# Patient Record
Sex: MALE | Race: WHITE | NOT HISPANIC OR LATINO | Employment: OTHER | URBAN - METROPOLITAN AREA
[De-identification: names, ages, dates, MRNs, and addresses within clinical notes are randomized per-mention and may not be internally consistent; named-entity substitution may affect disease eponyms.]

---

## 2017-01-27 ENCOUNTER — HOSPITAL ENCOUNTER (OUTPATIENT)
Dept: CT IMAGING | Facility: HOSPITAL | Age: 72
Discharge: HOME | End: 2017-01-27
Attending: FAMILY MEDICINE | Admitting: FAMILY MEDICINE

## 2017-01-27 LAB — CREAT BLDA-MCNC: 1.1 MG/DL (ref 0.6–1.3)

## 2017-02-06 ENCOUNTER — HOSPITAL ENCOUNTER (OUTPATIENT)
Dept: CT IMAGING | Facility: HOSPITAL | Age: 72
Discharge: HOME | End: 2017-02-06
Attending: FAMILY MEDICINE | Admitting: FAMILY MEDICINE

## 2017-12-07 ENCOUNTER — HOSPITAL ENCOUNTER (OUTPATIENT)
Dept: ONCOLOGY | Facility: CLINIC | Age: 72
Setting detail: INFUSION SERIES
Discharge: HOME | End: 2017-12-07
Attending: INTERNAL MEDICINE | Admitting: INTERNAL MEDICINE

## 2017-12-07 ENCOUNTER — CLINICAL SUPPORT (OUTPATIENT)
Dept: ONCOLOGY | Facility: HOSPITAL | Age: 72
End: 2017-12-07

## 2017-12-07 NOTE — PROGRESS NOTES
PATIENTS ONCOLOGY RECORD LOCATED IN [facility]      Subjective     Name:  LEATHA RAMÍREZ     Date:  2017  Address:  [address] [place] [address]  Home: [phone]  :  1945 AGE:  72 y.o.        RECORDS OBTAINED:  Patients Oncology Record is located in [facility]

## 2018-02-02 ENCOUNTER — HOSPITAL ENCOUNTER (OUTPATIENT)
Dept: GENERAL RADIOLOGY | Facility: HOSPITAL | Age: 73
Discharge: HOME | End: 2018-02-02
Attending: INTERNAL MEDICINE | Admitting: INTERNAL MEDICINE

## 2018-12-05 ENCOUNTER — HOSPITAL ENCOUNTER (OUTPATIENT)
Dept: ONCOLOGY | Facility: CLINIC | Age: 73
Setting detail: INFUSION SERIES
Discharge: HOME | End: 2018-12-05
Attending: INTERNAL MEDICINE | Admitting: INTERNAL MEDICINE

## 2018-12-05 ENCOUNTER — CLINICAL SUPPORT (OUTPATIENT)
Dept: ONCOLOGY | Facility: HOSPITAL | Age: 73
End: 2018-12-05

## 2018-12-06 ENCOUNTER — HOSPITAL ENCOUNTER (OUTPATIENT)
Dept: ONCOLOGY | Facility: CLINIC | Age: 73
Setting detail: INFUSION SERIES
Discharge: HOME | End: 2018-12-06
Attending: INTERNAL MEDICINE | Admitting: INTERNAL MEDICINE

## 2018-12-06 ENCOUNTER — CLINICAL SUPPORT (OUTPATIENT)
Dept: ONCOLOGY | Facility: HOSPITAL | Age: 73
End: 2018-12-06

## 2018-12-06 NOTE — PROGRESS NOTES
PATIENTS ONCOLOGY RECORD LOCATED IN [facility]      Subjective     Name:  LEATHA RAMÍREZ     Date:  2018  Address:  [address] [place] [address]  Home: [unfilled]  :  1945 AGE:  73 y.o.        RECORDS OBTAINED:  Patients Oncology Record is located in [facility]

## 2019-01-01 ENCOUNTER — TRANSCRIBE ORDERS (OUTPATIENT)
Dept: ADMINISTRATIVE | Facility: HOSPITAL | Age: 74
End: 2019-01-01

## 2019-01-01 ENCOUNTER — OFFICE VISIT (OUTPATIENT)
Dept: CARDIAC SURGERY | Facility: CLINIC | Age: 74
End: 2019-01-01

## 2019-01-01 ENCOUNTER — TELEPHONE (OUTPATIENT)
Dept: CARDIOLOGY | Facility: CLINIC | Age: 74
End: 2019-01-01

## 2019-01-01 ENCOUNTER — OFFICE VISIT (OUTPATIENT)
Dept: ONCOLOGY | Facility: CLINIC | Age: 74
End: 2019-01-01

## 2019-01-01 VITALS
DIASTOLIC BLOOD PRESSURE: 83 MMHG | BODY MASS INDEX: 36.03 KG/M2 | WEIGHT: 266 LBS | HEART RATE: 83 BPM | HEIGHT: 72 IN | SYSTOLIC BLOOD PRESSURE: 126 MMHG

## 2019-01-01 VITALS
OXYGEN SATURATION: 97 % | BODY MASS INDEX: 36.33 KG/M2 | DIASTOLIC BLOOD PRESSURE: 70 MMHG | WEIGHT: 268.2 LBS | HEART RATE: 88 BPM | TEMPERATURE: 98.1 F | HEIGHT: 72 IN | SYSTOLIC BLOOD PRESSURE: 142 MMHG

## 2019-01-01 DIAGNOSIS — I73.9 PERIPHERAL VASCULAR DISEASE, UNSPECIFIED (HCC): Primary | ICD-10-CM

## 2019-01-01 DIAGNOSIS — I25.10 CAD IN NATIVE ARTERY: Primary | ICD-10-CM

## 2019-01-01 DIAGNOSIS — Z85.038 HISTORY OF COLON CANCER: Primary | ICD-10-CM

## 2019-01-01 LAB
ALBUMIN SERPL-MCNC: 4.1 G/DL (ref 3.5–5.2)
ALBUMIN/GLOB SERPL: 1.3 G/DL
ALP SERPL-CCNC: 80 U/L (ref 39–117)
ALT SERPL W P-5'-P-CCNC: 31 U/L (ref 1–41)
ANION GAP SERPL CALCULATED.3IONS-SCNC: 17 MMOL/L (ref 5–15)
AST SERPL-CCNC: 27 U/L (ref 1–40)
BASOPHILS # BLD AUTO: 0 10*3/MM3 (ref 0–0.2)
BASOPHILS NFR BLD AUTO: 0.2 % (ref 0–1.5)
BILIRUB SERPL-MCNC: 1 MG/DL (ref 0.2–1.2)
BUN BLD-MCNC: 21 MG/DL (ref 8–23)
BUN/CREAT SERPL: 13.8 (ref 7–25)
CALCIUM SPEC-SCNC: 10 MG/DL (ref 8.6–10.5)
CEA SERPL-MCNC: 2.2 NG/ML (ref 0–4.7)
CHLORIDE SERPL-SCNC: 96 MMOL/L (ref 98–107)
CO2 SERPL-SCNC: 27 MMOL/L (ref 22–29)
CREAT BLD-MCNC: 1.52 MG/DL (ref 0.76–1.27)
DEPRECATED RDW RBC AUTO: 47.7 FL (ref 37–54)
EOSINOPHIL # BLD AUTO: 0.1 10*3/MM3 (ref 0–0.4)
EOSINOPHIL NFR BLD AUTO: 0.8 % (ref 0.3–6.2)
ERYTHROCYTE [DISTWIDTH] IN BLOOD BY AUTOMATED COUNT: 16.8 % (ref 12.3–15.4)
GFR SERPL CREATININE-BSD FRML MDRD: 45 ML/MIN/1.73
GLOBULIN UR ELPH-MCNC: 3.1 GM/DL
GLUCOSE BLD-MCNC: 102 MG/DL (ref 65–99)
HCT VFR BLD AUTO: 41.3 % (ref 37.5–51)
HGB BLD-MCNC: 14.1 G/DL (ref 13–17.7)
LYMPHOCYTES # BLD AUTO: 1.5 10*3/MM3 (ref 0.7–3.1)
LYMPHOCYTES NFR BLD AUTO: 19.4 % (ref 19.6–45.3)
MCH RBC QN AUTO: 28 PG (ref 26.6–33)
MCHC RBC AUTO-ENTMCNC: 34.2 G/DL (ref 31.5–35.7)
MCV RBC AUTO: 81.8 FL (ref 79–97)
MONOCYTES # BLD AUTO: 0.7 10*3/MM3 (ref 0.1–0.9)
MONOCYTES NFR BLD AUTO: 9.1 % (ref 5–12)
NEUTROPHILS # BLD AUTO: 5.4 10*3/MM3 (ref 1.7–7)
NEUTROPHILS NFR BLD AUTO: 70.5 % (ref 42.7–76)
NRBC BLD AUTO-RTO: 0.6 /100 WBC (ref 0–0.2)
PLATELET # BLD AUTO: 267 10*3/MM3 (ref 140–450)
PMV BLD AUTO: 8.5 FL (ref 6–12)
POTASSIUM BLD-SCNC: 3.3 MMOL/L (ref 3.5–5.2)
PROT SERPL-MCNC: 7.2 G/DL (ref 6–8.5)
RBC # BLD AUTO: 5.04 10*6/MM3 (ref 4.14–5.8)
SODIUM BLD-SCNC: 140 MMOL/L (ref 136–145)
WBC NRBC COR # BLD: 7.7 10*3/MM3 (ref 3.4–10.8)

## 2019-01-01 PROCEDURE — 80053 COMPREHEN METABOLIC PANEL: CPT | Performed by: NURSE PRACTITIONER

## 2019-01-01 PROCEDURE — 85025 COMPLETE CBC W/AUTO DIFF WBC: CPT | Performed by: INTERNAL MEDICINE

## 2019-01-01 PROCEDURE — 99214 OFFICE O/P EST MOD 30 MIN: CPT | Performed by: INTERNAL MEDICINE

## 2019-01-01 PROCEDURE — 99024 POSTOP FOLLOW-UP VISIT: CPT | Performed by: THORACIC SURGERY (CARDIOTHORACIC VASCULAR SURGERY)

## 2019-01-01 RX ORDER — POTASSIUM CHLORIDE 1500 MG/1
TABLET, FILM COATED, EXTENDED RELEASE ORAL
COMMUNITY
Start: 2018-08-08 | End: 2020-01-01

## 2019-01-01 RX ORDER — POTASSIUM CHLORIDE 750 MG/1
10 TABLET, EXTENDED RELEASE ORAL DAILY
COMMUNITY
End: 2020-01-01 | Stop reason: HOSPADM

## 2019-01-01 RX ORDER — ATORVASTATIN CALCIUM 20 MG/1
20 TABLET, FILM COATED ORAL DAILY
Qty: 90 TABLET | Refills: 1 | Status: SHIPPED | OUTPATIENT
Start: 2019-01-01 | End: 2020-01-01

## 2019-01-01 RX ORDER — ACETAMINOPHEN 160 MG
2000 TABLET,DISINTEGRATING ORAL DAILY
COMMUNITY

## 2019-01-01 RX ORDER — COLCHICINE 0.6 MG/1
TABLET ORAL
COMMUNITY
End: 2020-01-01

## 2019-01-01 RX ORDER — ALLOPURINOL 100 MG/1
200 TABLET ORAL DAILY
COMMUNITY
Start: 2019-01-01

## 2019-01-01 RX ORDER — ASPIRIN 81 MG/1
81 TABLET, CHEWABLE ORAL DAILY
COMMUNITY
End: 2020-01-01 | Stop reason: HOSPADM

## 2019-01-01 RX ORDER — ALLOPURINOL 100 MG/1
TABLET ORAL
COMMUNITY
End: 2020-01-01

## 2019-01-01 RX ORDER — MELATONIN
1000 DAILY
COMMUNITY
End: 2020-01-01

## 2019-01-01 RX ORDER — PREDNISONE 20 MG/1
TABLET ORAL
COMMUNITY
End: 2020-01-01

## 2019-01-01 RX ORDER — APIXABAN 5 MG/1
TABLET, FILM COATED ORAL
Qty: 60 TABLET | Refills: 2 | Status: SHIPPED | OUTPATIENT
Start: 2019-01-01 | End: 2019-01-01 | Stop reason: SDUPTHER

## 2019-04-10 ENCOUNTER — HOSPITAL ENCOUNTER (OUTPATIENT)
Dept: CARDIOLOGY | Facility: HOSPITAL | Age: 74
Discharge: HOME | End: 2019-04-10
Attending: INTERNAL MEDICINE | Admitting: INTERNAL MEDICINE

## 2019-04-16 ENCOUNTER — TELEPHONE (OUTPATIENT)
Dept: CARDIAC SURGERY | Facility: CLINIC | Age: 74
End: 2019-04-16

## 2019-04-16 NOTE — TELEPHONE ENCOUNTER
Spoke to patient to let him know that surgery was scheduled for [id number] with Dr. Bermudez at [facility]. He will be contacted by the PAT department to set up his times for PAT and surgery arrival times. His ELIQUIS has been stopped and he has been given instructions on his LOVENOX injections. He expressed a verbal understanding of all.  He was instructed to call the office with any further questions.

## 2019-04-23 ENCOUNTER — OUTSIDE FACILITY SERVICE (OUTPATIENT)
Dept: CARDIAC SURGERY | Facility: CLINIC | Age: 74
End: 2019-04-23

## 2019-04-23 PROCEDURE — 33534 CABG ARTERIAL TWO: CPT | Performed by: THORACIC SURGERY (CARDIOTHORACIC VASCULAR SURGERY)

## 2019-04-23 PROCEDURE — 33518 CABG ARTERY-VEIN TWO: CPT | Performed by: PHYSICIAN ASSISTANT

## 2019-04-23 PROCEDURE — 33508 ENDOSCOPIC VEIN HARVEST: CPT | Performed by: THORACIC SURGERY (CARDIOTHORACIC VASCULAR SURGERY)

## 2019-04-23 PROCEDURE — 33259 ABLATE ATRIA W/BYPASS ADD-ON: CPT | Performed by: THORACIC SURGERY (CARDIOTHORACIC VASCULAR SURGERY)

## 2019-04-23 PROCEDURE — 33259 ABLATE ATRIA W/BYPASS ADD-ON: CPT | Performed by: PHYSICIAN ASSISTANT

## 2019-04-23 PROCEDURE — 33518 CABG ARTERY-VEIN TWO: CPT | Performed by: THORACIC SURGERY (CARDIOTHORACIC VASCULAR SURGERY)

## 2019-04-23 PROCEDURE — 33508 ENDOSCOPIC VEIN HARVEST: CPT | Performed by: PHYSICIAN ASSISTANT

## 2019-04-23 PROCEDURE — 33534 CABG ARTERIAL TWO: CPT | Performed by: PHYSICIAN ASSISTANT

## 2019-05-01 ENCOUNTER — TELEPHONE (OUTPATIENT)
Dept: CARDIAC SURGERY | Facility: CLINIC | Age: 74
End: 2019-05-01

## 2019-05-01 NOTE — TELEPHONE ENCOUNTER
I received a message from the pharmacy concerning patients Uloric prescription. I spoke with Sarita MONTES DE OCA who let me know it was Dr Jerome's office that prescribed this. I let the pharmacy know they will fax the PA to his office

## 2019-05-29 NOTE — PROGRESS NOTES
5/29/2019       Subjective:     Primary Care Physician: Quyen Headley    Chief Complaint: Routine post-op visit    History of Present Illness:       Dear Colleagues,    I had the pleasure of seeing Portillo Pete in the office following his CABG x 4 and LA Maze on 2019/1/24.    He reports that he is feeling well.    He denies chest pain or shortness of breath.      Past Medical History:   Diagnosis Date   • 3-vessel CAD 04/15/2019    Severe Involving Proximal LAD/LCX--Noted on Cardiac Cath    • Allergic rhinitis 04/2018   • Angina pectoris (CMS/HCC) Chronic   • Bilateral renal cysts 04/15/2019    Noted on Renal US   • Bronchitis 01/2017   • CAD (coronary artery disease) 11/17/2016    Involving the LAD/LCX--Noted on Cardiac Cath   • Cardiomegaly 11/16/16 & 02/06/2017 & 4/19/19-XR    Noted on Chest XR & CT Chest   • Chronic fatigue    • Colon cancer (CMS/HCC) Dx in 2000    Stage 2 w/Chemo--S/p Sx on 06/30/11    • Coronary artery calcification    • Cyst of left kidney 04/15/2019    1.7CM--Noted on Renal US   • DVT (deep venous thrombosis) (CMS/HCC) Hx   • Dyspnea 04/2019   • Elevated serum creatinine 4/15/19-F IP   • Enlarged prostate 04/15/2019    Noted on Renal US   • First degree AV block 08/09/2012    Noted on EKG   • GERD (gastroesophageal reflux disease)     Prilosec   • History of cancer chemotherapy 2011    Colon Ca   • History of chest x-ray 4/19/19-F    L Midlung Granulomas; Stable Cardiomegaly   • History of EKG 08/9/12-[facility]    First Degree AV Block; Premature Atrial Contractions   • History of EKG 4/2019-F   • History of EKG 6/25/11-[facility]    L Axis Deviations; Probable Lateral Infarct; Borderline AV Conduction Delay   • History of nuclear stress test 11/16/16-[facility]    Inferoapocal Wall Ischemia Noted   • Hx of chest x-ray 11/16/16-F    Normal w/Stable Cardiomegaly   • Hx of colonic polyps 06/24/2011    Noted on Colonoscopy   • Hyperbilirubinemia Hx   • Hypertension     Controlled w/Meds   • LAD  stenosis 11/17/2016    40-50% LAD Disease-First Diagonal is Small w/60-70% Disease--Noted on Cardiac Cath    • LAD stenosis 04/15/2019    70% Proximal LAD Stenosis; 90% Diagonal; 80-90% First Marginal Stenosis; 2nd Marginal is Large with 60-70% Disease--Noted on Cardiac Cath    • Left axis deviation 06/25/2011    Noted on EKG   • Lower extremity edema 2017   • 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    Noted on Chest XR   • Megaloblastic anemia due to B12 deficiency    • Mixed hyperlipidemia    • Obesity    • Osteoarthritis     Knee   • Paroxysmal A-fib (CMS/[facility]) Dx 11/2016    Eliquis   • PE (pulmonary thromboembolism) (CMS/[facility]) Hx   • Pulmonary nodule, right 02/06/2017    8MM--Noted on CT Chest   • Pulmonary nodules 01/27/17 & 02/06/2017    Several--Noted on CT Chest Measuring 2-3MM   • RCA occlusion (CMS/[facility]) 11/17/2016    50% Proximal Disease--Noted on Cardiac Cath    • RCA occlusion (CMS/[facility]) 04/15/2019    70% Ostial Stenosis; Ostial the PDA to Proximal PDA Has 90% Disease--Noted on Cardiac Cath   • Renal cyst 04/15/2019    Noted on Renal US--R Measuring 6.8CM   • SOB (shortness of breath) 2016   • Vitamin B 12 deficiency    • Vitamin D deficiency        Past Surgical History:   Procedure Laterality Date   • CARDIAC CATHETERIZATION Left 11/17/16-[facility]    w/Arteriography/Angiography--Dr. Og--Nonobstructive CAD Involving the LAD & LCX   • CARDIAC CATHETERIZATION Left 4/15/19-F    w/Arteriography/Angiography--Dr. Og--Severe 3V CAD   • COLON RESECTION  2000    Colon Ca   • COLON SURGERY  06/25/2011    Lap L Hemicolectomy w/Transverse Sigmoid Side to Side Anastomaosis   • COLONOSCOPY W/ POLYPECTOMY  06/24/2011 & 05/2012   • CORONARY ARTERY BYPASS GRAFT  04/24/2019    X4  Dr Bermudez   • MAZE PROCEDURE  04/23/2019    Dr Bermudez   • OTHER SURGICAL HISTORY  6/30/11-[facility]    L Subclavian Mediport Placement w/Fluoroscopy Interpretation--Akila Cartagena MD--For Stage 2 Colon Cancer   • OTHER SURGICAL HISTORY   8/9/12-[facility]    Removal of L Subclavian Mediport--Akila Cartagena MD--For Stage 1 Colon Cancer/Chemo       No Known Allergies      Current Outpatient Medications:   •  aspirin 81 MG chewable tablet, Chew 81 mg Daily., Disp: , Rfl:   •  atorvastatin (LIPITOR) 20 MG tablet, , Disp: , Rfl:   •  B Complex Vitamins (VITAMIN B COMPLEX PO), Take  by mouth., Disp: , Rfl:   •  cholecalciferol (VITAMIN D3) 1000 units tablet, Take 1,000 Units by mouth Daily., Disp: , Rfl:   •  ELIQUIS 5 MG tablet tablet, Every 12 (Twelve) Hours., Disp: , Rfl:   •  furosemide (LASIX) 40 MG tablet, Lasix 40 mg tablet  Take 1 tablet every day by oral route for 90 days., Disp: , Rfl:   •  metoprolol tartrate (LOPRESSOR) 25 MG tablet, 12.5 mg 2 (Two) Times a Day., Disp: , Rfl:   •  omeprazole (priLOSEC) 40 MG capsule, omeprazole 40 mg capsule,delayed release  TAKE ONE CAPSULE BY MOUTH DAILY AS NEEDED, Disp: , Rfl:   •  potassium chloride (MICRO-K) 10 MEQ CR capsule, potassium chloride ER 10 mEq capsule,extended release  Take 2 capsules every day by oral route for 90 days., Disp: , Rfl:   •  sildenafil (VIAGRA) 100 MG tablet, sildenafil 100 mg tablet  TAKE ONE TABLET BY MOUTH DAILY AS NEEDED, Disp: , Rfl:   •  silver sulfadiazine (SILVADENE, SSD) 1 % cream, silver sulfadiazine 1 % topical cream  APPLY A 1/16 INCH (1.5 MM) THICK LAYER TO ENTIRE BURN AREA BY TOPICALROUTE 2 TIMES PER DAY, Disp: , Rfl:   •  amLODIPine (NORVASC) 10 MG tablet, amlodipine 10 mg tablet  1 po qd, Disp: , Rfl:   •  amLODIPine (NORVASC) 5 MG tablet, amlodipine 5 mg tablet  TAKE ONE TABLET BY MOUTH DAILY, Disp: , Rfl:   •  benzonatate (TESSALON PERLES) 100 MG capsule, Every 8 (Eight) Hours., Disp: , Rfl:   •  enoxaparin (LOVENOX) 120 MG/0.8ML solution syringe, , Disp: , Rfl:   •  fluticasone (FLONASE) 50 MCG/ACT nasal spray, fluticasone propionate 50 mcg/actuation nasal spray,suspension  Spray 1 spray every day by intranasal route., Disp: , Rfl:   •  furosemide (LASIX) 20 MG tablet,  furosemide 20 mg tablet  TAKE ONE TABLET BY MOUTH DAILY, Disp: , Rfl:   •  meloxicam (MOBIC) 15 MG tablet, meloxicam 15 mg tablet  Take 1 tablet every day by oral route., Disp: , Rfl:   •  sildenafil (REVATIO) 20 MG tablet, sildenafil (antihypertensive) 20 mg tablet  TAKE TWO AND ONE-HALF (2  1/2) TO FIVE TABLETS BY MOUTH DAILY AS NEEDED, Disp: , Rfl:   •  triamterene-hydrochlorothiazide (MAXZIDE-25) 37.5-25 MG per tablet, triamterene 37.5 mg-hydrochlorothiazide 25 mg tablet  Take 1 tablet every day by oral route., Disp: , Rfl:     Social History     Socioeconomic History   • Marital status:      Spouse name: Sujatha   • Number of children: Not on file   • Years of education: Not on file   • Highest education level: Not on file   Occupational History   • Occupation: RETIRED   Tobacco Use   • Smoking status: Former Smoker     Types: Cigarettes   • Smokeless tobacco: Never Used   • Tobacco comment: Smoked x 15 Yrs--Quit in 1982   Substance and Sexual Activity   • Alcohol use: No     Comment: Daily Caffeine    • Drug use: No   • Sexual activity: Defer       Family History   Problem Relation Age of Onset   • Heart failure Mother          Review of Systems:    Review of Systems   Constitutional: Negative for chills, diaphoresis, fatigue and fever.   Respiratory: Negative for chest tightness, shortness of breath and wheezing.    Cardiovascular: Negative for chest pain and leg swelling.   Neurological: Negative for dizziness, syncope and light-headedness.       Physical Exam:    Vital Signs:  Weight: 122 kg (268 lb 3.2 oz)   Body mass index is 36.37 kg/m².  Temp: 98.1 °F (36.7 °C)   Heart Rate: 88   BP: 142/70     Physical Exam   Constitutional: He is oriented to person, place, and time. He appears well-developed and well-nourished. No distress.   Cardiovascular: Normal rate, regular rhythm and normal heart sounds.   No murmur heard.  Pulmonary/Chest: Breath sounds normal. No respiratory distress. He has no wheezes.    Neurological: He is alert and oriented to person, place, and time.   Skin: Skin is warm and dry. He is not diaphoretic.     The sternum is stable and the incisions are well-healed.     Assessment:     73 y.o. male , s/p CABG and LA Maze.    He is in sinus rhythm today.    He remains on Eliquis.    He is recovering very well.      Recommendation/Plan:     Sternal precautions lifted.    No further surgical intervention required at this time.    Thank you for allowing me to participate in his care.      Best regards,    Ray Bermudez MD, PhD

## 2019-12-05 NOTE — PROGRESS NOTES
Hematology/Oncology Outpatient Follow Up    Portillo Pete  1945    Primary Care Physician: Quyen Headley  Referring Physician: No ref. provider found  Chief Complaint:  Stage III adenocarcinoma of the colon in June 2011  History of Present Illness:      Mr. Pete was diagnosed with colon cancer in June 2011.   1. Oncologic history. Mr. Pete had a screening colonoscopy, which showed a polyp in the splenic   flexure, 2.5cm at the base. Underwent a piecemeal polypectomy. Post colonoscopy was complicated   with perforation. The patient underwent segmental resection of colon in the splenic flexure,   including tumor. Pathology report was mucinous adenocarcinoma, and metastatic to 1 out of 24   lymph nodes. The patient recovered well post operatively, and was discharged home.   · 6/28/11 - CT scan of the chest, abdomen, pelvis negative for metastatic disease. Positive for   osteoarthritis.   · 7/28/11 - Treatment with 5FU, Oxaliplatin and Leucovorin was initiated as adjuvant treatment for   his stage 3 colon cancer.   · 12/12/11 - PET CT scan negative for evidence of metastatic disease.   · 12/28/11 - Patient completed 12 cycles of chemotherapy with FOLFOX.   · 4/13/12 - PET CT scan negative for evidence of metastatic disease.  2. Multiple colon polyps on colonoscopy done in May 2012. There were benign tubular villous   adenomas.   · Colonoscopy in May 2015 was normal.  No polyps detected.    3. Port removed in August 2012.     Past Medical History:   Diagnosis Date   • 3-vessel CAD 04/15/2019    Severe Involving Proximal LAD/LCX--Noted on Cardiac Cath    • Allergic rhinitis 04/2018   • Angina pectoris (CMS/HCC) Chronic   • Bilateral renal cysts 04/15/2019    Noted on Renal US   • Bronchitis 01/2017   • CAD (coronary artery disease) 11/17/2016    Involving the LAD/LCX--Noted on Cardiac Cath   • Cardiomegaly 11/16/16 & 02/06/2017 & 4/19/19-XR    Noted on Chest XR & CT Chest   • Chronic fatigue    • Colon  cancer (CMS/HCC) Dx in 2000    Stage 2 w/Chemo--S/p Sx on 06/30/11    • Coronary artery calcification    • Cyst of left kidney 04/15/2019    1.7CM--Noted on Renal US   • DVT (deep venous thrombosis) (CMS/HCC) Hx   • Dyspnea 04/2019   • Elevated serum creatinine 4/15/19-[facility] IP   • Enlarged prostate 04/15/2019    Noted on Renal US   • First degree AV block 08/09/2012    Noted on EKG   • GERD (gastroesophageal reflux disease)     Prilosec   • History of cancer chemotherapy 2011    Colon Ca   • History of chest x-ray 4/19/19-[facility]    L Midlung Granulomas; Stable Cardiomegaly   • History of EKG 08/9/12-[facility]    First Degree AV Block; Premature Atrial Contractions   • History of EKG 4/2019-[facility]   • History of EKG 6/25/11-[facility]    L Axis Deviations; Probable Lateral Infarct; Borderline AV Conduction Delay   • History of nuclear stress test 11/16/16-[facility]    Inferoapocal Wall Ischemia Noted   • Hx of chest x-ray 11/16/16-[facility]    Normal w/Stable Cardiomegaly   • Hx of colonic polyps 06/24/2011    Noted on Colonoscopy   • Hyperbilirubinemia Hx   • Hypertension     Controlled w/Meds   • LAD stenosis 11/17/2016    40-50% LAD Disease-First Diagonal is Small w/60-70% Disease--Noted on Cardiac Cath    • LAD stenosis 04/15/2019    70% Proximal LAD Stenosis; 90% Diagonal; 80-90% First Marginal Stenosis; 2nd Marginal is Large with 60-70% Disease--Noted on Cardiac Cath    • Left axis deviation 06/25/2011    Noted on EKG   • Lower extremity edema 2017   • 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) Hx   • Pulmonary nodule, right 02/06/2017    8MM--Noted on CT Chest   • Pulmonary nodules 01/27/17 & 02/06/2017    Several--Noted on CT Chest Measuring 2-3MM   • RCA occlusion (CMS/HCC) 11/17/2016    50% Proximal Disease--Noted  on Cardiac Cath    • RCA occlusion (CMS/HCC) 04/15/2019    70% Ostial Stenosis; Ostial the PDA to Proximal PDA Has 90% Disease--Noted on Cardiac Cath   • Renal cyst 04/15/2019    Noted on Renal US--R Measuring 6.8CM   • SOB (shortness of breath) 2016   • Vitamin B 12 deficiency    • Vitamin D deficiency        Past Surgical History:   Procedure Laterality Date   • CARDIAC CATHETERIZATION Left 11/17/16-[facility]    w/Arteriography/Angiography--Dr. Og--Nonobstructive CAD Involving the LAD & LCX   • CARDIAC CATHETERIZATION Left 4/15/19-[facility]    w/Arteriography/Angiography--Dr. Og--Severe 3V CAD   • COLON RESECTION  2000    Colon Ca   • COLON SURGERY  06/25/2011    Lap L Hemicolectomy w/Transverse Sigmoid Side to Side Anastomaosis   • COLONOSCOPY W/ POLYPECTOMY  06/24/2011 & 05/2012   • CORONARY ARTERY BYPASS GRAFT  04/24/2019    X4  Dr Bermudez   • MAZE PROCEDURE  04/23/2019    Dr Bermudez   • OTHER SURGICAL HISTORY  6/30/11-[facility]    L Subclavian Mediport Placement w/Fluoroscopy Interpretation--Akila Cartagena MD--For Stage 2 Colon Cancer   • OTHER SURGICAL HISTORY  8/9/12-[facility]    Removal of L Subclavian Mediport--Akila Cartagena MD--For Stage 1 Colon Cancer/Chemo         Current Outpatient Medications:   •  allopurinol (ZYLOPRIM) 100 MG tablet, allopurinol 100 mg tablet  TAKE TWO TABLETS BY MOUTH DAILY, Disp: , Rfl:   •  allopurinol (ZYLOPRIM) 100 MG tablet, , Disp: , Rfl:   •  amLODIPine (NORVASC) 10 MG tablet, amlodipine 10 mg tablet  1 po qd, Disp: , Rfl:   •  amLODIPine (NORVASC) 5 MG tablet, amlodipine 5 mg tablet  TAKE ONE TABLET BY MOUTH DAILY, Disp: , Rfl:   •  aspirin 81 MG chewable tablet, Chew 81 mg Daily., Disp: , Rfl:   •  atorvastatin (LIPITOR) 20 MG tablet, Take 1 tablet by mouth Daily., Disp: 90 tablet, Rfl: 1  •  B Complex Vitamins (VITAMIN B COMPLEX PO), Take  by mouth., Disp: , Rfl:   •  benzonatate (TESSALON PERLES) 100 MG capsule, Every 8 (Eight) Hours., Disp: , Rfl:   •  cholecalciferol (VITAMIN D3)  1000 units tablet, Take 1,000 Units by mouth Daily., Disp: , Rfl:   •  Cholecalciferol (VITAMIN D3) 50 MCG (2000 UT) capsule, Vitamin D3 50 mcg (2,000 unit) capsule  Take 1 capsule every day by oral route for 90 days., Disp: , Rfl:   •  colchicine 0.6 MG tablet, colchicine 0.6 mg tablet, Disp: , Rfl:   •  ELIQUIS 5 MG tablet tablet, TAKE ONE TABLET BY MOUTH TWICE A DAY, Disp: 60 tablet, Rfl: 2  •  enoxaparin (LOVENOX) 120 MG/0.8ML solution syringe, , Disp: , Rfl:   •  fluticasone (FLONASE) 50 MCG/ACT nasal spray, fluticasone propionate 50 mcg/actuation nasal spray,suspension  Spray 1 spray every day by intranasal route., Disp: , Rfl:   •  furosemide (LASIX) 20 MG tablet, furosemide 20 mg tablet  TAKE ONE TABLET BY MOUTH DAILY, Disp: , Rfl:   •  furosemide (LASIX) 40 MG tablet, Lasix 40 mg tablet  Take 1 tablet every day by oral route for 90 days., Disp: , Rfl:   •  meloxicam (MOBIC) 15 MG tablet, meloxicam 15 mg tablet  Take 1 tablet every day by oral route., Disp: , Rfl:   •  metoprolol tartrate (LOPRESSOR) 25 MG tablet, 12.5 mg 2 (Two) Times a Day., Disp: , Rfl:   •  omeprazole (priLOSEC) 40 MG capsule, omeprazole 40 mg capsule,delayed release  TAKE ONE CAPSULE BY MOUTH DAILY AS NEEDED, Disp: , Rfl:   •  potassium chloride (K-DUR,KLOR-CON) 10 MEQ CR tablet, potassium chloride ER 10 mEq tablet,extended release(part/cryst), Disp: , Rfl:   •  potassium chloride (MICRO-K) 10 MEQ CR capsule, potassium chloride ER 10 mEq capsule,extended release  Take 2 capsules every day by oral route for 90 days., Disp: , Rfl:   •  potassium chloride ER (K-TAB) 20 MEQ tablet controlled-release ER tablet, potassium chloride ER 20 mEq tablet,extended release  TAKE ONE TABLET BY MOUTH DAILY, Disp: , Rfl:   •  predniSONE (DELTASONE) 20 MG tablet, prednisone 20 mg tablet, Disp: , Rfl:   •  sildenafil (REVATIO) 20 MG tablet, sildenafil (antihypertensive) 20 mg tablet  TAKE TWO AND ONE-HALF (2  1/2) TO FIVE TABLETS BY MOUTH DAILY AS NEEDED,  Disp: , Rfl:   •  sildenafil (VIAGRA) 100 MG tablet, sildenafil 100 mg tablet  TAKE ONE TABLET BY MOUTH DAILY AS NEEDED, Disp: , Rfl:   •  silver sulfadiazine (SILVADENE, SSD) 1 % cream, silver sulfadiazine 1 % topical cream  APPLY A 1/16 INCH (1.5 MM) THICK LAYER TO ENTIRE BURN AREA BY TOPICALROUTE 2 TIMES PER DAY, Disp: , Rfl:   •  triamterene-hydrochlorothiazide (MAXZIDE-25) 37.5-25 MG per tablet, triamterene 37.5 mg-hydrochlorothiazide 25 mg tablet  Take 1 tablet every day by oral route., Disp: , Rfl:     No Known Allergies    Family History   Problem Relation Age of Onset   • Heart failure Mother        Cancer-related family history is not on file.    Social History     Tobacco Use   • Smoking status: Former Smoker     Types: Cigarettes   • Smokeless tobacco: Never Used   • Tobacco comment: Smoked x 15 Yrs--Quit in 1982   Substance Use Topics   • Alcohol use: No     Comment: Daily Caffeine    • Drug use: No       I have reviewed the history of present illness, past medical history, family history, social history, lab results, all notes and other records since the patient was last seen on   Visit date not found  SUBJECTIVE:      Patient in office for yearly visit.  He underwent open heart surgery earlier this year.  He did not lose weight.  Denies any visible blood loss.    ROS:      Review of Systems   Constitutional: Negative for fever.   HENT: Negative for nosebleeds and trouble swallowing.    Eyes: Negative for visual disturbance.   Respiratory: Negative for cough, shortness of breath and wheezing.    Cardiovascular: Negative for chest pain.   Gastrointestinal: Negative for abdominal pain and blood in stool.   Endocrine: Negative for cold intolerance.   Genitourinary: Negative for dysuria and hematuria.   Musculoskeletal: Negative for joint swelling.   Skin: Negative for rash.   Allergic/Immunologic: Negative for environmental allergies.   Neurological: Negative for seizures.   Hematological: Does not  "bruise/bleed easily.   Psychiatric/Behavioral: The patient is not nervous/anxious.          Objective:       Vitals:    12/05/19 0854   BP: 126/83   Pulse: 83   Weight: 121 kg (266 lb)   Height: 182.9 cm (72\")         PHYSICAL EXAM:      Physical Exam   Constitutional: He is oriented to person, place, and time. No distress.   HENT:   Head: Normocephalic and atraumatic.   Eyes: Conjunctivae and EOM are normal. Right eye exhibits no discharge. Left eye exhibits no discharge. No scleral icterus.   Neck: Normal range of motion. Neck supple. No thyromegaly present.   Cardiovascular: Normal rate, regular rhythm and normal heart sounds. Exam reveals no gallop and no friction rub.   Pulmonary/Chest: Effort normal. No stridor. No respiratory distress. He has no wheezes.   Abdominal: Soft. Bowel sounds are normal. He exhibits no mass. There is no tenderness. There is no rebound and no guarding.   Musculoskeletal: Normal range of motion. He exhibits no tenderness.   Lymphadenopathy:     He has no cervical adenopathy.   Neurological: He is alert and oriented to person, place, and time. He exhibits normal muscle tone.   Skin: Skin is warm. No rash noted. He is not diaphoretic. No erythema.   Psychiatric: He has a normal mood and affect. His behavior is normal.   Nursing note and vitals reviewed.       RECENT LABS:     WBC   Date Value Ref Range Status   04/28/2019 8.2 4.5 - 11.5 10*3/uL Final     RBC   Date Value Ref Range Status   04/28/2019 3.46 (L) 4.60 - 6.00 10*6/uL Final     Hemoglobin   Date Value Ref Range Status   04/28/2019 10.2 (L) 14.0 - 18.0 g/dL Final     Hematocrit   Date Value Ref Range Status   04/28/2019 30.2 (L) 40 - 54 % Final     MCV   Date Value Ref Range Status   04/28/2019 87.2 80 - 94 fL Final     MCH   Date Value Ref Range Status   04/28/2019 29.4 26 - 32 pg Final     MCHC   Date Value Ref Range Status   04/28/2019 33.8 32 - 36 g/dL Final     RDW   Date Value Ref Range Status   04/28/2019 15.6 (H) 11.5 " - 14.5 % Final     MPV   Date Value Ref Range Status   04/28/2019 8.7 7.4 - 10.4 fL Final     Platelets   Date Value Ref Range Status   04/28/2019 231 150 - 450 10*3/uL Final     Neutrophil Rel %   Date Value Ref Range Status   04/28/2019 71 50 - 75 % Final     Lymphocyte Rel %   Date Value Ref Range Status   04/28/2019 16 (L) 18 - 42 % Final     Monocyte Rel %   Date Value Ref Range Status   04/28/2019 11 2 - 11 % Final     Eosinophil Rel %   Date Value Ref Range Status   04/28/2019 2 0 - 3 % Final     Basophil Rel %   Date Value Ref Range Status   04/28/2019 0 0 - 2 % Final     Neutrophils Absolute   Date Value Ref Range Status   04/28/2019 5.8 2.3 - 8.6 10*3/uL Final     Lymphocytes Absolute   Date Value Ref Range Status   04/28/2019 1.4 0.8 - 4.8 10*3/uL Final     Monocytes Absolute   Date Value Ref Range Status   04/28/2019 0.9 0.1 - 1.3 10*3/uL Final     Eosinophils Absolute   Date Value Ref Range Status   04/28/2019 0.1 0.0 - 0.3 10*3/uL Final     Basophils Absolute   Date Value Ref Range Status   04/28/2019 0.0 0 - 0.2 10*3/uL Final     nRBC   Date Value Ref Range Status   04/28/2019 0 0 /100[WBCs] Final       Lab Results   Component Value Date    GLUCOSE 110 (H) 04/28/2019    BUN 37 (H) 04/28/2019    CREATININE 1.4 (H) 04/28/2019    EGFRIFAFRI >60 01/27/2017    BCR 26.4 (H) 04/28/2019    K 3.9 04/28/2019    CO2 23 04/28/2019    CALCIUM 8.7 (L) 04/28/2019    ALBUMIN 3.2 (L) 04/27/2019    LABIL2 1.1 04/27/2019    AST 30 04/27/2019    ALT 18 04/27/2019         Assessment/Plan      ASSESSMENT:     1. History of stage III colon cancer  2. Atrial fibrillation  3. Status post CABG  4. History of colon polyps  5. Alcohol abuse  6. ECOG 0    PLAN:      1. Patient is my office for follow-up.  CBC is within normal range.  2. Check CMP CEA.  3. Encouraged him to lose weight.  4. Continue anticoagulation  5. He is due for colonoscopy next year  6. I will see him back in my office in 1 year recheck CBC CMP CEA at that  time    I have reviewed labs results, imaging, vitals, and medications with the patient today.      Patient verbalized understanding and is in agreement of the above plan.          This report was compiled using Dragon voice recognition software. I have made every effort to proof read this document; however, typographical errors may persist.

## 2020-01-01 ENCOUNTER — OFFICE VISIT (OUTPATIENT)
Dept: NEUROSURGERY | Facility: CLINIC | Age: 75
End: 2020-01-01

## 2020-01-01 ENCOUNTER — APPOINTMENT (OUTPATIENT)
Dept: CT IMAGING | Facility: HOSPITAL | Age: 75
End: 2020-01-01

## 2020-01-01 ENCOUNTER — HOSPITAL ENCOUNTER (INPATIENT)
Facility: HOSPITAL | Age: 75
LOS: 5 days | Discharge: HOME HEALTH | End: 2020-01-15
Attending: NEUROLOGICAL SURGERY | Admitting: NEUROLOGICAL SURGERY

## 2020-01-01 ENCOUNTER — APPOINTMENT (OUTPATIENT)
Dept: ULTRASOUND IMAGING | Facility: HOSPITAL | Age: 75
End: 2020-01-01

## 2020-01-01 ENCOUNTER — TELEPHONE (OUTPATIENT)
Dept: NEUROSURGERY | Facility: CLINIC | Age: 75
End: 2020-01-01

## 2020-01-01 ENCOUNTER — APPOINTMENT (OUTPATIENT)
Dept: CARDIOLOGY | Facility: HOSPITAL | Age: 75
End: 2020-01-01

## 2020-01-01 ENCOUNTER — OFFICE VISIT (OUTPATIENT)
Dept: ONCOLOGY | Facility: CLINIC | Age: 75
End: 2020-01-01

## 2020-01-01 ENCOUNTER — APPOINTMENT (OUTPATIENT)
Dept: PREADMISSION TESTING | Facility: HOSPITAL | Age: 75
End: 2020-01-01

## 2020-01-01 ENCOUNTER — APPOINTMENT (OUTPATIENT)
Dept: NEUROLOGY | Facility: HOSPITAL | Age: 75
End: 2020-01-01

## 2020-01-01 ENCOUNTER — APPOINTMENT (OUTPATIENT)
Dept: GENERAL RADIOLOGY | Facility: HOSPITAL | Age: 75
End: 2020-01-01

## 2020-01-01 ENCOUNTER — HOSPITAL ENCOUNTER (INPATIENT)
Facility: HOSPITAL | Age: 75
LOS: 7 days | Discharge: NURSING, REHAB OR LONG-TERM CARE | End: 2020-02-26
Attending: EMERGENCY MEDICINE | Admitting: INTERNAL MEDICINE

## 2020-01-01 ENCOUNTER — READMISSION MANAGEMENT (OUTPATIENT)
Dept: CALL CENTER | Facility: HOSPITAL | Age: 75
End: 2020-01-01

## 2020-01-01 ENCOUNTER — HOSPITAL ENCOUNTER (OUTPATIENT)
Dept: ONCOLOGY | Facility: HOSPITAL | Age: 75
Setting detail: INFUSION SERIES
Discharge: HOME | End: 2020-03-06

## 2020-01-01 ENCOUNTER — HOSPITAL ENCOUNTER (INPATIENT)
Facility: HOSPITAL | Age: 75
LOS: 7 days | End: 2020-05-06
Attending: EMERGENCY MEDICINE | Admitting: INTERNAL MEDICINE

## 2020-01-01 ENCOUNTER — APPOINTMENT (OUTPATIENT)
Dept: MRI IMAGING | Facility: HOSPITAL | Age: 75
End: 2020-01-01

## 2020-01-01 ENCOUNTER — TELEPHONE (OUTPATIENT)
Dept: ONCOLOGY | Facility: CLINIC | Age: 75
End: 2020-01-01

## 2020-01-01 ENCOUNTER — HOSPITAL ENCOUNTER (OUTPATIENT)
Dept: ONCOLOGY | Facility: HOSPITAL | Age: 75
Setting detail: INFUSION SERIES
Discharge: HOME | End: 2020-04-28

## 2020-01-01 ENCOUNTER — HOSPITAL ENCOUNTER (OUTPATIENT)
Dept: RADIATION ONCOLOGY | Facility: HOSPITAL | Age: 75
Setting detail: RADIATION/ONCOLOGY SERIES
End: 2020-01-01

## 2020-01-01 ENCOUNTER — ANESTHESIA (OUTPATIENT)
Dept: PERIOP | Facility: HOSPITAL | Age: 75
End: 2020-01-01

## 2020-01-01 ENCOUNTER — OFFICE VISIT (OUTPATIENT)
Dept: LAB | Facility: HOSPITAL | Age: 75
End: 2020-01-01

## 2020-01-01 ENCOUNTER — CLINICAL SUPPORT NO REQUIREMENTS (OUTPATIENT)
Dept: CARDIOLOGY | Facility: CLINIC | Age: 75
End: 2020-01-01

## 2020-01-01 ENCOUNTER — APPOINTMENT (OUTPATIENT)
Dept: LAB | Facility: HOSPITAL | Age: 75
End: 2020-01-01

## 2020-01-01 ENCOUNTER — HOSPITAL ENCOUNTER (OUTPATIENT)
Dept: ONCOLOGY | Facility: HOSPITAL | Age: 75
Setting detail: INFUSION SERIES
Discharge: HOME | End: 2020-04-03

## 2020-01-01 ENCOUNTER — ANESTHESIA EVENT (OUTPATIENT)
Dept: PERIOP | Facility: HOSPITAL | Age: 75
End: 2020-01-01

## 2020-01-01 ENCOUNTER — APPOINTMENT (OUTPATIENT)
Dept: INTERVENTIONAL RADIOLOGY/VASCULAR | Facility: HOSPITAL | Age: 75
End: 2020-01-01

## 2020-01-01 ENCOUNTER — HOSPITAL ENCOUNTER (INPATIENT)
Facility: HOSPITAL | Age: 75
LOS: 2 days | Discharge: HOME | End: 2020-01-06
Attending: EMERGENCY MEDICINE | Admitting: INTERNAL MEDICINE

## 2020-01-01 ENCOUNTER — TELEPHONE (OUTPATIENT)
Dept: CARDIOLOGY | Facility: CLINIC | Age: 75
End: 2020-01-01

## 2020-01-01 ENCOUNTER — HOSPITAL ENCOUNTER (OUTPATIENT)
Dept: MRI IMAGING | Facility: HOSPITAL | Age: 75
Discharge: HOME | End: 2020-02-18

## 2020-01-01 ENCOUNTER — OFFICE VISIT (OUTPATIENT)
Dept: RADIATION ONCOLOGY | Facility: HOSPITAL | Age: 75
End: 2020-01-01

## 2020-01-01 ENCOUNTER — HOSPITAL ENCOUNTER (OUTPATIENT)
Dept: RADIATION ONCOLOGY | Facility: HOSPITAL | Age: 75
Discharge: HOME | End: 2020-02-13

## 2020-01-01 ENCOUNTER — PREP FOR SURGERY (OUTPATIENT)
Dept: OTHER | Facility: HOSPITAL | Age: 75
End: 2020-01-01

## 2020-01-01 ENCOUNTER — OFFICE VISIT (OUTPATIENT)
Dept: CARDIOLOGY | Facility: CLINIC | Age: 75
End: 2020-01-01

## 2020-01-01 ENCOUNTER — TELEPHONE (OUTPATIENT)
Dept: ONCOLOGY | Facility: HOSPITAL | Age: 75
End: 2020-01-01

## 2020-01-01 ENCOUNTER — INPATIENT HOSPITAL (AMBULATORY)
Dept: URBAN - METROPOLITAN AREA HOSPITAL 84 | Facility: HOSPITAL | Age: 75
End: 2020-01-01

## 2020-01-01 ENCOUNTER — HOSPITAL ENCOUNTER (OUTPATIENT)
Dept: ONCOLOGY | Facility: HOSPITAL | Age: 75
Setting detail: INFUSION SERIES
Discharge: HOME | End: 2020-03-20

## 2020-01-01 VITALS
TEMPERATURE: 97.5 F | DIASTOLIC BLOOD PRESSURE: 77 MMHG | SYSTOLIC BLOOD PRESSURE: 131 MMHG | BODY MASS INDEX: 33.08 KG/M2 | WEIGHT: 244.2 LBS | OXYGEN SATURATION: 97 % | RESPIRATION RATE: 18 BRPM | HEART RATE: 84 BPM | HEIGHT: 72 IN

## 2020-01-01 VITALS
HEIGHT: 73 IN | HEART RATE: 132 BPM | BODY MASS INDEX: 35.25 KG/M2 | DIASTOLIC BLOOD PRESSURE: 84 MMHG | WEIGHT: 266 LBS | SYSTOLIC BLOOD PRESSURE: 162 MMHG

## 2020-01-01 VITALS
BODY MASS INDEX: 35.65 KG/M2 | SYSTOLIC BLOOD PRESSURE: 117 MMHG | RESPIRATION RATE: 20 BRPM | HEART RATE: 63 BPM | DIASTOLIC BLOOD PRESSURE: 60 MMHG | WEIGHT: 268.96 LBS | TEMPERATURE: 97.9 F | OXYGEN SATURATION: 95 % | HEIGHT: 73 IN

## 2020-01-01 VITALS
BODY MASS INDEX: 34.86 KG/M2 | DIASTOLIC BLOOD PRESSURE: 80 MMHG | TEMPERATURE: 97.3 F | HEIGHT: 72 IN | RESPIRATION RATE: 16 BRPM | HEART RATE: 62 BPM | SYSTOLIC BLOOD PRESSURE: 123 MMHG

## 2020-01-01 VITALS
HEIGHT: 70 IN | DIASTOLIC BLOOD PRESSURE: 16 MMHG | RESPIRATION RATE: 22 BRPM | BODY MASS INDEX: 34.72 KG/M2 | TEMPERATURE: 97.9 F | SYSTOLIC BLOOD PRESSURE: 71 MMHG | WEIGHT: 242.51 LBS | HEART RATE: 60 BPM | OXYGEN SATURATION: 46 %

## 2020-01-01 VITALS
SYSTOLIC BLOOD PRESSURE: 120 MMHG | DIASTOLIC BLOOD PRESSURE: 70 MMHG | HEART RATE: 105 BPM | WEIGHT: 256 LBS | HEIGHT: 73 IN | BODY MASS INDEX: 33.93 KG/M2

## 2020-01-01 VITALS
WEIGHT: 258 LBS | OXYGEN SATURATION: 96 % | HEIGHT: 72 IN | DIASTOLIC BLOOD PRESSURE: 76 MMHG | HEART RATE: 60 BPM | BODY MASS INDEX: 34.95 KG/M2 | SYSTOLIC BLOOD PRESSURE: 133 MMHG

## 2020-01-01 VITALS
DIASTOLIC BLOOD PRESSURE: 57 MMHG | OXYGEN SATURATION: 93 % | SYSTOLIC BLOOD PRESSURE: 135 MMHG | BODY MASS INDEX: 35.18 KG/M2 | TEMPERATURE: 98 F | RESPIRATION RATE: 19 BRPM | HEART RATE: 94 BPM | HEIGHT: 73 IN | WEIGHT: 265.43 LBS

## 2020-01-01 VITALS
TEMPERATURE: 97.7 F | HEIGHT: 73 IN | WEIGHT: 255.4 LBS | DIASTOLIC BLOOD PRESSURE: 81 MMHG | SYSTOLIC BLOOD PRESSURE: 173 MMHG | RESPIRATION RATE: 24 BRPM | HEART RATE: 41 BPM | BODY MASS INDEX: 33.85 KG/M2

## 2020-01-01 VITALS
TEMPERATURE: 97.6 F | DIASTOLIC BLOOD PRESSURE: 85 MMHG | SYSTOLIC BLOOD PRESSURE: 130 MMHG | WEIGHT: 257.94 LBS | BODY MASS INDEX: 34.94 KG/M2 | RESPIRATION RATE: 18 BRPM | HEIGHT: 72 IN | HEART RATE: 64 BPM

## 2020-01-01 VITALS
DIASTOLIC BLOOD PRESSURE: 68 MMHG | WEIGHT: 256 LBS | HEIGHT: 73 IN | HEART RATE: 42 BPM | BODY MASS INDEX: 33.93 KG/M2 | SYSTOLIC BLOOD PRESSURE: 112 MMHG

## 2020-01-01 VITALS
BODY MASS INDEX: 33.15 KG/M2 | RESPIRATION RATE: 18 BRPM | HEIGHT: 72 IN | DIASTOLIC BLOOD PRESSURE: 66 MMHG | SYSTOLIC BLOOD PRESSURE: 100 MMHG | TEMPERATURE: 97.3 F | HEART RATE: 74 BPM | WEIGHT: 244.71 LBS

## 2020-01-01 VITALS
HEIGHT: 73 IN | BODY MASS INDEX: 34.09 KG/M2 | SYSTOLIC BLOOD PRESSURE: 137 MMHG | DIASTOLIC BLOOD PRESSURE: 87 MMHG | WEIGHT: 257.2 LBS | RESPIRATION RATE: 19 BRPM | HEART RATE: 124 BPM | OXYGEN SATURATION: 96 % | TEMPERATURE: 98.2 F

## 2020-01-01 VITALS
HEART RATE: 64 BPM | RESPIRATION RATE: 17 BRPM | SYSTOLIC BLOOD PRESSURE: 143 MMHG | TEMPERATURE: 98 F | WEIGHT: 257.06 LBS | OXYGEN SATURATION: 95 % | BODY MASS INDEX: 34.82 KG/M2 | HEIGHT: 72 IN | DIASTOLIC BLOOD PRESSURE: 62 MMHG

## 2020-01-01 DIAGNOSIS — C71.9 GLIOBLASTOMA MULTIFORME (HCC): ICD-10-CM

## 2020-01-01 DIAGNOSIS — G93.89 MASS OF BRAIN: Primary | ICD-10-CM

## 2020-01-01 DIAGNOSIS — I49.5 SICK SINUS SYNDROME (HCC): Primary | ICD-10-CM

## 2020-01-01 DIAGNOSIS — J18.8 OTHER PNEUMONIA, UNSPECIFIED ORGANISM: ICD-10-CM

## 2020-01-01 DIAGNOSIS — F51.01 PRIMARY INSOMNIA: ICD-10-CM

## 2020-01-01 DIAGNOSIS — Z85.038 HISTORY OF COLON CANCER: Primary | ICD-10-CM

## 2020-01-01 DIAGNOSIS — I48.91 ATRIAL FIBRILLATION, UNSPECIFIED TYPE (HCC): Primary | ICD-10-CM

## 2020-01-01 DIAGNOSIS — I50.9 CONGESTIVE HEART FAILURE, UNSPECIFIED HF CHRONICITY, UNSPECIFIED HEART FAILURE TYPE (HCC): ICD-10-CM

## 2020-01-01 DIAGNOSIS — J18.9 COMMUNITY ACQUIRED PNEUMONIA, UNSPECIFIED LATERALITY: ICD-10-CM

## 2020-01-01 DIAGNOSIS — C71.9 GLIOBLASTOMA DETERMINED BY BIOPSY OF BRAIN (HCC): Primary | ICD-10-CM

## 2020-01-01 DIAGNOSIS — C71.9 GLIOBLASTOMA DETERMINED BY BIOPSY OF BRAIN (HCC): ICD-10-CM

## 2020-01-01 DIAGNOSIS — I48.20 ATRIAL FIBRILLATION, CHRONIC (HCC): ICD-10-CM

## 2020-01-01 DIAGNOSIS — C71.9 GLIOBLASTOMA MULTIFORME (HCC): Primary | ICD-10-CM

## 2020-01-01 DIAGNOSIS — R50.9 FEVER, UNSPECIFIED FEVER CAUSE: ICD-10-CM

## 2020-01-01 DIAGNOSIS — I49.5 SICK SINUS SYNDROME (HCC): ICD-10-CM

## 2020-01-01 DIAGNOSIS — C71.9 GBM (GLIOBLASTOMA MULTIFORME) (HCC): Primary | ICD-10-CM

## 2020-01-01 DIAGNOSIS — R20.2 PARESTHESIAS: ICD-10-CM

## 2020-01-01 DIAGNOSIS — C71.9 GLIOBLASTOMA (HCC): Primary | ICD-10-CM

## 2020-01-01 DIAGNOSIS — R06.03 RESPIRATORY DISTRESS: ICD-10-CM

## 2020-01-01 DIAGNOSIS — R19.5 OTHER FECAL ABNORMALITIES: ICD-10-CM

## 2020-01-01 DIAGNOSIS — I45.5 SINUS PAUSE: ICD-10-CM

## 2020-01-01 DIAGNOSIS — A41.9 SEPSIS, DUE TO UNSPECIFIED ORGANISM, UNSPECIFIED WHETHER ACUTE ORGAN DYSFUNCTION PRESENT (HCC): Primary | ICD-10-CM

## 2020-01-01 DIAGNOSIS — I49.5 TACHYCARDIA-BRADYCARDIA (HCC): ICD-10-CM

## 2020-01-01 DIAGNOSIS — N28.9 RENAL INSUFFICIENCY: ICD-10-CM

## 2020-01-01 DIAGNOSIS — R65.21 SEPTIC SHOCK (HCC): ICD-10-CM

## 2020-01-01 DIAGNOSIS — I25.10 CHRONIC CORONARY ARTERY DISEASE: Primary | ICD-10-CM

## 2020-01-01 DIAGNOSIS — E78.2 MIXED HYPERLIPIDEMIA: ICD-10-CM

## 2020-01-01 DIAGNOSIS — I75.023 ATHEROEMBOLISM OF BILATERAL LOWER EXTREMITIES (HCC): ICD-10-CM

## 2020-01-01 DIAGNOSIS — A41.9 SEPSIS, UNSPECIFIED ORGANISM: ICD-10-CM

## 2020-01-01 DIAGNOSIS — I48.0 PAROXYSMAL ATRIAL FIBRILLATION (HCC): ICD-10-CM

## 2020-01-01 DIAGNOSIS — R20.9 COLD EXTREMITY WITHOUT PERIPHERAL VASCULAR DISEASE: Primary | ICD-10-CM

## 2020-01-01 DIAGNOSIS — I48.91 ATRIAL FIBRILLATION WITH RVR (HCC): ICD-10-CM

## 2020-01-01 DIAGNOSIS — Z95.0 PACEMAKER: ICD-10-CM

## 2020-01-01 DIAGNOSIS — J96.01 ACUTE RESPIRATORY FAILURE WITH HYPOXIA (HCC): ICD-10-CM

## 2020-01-01 DIAGNOSIS — A41.9 SEPTIC SHOCK (HCC): ICD-10-CM

## 2020-01-01 DIAGNOSIS — D50.0 IRON DEFICIENCY ANEMIA SECONDARY TO BLOOD LOSS (CHRONIC): ICD-10-CM

## 2020-01-01 DIAGNOSIS — Z85.038 HISTORY OF COLON CANCER: ICD-10-CM

## 2020-01-01 DIAGNOSIS — Z45.2 ENCOUNTER FOR FITTING AND ADJUSTMENT OF VASCULAR CATHETER: ICD-10-CM

## 2020-01-01 DIAGNOSIS — I10 ESSENTIAL HYPERTENSION: ICD-10-CM

## 2020-01-01 DIAGNOSIS — J18.9 PNEUMONIA OF LEFT LOWER LOBE DUE TO INFECTIOUS ORGANISM: ICD-10-CM

## 2020-01-01 DIAGNOSIS — I48.0 PAROXYSMAL ATRIAL FIBRILLATION (HCC): Primary | ICD-10-CM

## 2020-01-01 LAB
ABO GROUP BLD: NORMAL
ABO GROUP BLD: NORMAL
ADV 40+41 DNA STL QL NAA+NON-PROBE: NOT DETECTED
ALBUMIN SERPL-MCNC: 2.2 G/DL (ref 2.9–4.4)
ALBUMIN SERPL-MCNC: 2.4 G/DL (ref 3.5–5.2)
ALBUMIN SERPL-MCNC: 2.5 G/DL (ref 3.5–5.2)
ALBUMIN SERPL-MCNC: 3.5 G/DL (ref 3.5–5.2)
ALBUMIN SERPL-MCNC: 3.7 G/DL (ref 3.5–5.2)
ALBUMIN SERPL-MCNC: 3.8 G/DL (ref 3.5–5.2)
ALBUMIN SERPL-MCNC: 4.1 G/DL (ref 3.5–5.2)
ALBUMIN SERPL-MCNC: 4.1 G/DL (ref 3.5–5.2)
ALBUMIN/GLOB SERPL: 0.6 G/DL
ALBUMIN/GLOB SERPL: 0.6 G/DL
ALBUMIN/GLOB SERPL: 0.7 G/DL
ALBUMIN/GLOB SERPL: 0.7 {RATIO} (ref 0.7–1.7)
ALBUMIN/GLOB SERPL: 0.8 G/DL
ALBUMIN/GLOB SERPL: 0.8 G/DL
ALBUMIN/GLOB SERPL: 1.1 G/DL
ALBUMIN/GLOB SERPL: 1.2 G/DL
ALBUMIN/GLOB SERPL: 1.3 G/DL
ALBUMIN/GLOB SERPL: 1.4 G/DL
ALBUMIN/GLOB SERPL: 1.4 G/DL
ALBUMIN/GLOB SERPL: 1.6 G/DL
ALP SERPL-CCNC: 125 U/L (ref 39–117)
ALP SERPL-CCNC: 128 U/L (ref 39–117)
ALP SERPL-CCNC: 132 U/L (ref 39–117)
ALP SERPL-CCNC: 148 U/L (ref 39–117)
ALP SERPL-CCNC: 153 U/L (ref 39–117)
ALP SERPL-CCNC: 176 U/L (ref 39–117)
ALP SERPL-CCNC: 178 U/L (ref 39–117)
ALP SERPL-CCNC: 228 U/L (ref 39–117)
ALP SERPL-CCNC: 246 U/L (ref 39–117)
ALP SERPL-CCNC: 54 U/L (ref 39–117)
ALP SERPL-CCNC: 57 U/L (ref 39–117)
ALP SERPL-CCNC: 59 U/L (ref 39–117)
ALP SERPL-CCNC: 63 U/L (ref 39–117)
ALPHA1 GLOB FLD ELPH-MCNC: 0.6 G/DL (ref 0–0.4)
ALPHA2 GLOB SERPL ELPH-MCNC: 1.2 G/DL (ref 0.4–1)
ALT SERPL W P-5'-P-CCNC: 100 U/L (ref 1–41)
ALT SERPL W P-5'-P-CCNC: 104 U/L (ref 1–41)
ALT SERPL W P-5'-P-CCNC: 111 U/L (ref 1–41)
ALT SERPL W P-5'-P-CCNC: 115 U/L (ref 1–41)
ALT SERPL W P-5'-P-CCNC: 17 U/L (ref 1–41)
ALT SERPL W P-5'-P-CCNC: 173 U/L (ref 1–41)
ALT SERPL W P-5'-P-CCNC: 19 U/L (ref 1–41)
ALT SERPL W P-5'-P-CCNC: 19 U/L (ref 1–41)
ALT SERPL W P-5'-P-CCNC: 24 U/L (ref 1–41)
ALT SERPL W P-5'-P-CCNC: 34 U/L (ref 1–41)
ALT SERPL W P-5'-P-CCNC: 40 U/L (ref 1–41)
ALT SERPL W P-5'-P-CCNC: 73 U/L (ref 1–41)
ALT SERPL W P-5'-P-CCNC: 75 U/L (ref 1–41)
ALT SERPL W P-5'-P-CCNC: 76 U/L (ref 1–41)
ALT SERPL W P-5'-P-CCNC: 94 U/L (ref 1–41)
AMMONIA BLD-SCNC: 26 UMOL/L (ref 16–60)
AMPHET+METHAMPHET UR QL: NEGATIVE
ANION GAP SERPL CALCULATED.3IONS-SCNC: 10 MMOL/L (ref 5–15)
ANION GAP SERPL CALCULATED.3IONS-SCNC: 10 MMOL/L (ref 5–15)
ANION GAP SERPL CALCULATED.3IONS-SCNC: 11 MMOL/L (ref 5–15)
ANION GAP SERPL CALCULATED.3IONS-SCNC: 11 MMOL/L (ref 5–15)
ANION GAP SERPL CALCULATED.3IONS-SCNC: 12 MMOL/L (ref 5–15)
ANION GAP SERPL CALCULATED.3IONS-SCNC: 12 MMOL/L (ref 5–15)
ANION GAP SERPL CALCULATED.3IONS-SCNC: 13 MMOL/L (ref 5–15)
ANION GAP SERPL CALCULATED.3IONS-SCNC: 14 MMOL/L (ref 5–15)
ANION GAP SERPL CALCULATED.3IONS-SCNC: 14 MMOL/L (ref 5–15)
ANION GAP SERPL CALCULATED.3IONS-SCNC: 16 MMOL/L (ref 5–15)
ANION GAP SERPL CALCULATED.3IONS-SCNC: 16 MMOL/L (ref 5–15)
ANION GAP SERPL CALCULATED.3IONS-SCNC: 17 MMOL/L (ref 5–15)
ANION GAP SERPL CALCULATED.3IONS-SCNC: 18 MMOL/L (ref 5–15)
ANION GAP SERPL CALCULATED.3IONS-SCNC: 20 MMOL/L (ref 5–15)
ANION GAP SERPL CALCULATED.3IONS-SCNC: 20 MMOL/L (ref 5–15)
ANION GAP SERPL CALCULATED.3IONS-SCNC: 21 MMOL/L (ref 5–15)
ANION GAP SERPL CALCULATED.3IONS-SCNC: 21 MMOL/L (ref 5–15)
ANION GAP SERPL CALCULATED.3IONS-SCNC: 22 MMOL/L (ref 5–15)
ANISOCYTOSIS BLD QL: ABNORMAL
APPEARANCE FLD: ABNORMAL
APTT PPP: 21.7 SECONDS (ref 61–76.5)
APTT PPP: 22.1 SECONDS (ref 24–31)
APTT PPP: 24.1 SECONDS (ref 24–31)
APTT PPP: 26.3 SECONDS (ref 24–31)
APTT PPP: 27.5 SECONDS (ref 61–76.5)
APTT PPP: 30.2 SECONDS (ref 24–31)
APTT PPP: 39.3 SECONDS (ref 24–31)
APTT PPP: 41 SECONDS (ref 61–76.5)
APTT PPP: 44 SECONDS (ref 24–31)
APTT PPP: 45.1 SECONDS (ref 61–76.5)
APTT PPP: 45.3 SECONDS (ref 24–31)
APTT PPP: 59.5 SECONDS (ref 61–76.5)
APTT PPP: 64.6 SECONDS (ref 61–76.5)
APTT PPP: 66.3 SECONDS (ref 61–76.5)
APTT PPP: 73 SECONDS (ref 61–76.5)
ARTERIAL PATENCY WRIST A: ABNORMAL
ARTERIAL PATENCY WRIST A: NEGATIVE
ARTERIAL PATENCY WRIST A: POSITIVE
AST SERPL-CCNC: 10 U/L (ref 1–40)
AST SERPL-CCNC: 11 U/L (ref 1–40)
AST SERPL-CCNC: 12 U/L (ref 1–40)
AST SERPL-CCNC: 120 U/L (ref 1–40)
AST SERPL-CCNC: 136 U/L (ref 1–40)
AST SERPL-CCNC: 15 U/L (ref 1–40)
AST SERPL-CCNC: 17 U/L (ref 1–40)
AST SERPL-CCNC: 17 U/L (ref 1–40)
AST SERPL-CCNC: 272 U/L (ref 1–40)
AST SERPL-CCNC: 43 U/L (ref 1–40)
AST SERPL-CCNC: 44 U/L (ref 1–40)
AST SERPL-CCNC: 64 U/L (ref 1–40)
AST SERPL-CCNC: 89 U/L (ref 1–40)
AST SERPL-CCNC: 94 U/L (ref 1–40)
AST SERPL-CCNC: 99 U/L (ref 1–40)
ASTRO TYP 1-8 RNA STL QL NAA+NON-PROBE: NOT DETECTED
ATMOSPHERIC PRESS: ABNORMAL MM[HG]
B PERT DNA SPEC QL NAA+PROBE: NOT DETECTED
B PERT DNA SPEC QL NAA+PROBE: NOT DETECTED
B-GLOBULIN SERPL ELPH-MCNC: 0.8 G/DL (ref 0.7–1.3)
BACTERIA SPEC AEROBE CULT: ABNORMAL
BACTERIA SPEC AEROBE CULT: NORMAL
BACTERIA SPEC AEROBE CULT: NORMAL
BACTERIA SPEC RESP CULT: NORMAL
BACTERIA UR QL AUTO: ABNORMAL /HPF
BARBITURATES UR QL SCN: NEGATIVE
BASE EXCESS BLDA CALC-SCNC: -1.5 MMOL/L (ref 0–3)
BASE EXCESS BLDA CALC-SCNC: -1.6 MMOL/L (ref 0–3)
BASE EXCESS BLDA CALC-SCNC: -3.1 MMOL/L (ref 0–3)
BASE EXCESS BLDA CALC-SCNC: -3.8 MMOL/L (ref 0–3)
BASE EXCESS BLDA CALC-SCNC: -3.8 MMOL/L (ref 0–3)
BASE EXCESS BLDA CALC-SCNC: -4.6 MMOL/L (ref 0–3)
BASE EXCESS BLDA CALC-SCNC: -6.9 MMOL/L (ref 0–3)
BASE EXCESS BLDA CALC-SCNC: -7 MMOL/L (ref 0–3)
BASO STIPL COARSE BLD QL SMEAR: ABNORMAL
BASOPHILS # BLD AUTO: 0 10*3/MM3 (ref 0–0.2)
BASOPHILS # BLD AUTO: 0.02 10*3/MM3 (ref 0–0.2)
BASOPHILS # BLD AUTO: 0.03 10*3/MM3 (ref 0–0.2)
BASOPHILS # BLD AUTO: 0.03 10*3/MM3 (ref 0–0.2)
BASOPHILS # BLD AUTO: 0.1 10*3/MM3 (ref 0–0.2)
BASOPHILS NFR BLD AUTO: 0.1 % (ref 0–1.5)
BASOPHILS NFR BLD AUTO: 0.2 % (ref 0–1.5)
BASOPHILS NFR BLD AUTO: 0.3 % (ref 0–1.5)
BASOPHILS NFR BLD AUTO: 0.3 % (ref 0–1.5)
BASOPHILS NFR BLD AUTO: 0.4 % (ref 0–1.5)
BASOPHILS NFR BLD AUTO: 0.4 % (ref 0–1.5)
BASOPHILS NFR BLD AUTO: 0.5 % (ref 0–1.5)
BASOPHILS NFR BLD AUTO: 0.5 % (ref 0–1.5)
BDY SITE: ABNORMAL
BENZODIAZ UR QL SCN: POSITIVE
BH CV ECHO MEAS - ACS: 2.3 CM
BH CV ECHO MEAS - AO MAX PG (FULL): 4.4 MMHG
BH CV ECHO MEAS - AO MAX PG: 7 MMHG
BH CV ECHO MEAS - AO MEAN PG (FULL): 2.1 MMHG
BH CV ECHO MEAS - AO MEAN PG: 3.6 MMHG
BH CV ECHO MEAS - AO ROOT AREA (BSA CORRECTED): 1.6
BH CV ECHO MEAS - AO ROOT AREA: 11.3 CM^2
BH CV ECHO MEAS - AO ROOT DIAM: 3.8 CM
BH CV ECHO MEAS - AO V2 MAX: 132.2 CM/SEC
BH CV ECHO MEAS - AO V2 MEAN: 89.7 CM/SEC
BH CV ECHO MEAS - AO V2 VTI: 25.2 CM
BH CV ECHO MEAS - AORTIC HR: 194.6 BPM
BH CV ECHO MEAS - AORTIC R-R: 0.31 SEC
BH CV ECHO MEAS - ASC AORTA: 3.9 CM
BH CV ECHO MEAS - AVA(I,A): 3.6 CM^2
BH CV ECHO MEAS - AVA(I,D): 3.6 CM^2
BH CV ECHO MEAS - AVA(V,A): 3.3 CM^2
BH CV ECHO MEAS - AVA(V,D): 3.3 CM^2
BH CV ECHO MEAS - BSA(HAYCOCK): 2.5 M^2
BH CV ECHO MEAS - BSA: 2.4 M^2
BH CV ECHO MEAS - BZI_BMI: 35.5 KILOGRAMS/M^2
BH CV ECHO MEAS - BZI_METRIC_HEIGHT: 182.9 CM
BH CV ECHO MEAS - BZI_METRIC_WEIGHT: 118.8 KG
BH CV ECHO MEAS - CI(AO): 23.2 L/MIN/M^2
BH CV ECHO MEAS - CI(LVOT): 7.4 L/MIN/M^2
BH CV ECHO MEAS - CO(AO): 55.4 L/MIN
BH CV ECHO MEAS - CO(LVOT): 17.6 L/MIN
BH CV ECHO MEAS - EDV(CUBED): 48.5 ML
BH CV ECHO MEAS - EDV(MOD-SP2): 67.3 ML
BH CV ECHO MEAS - EDV(MOD-SP4): 86.3 ML
BH CV ECHO MEAS - EDV(TEICH): 56.1 ML
BH CV ECHO MEAS - EF(CUBED): 30.7 %
BH CV ECHO MEAS - EF(MOD-BP): 56 %
BH CV ECHO MEAS - EF(MOD-SP2): 51 %
BH CV ECHO MEAS - EF(MOD-SP4): 60.7 %
BH CV ECHO MEAS - EF(TEICH): 25.5 %
BH CV ECHO MEAS - ESV(CUBED): 33.6 ML
BH CV ECHO MEAS - ESV(MOD-SP2): 33 ML
BH CV ECHO MEAS - ESV(MOD-SP4): 33.9 ML
BH CV ECHO MEAS - ESV(TEICH): 41.8 ML
BH CV ECHO MEAS - FS: 11.5 %
BH CV ECHO MEAS - IVS/LVPW: 1.1
BH CV ECHO MEAS - IVSD: 1.4 CM
BH CV ECHO MEAS - LA DIMENSION(2D): 4.1 CM
BH CV ECHO MEAS - LV DIASTOLIC VOL/BSA (35-75): 36.1 ML/M^2
BH CV ECHO MEAS - LV MASS(C)D: 171.9 GRAMS
BH CV ECHO MEAS - LV MASS(C)DI: 71.9 GRAMS/M^2
BH CV ECHO MEAS - LV MAX PG: 2.6 MMHG
BH CV ECHO MEAS - LV MEAN PG: 1.5 MMHG
BH CV ECHO MEAS - LV SYSTOLIC VOL/BSA (12-30): 14.2 ML/M^2
BH CV ECHO MEAS - LV V1 MAX: 80.4 CM/SEC
BH CV ECHO MEAS - LV V1 MEAN: 56.9 CM/SEC
BH CV ECHO MEAS - LV V1 VTI: 16.9 CM
BH CV ECHO MEAS - LVIDD: 3.6 CM
BH CV ECHO MEAS - LVIDS: 3.2 CM
BH CV ECHO MEAS - LVOT AREA: 5.4 CM^2
BH CV ECHO MEAS - LVOT DIAM: 2.6 CM
BH CV ECHO MEAS - LVPWD: 1.3 CM
BH CV ECHO MEAS - MV DEC TIME: 0.15 SEC
BH CV ECHO MEAS - MV E MAX VEL: 91.4 CM/SEC
BH CV ECHO MEAS - MV MAX PG: 4 MMHG
BH CV ECHO MEAS - MV MEAN PG: 1.2 MMHG
BH CV ECHO MEAS - MV V2 MAX: 99.8 CM/SEC
BH CV ECHO MEAS - MV V2 MEAN: 47.4 CM/SEC
BH CV ECHO MEAS - MV V2 VTI: 19.1 CM
BH CV ECHO MEAS - MVA(VTI): 4.8 CM^2
BH CV ECHO MEAS - PA ACC TIME: 0.08 SEC
BH CV ECHO MEAS - PA MAX PG (FULL): 1.4 MMHG
BH CV ECHO MEAS - PA MAX PG: 4.3 MMHG
BH CV ECHO MEAS - PA PR(ACCEL): 41.3 MMHG
BH CV ECHO MEAS - PA V2 MAX: 104.2 CM/SEC
BH CV ECHO MEAS - PVA(V,A): 7 CM^2
BH CV ECHO MEAS - PVA(V,D): 7 CM^2
BH CV ECHO MEAS - QP/QS: 1.4
BH CV ECHO MEAS - RAP SYSTOLE: 8 MMHG
BH CV ECHO MEAS - RV MAX PG: 2.9 MMHG
BH CV ECHO MEAS - RV MEAN PG: 1.5 MMHG
BH CV ECHO MEAS - RV V1 MAX: 85.3 CM/SEC
BH CV ECHO MEAS - RV V1 MEAN: 55.9 CM/SEC
BH CV ECHO MEAS - RV V1 VTI: 14.5 CM
BH CV ECHO MEAS - RVDD: 3.1 CM
BH CV ECHO MEAS - RVOT AREA: 8.5 CM^2
BH CV ECHO MEAS - RVOT DIAM: 3.3 CM
BH CV ECHO MEAS - RVSP: 31.4 MMHG
BH CV ECHO MEAS - SI(AO): 119.1 ML/M^2
BH CV ECHO MEAS - SI(CUBED): 6.2 ML/M^2
BH CV ECHO MEAS - SI(LVOT): 38 ML/M^2
BH CV ECHO MEAS - SI(MOD-SP2): 14.4 ML/M^2
BH CV ECHO MEAS - SI(MOD-SP4): 21.9 ML/M^2
BH CV ECHO MEAS - SI(TEICH): 6 ML/M^2
BH CV ECHO MEAS - SV(AO): 284.5 ML
BH CV ECHO MEAS - SV(CUBED): 14.9 ML
BH CV ECHO MEAS - SV(LVOT): 90.7 ML
BH CV ECHO MEAS - SV(MOD-SP2): 34.3 ML
BH CV ECHO MEAS - SV(MOD-SP4): 52.4 ML
BH CV ECHO MEAS - SV(RVOT): 122.9 ML
BH CV ECHO MEAS - SV(TEICH): 14.3 ML
BH CV ECHO MEAS - TR MAX VEL: 241.6 CM/SEC
BILIRUB CONJ SERPL-MCNC: 0.3 MG/DL (ref 0.2–0.3)
BILIRUB CONJ SERPL-MCNC: 0.4 MG/DL (ref 0.2–0.3)
BILIRUB INDIRECT SERPL-MCNC: 0.3 MG/DL
BILIRUB INDIRECT SERPL-MCNC: 0.8 MG/DL
BILIRUB SERPL-MCNC: 0.6 MG/DL (ref 0.2–1.2)
BILIRUB SERPL-MCNC: 0.6 MG/DL (ref 0.2–1.2)
BILIRUB SERPL-MCNC: 0.7 MG/DL (ref 0.2–1.2)
BILIRUB SERPL-MCNC: 0.7 MG/DL (ref 0.2–1.2)
BILIRUB SERPL-MCNC: 0.9 MG/DL (ref 0.2–1.2)
BILIRUB SERPL-MCNC: 1 MG/DL (ref 0.2–1.2)
BILIRUB SERPL-MCNC: 1.1 MG/DL (ref 0.2–1.2)
BILIRUB SERPL-MCNC: 1.1 MG/DL (ref 0.2–1.2)
BILIRUB SERPL-MCNC: 1.2 MG/DL (ref 0.2–1.2)
BILIRUB SERPL-MCNC: 1.3 MG/DL (ref 0.2–1.2)
BILIRUB SERPL-MCNC: 1.4 MG/DL (ref 0.2–1.2)
BILIRUB SERPL-MCNC: 1.6 MG/DL (ref 0.2–1.2)
BILIRUB SERPL-MCNC: 1.8 MG/DL (ref 0.2–1.2)
BILIRUB SERPL-MCNC: 1.8 MG/DL (ref 0.2–1.2)
BILIRUB SERPL-MCNC: 3.4 MG/DL (ref 0.2–1.2)
BILIRUB UR QL STRIP: ABNORMAL
BILIRUB UR QL STRIP: NEGATIVE
BLD GP AB SCN SERPL QL: NEGATIVE
BLD GP AB SCN SERPL QL: NEGATIVE
BUN BLD-MCNC: 115 MG/DL (ref 8–23)
BUN BLD-MCNC: 124 MG/DL (ref 8–23)
BUN BLD-MCNC: 22 MG/DL (ref 8–23)
BUN BLD-MCNC: 23 MG/DL (ref 8–23)
BUN BLD-MCNC: 24 MG/DL (ref 8–23)
BUN BLD-MCNC: 25 MG/DL (ref 8–23)
BUN BLD-MCNC: 26 MG/DL (ref 8–23)
BUN BLD-MCNC: 26 MG/DL (ref 8–23)
BUN BLD-MCNC: 28 MG/DL (ref 8–23)
BUN BLD-MCNC: 29 MG/DL (ref 8–23)
BUN BLD-MCNC: 29 MG/DL (ref 8–23)
BUN BLD-MCNC: 30 MG/DL (ref 8–23)
BUN BLD-MCNC: 32 MG/DL (ref 8–23)
BUN BLD-MCNC: 32 MG/DL (ref 8–23)
BUN BLD-MCNC: 34 MG/DL (ref 8–23)
BUN BLD-MCNC: 35 MG/DL (ref 8–23)
BUN BLD-MCNC: 35 MG/DL (ref 8–23)
BUN BLD-MCNC: 36 MG/DL (ref 8–23)
BUN BLD-MCNC: 47 MG/DL (ref 8–23)
BUN BLD-MCNC: 64 MG/DL (ref 8–23)
BUN BLD-MCNC: 67 MG/DL (ref 8–23)
BUN BLD-MCNC: 89 MG/DL (ref 8–23)
BUN BLD-MCNC: 90 MG/DL (ref 8–23)
BUN BLD-MCNC: 96 MG/DL (ref 8–23)
BUN BLD-MCNC: 97 MG/DL (ref 8–23)
BUN/CREAT SERPL: 14.9 (ref 7–25)
BUN/CREAT SERPL: 20.1 (ref 7–25)
BUN/CREAT SERPL: 20.8 (ref 7–25)
BUN/CREAT SERPL: 21.7 (ref 7–25)
BUN/CREAT SERPL: 22.5 (ref 7–25)
BUN/CREAT SERPL: 22.8 (ref 7–25)
BUN/CREAT SERPL: 23.3 (ref 7–25)
BUN/CREAT SERPL: 24 (ref 7–25)
BUN/CREAT SERPL: 24.1 (ref 7–25)
BUN/CREAT SERPL: 25 (ref 7–25)
BUN/CREAT SERPL: 26.7 (ref 7–25)
BUN/CREAT SERPL: 27.3 (ref 7–25)
BUN/CREAT SERPL: 27.3 (ref 7–25)
BUN/CREAT SERPL: 27.5 (ref 7–25)
BUN/CREAT SERPL: 28.4 (ref 7–25)
BUN/CREAT SERPL: 28.6 (ref 7–25)
BUN/CREAT SERPL: 28.8 (ref 7–25)
BUN/CREAT SERPL: 28.9 (ref 7–25)
BUN/CREAT SERPL: 30.5 (ref 7–25)
BUN/CREAT SERPL: 31.4 (ref 7–25)
BUN/CREAT SERPL: 32.9 (ref 7–25)
BUN/CREAT SERPL: 33 (ref 7–25)
BUN/CREAT SERPL: 36.2 (ref 7–25)
BUN/CREAT SERPL: 36.8 (ref 7–25)
BUN/CREAT SERPL: 38.5 (ref 7–25)
BURR CELLS BLD QL SMEAR: ABNORMAL
BURR CELLS BLD QL SMEAR: ABNORMAL
C CAYETANENSIS DNA STL QL NAA+NON-PROBE: NOT DETECTED
C PNEUM DNA NPH QL NAA+NON-PROBE: NOT DETECTED
C PNEUM DNA NPH QL NAA+NON-PROBE: NOT DETECTED
CA-I BLDA-SCNC: 1.04 MMOL/L (ref 1.15–1.33)
CA-I BLDA-SCNC: 1.04 MMOL/L (ref 1.15–1.33)
CA-I SERPL ISE-MCNC: 1.07 MMOL/L (ref 1.2–1.3)
CALCIUM SPEC-SCNC: 10 MG/DL (ref 8.6–10.5)
CALCIUM SPEC-SCNC: 10.1 MG/DL (ref 8.6–10.5)
CALCIUM SPEC-SCNC: 7.9 MG/DL (ref 8.6–10.5)
CALCIUM SPEC-SCNC: 8.2 MG/DL (ref 8.6–10.5)
CALCIUM SPEC-SCNC: 8.3 MG/DL (ref 8.6–10.5)
CALCIUM SPEC-SCNC: 8.4 MG/DL (ref 8.6–10.5)
CALCIUM SPEC-SCNC: 8.6 MG/DL (ref 8.6–10.5)
CALCIUM SPEC-SCNC: 8.8 MG/DL (ref 8.6–10.5)
CALCIUM SPEC-SCNC: 9 MG/DL (ref 8.6–10.5)
CALCIUM SPEC-SCNC: 9 MG/DL (ref 8.6–10.5)
CALCIUM SPEC-SCNC: 9.1 MG/DL (ref 8.6–10.5)
CALCIUM SPEC-SCNC: 9.1 MG/DL (ref 8.6–10.5)
CALCIUM SPEC-SCNC: 9.2 MG/DL (ref 8.6–10.5)
CALCIUM SPEC-SCNC: 9.2 MG/DL (ref 8.6–10.5)
CALCIUM SPEC-SCNC: 9.3 MG/DL (ref 8.6–10.5)
CALCIUM SPEC-SCNC: 9.3 MG/DL (ref 8.6–10.5)
CALCIUM SPEC-SCNC: 9.4 MG/DL (ref 8.6–10.5)
CALCIUM SPEC-SCNC: 9.4 MG/DL (ref 8.6–10.5)
CALCIUM SPEC-SCNC: 9.5 MG/DL (ref 8.6–10.5)
CALCIUM SPEC-SCNC: 9.5 MG/DL (ref 8.6–10.5)
CALCIUM SPEC-SCNC: 9.6 MG/DL (ref 8.6–10.5)
CALCIUM SPEC-SCNC: 9.7 MG/DL (ref 8.6–10.5)
CALCIUM SPEC-SCNC: 9.7 MG/DL (ref 8.6–10.5)
CALCIUM SPEC-SCNC: 9.8 MG/DL (ref 8.6–10.5)
CALCIUM SPEC-SCNC: 9.9 MG/DL (ref 8.6–10.5)
CAMPY SP DNA.DIARRHEA STL QL NAA+PROBE: NOT DETECTED
CANNABINOIDS SERPL QL: NEGATIVE
CHLORIDE SERPL-SCNC: 100 MMOL/L (ref 98–107)
CHLORIDE SERPL-SCNC: 101 MMOL/L (ref 98–107)
CHLORIDE SERPL-SCNC: 101 MMOL/L (ref 98–107)
CHLORIDE SERPL-SCNC: 102 MMOL/L (ref 98–107)
CHLORIDE SERPL-SCNC: 103 MMOL/L (ref 98–107)
CHLORIDE SERPL-SCNC: 108 MMOL/L (ref 98–107)
CHLORIDE SERPL-SCNC: 109 MMOL/L (ref 98–107)
CHLORIDE SERPL-SCNC: 110 MMOL/L (ref 98–107)
CHLORIDE SERPL-SCNC: 111 MMOL/L (ref 98–107)
CHLORIDE SERPL-SCNC: 112 MMOL/L (ref 98–107)
CHLORIDE SERPL-SCNC: 96 MMOL/L (ref 98–107)
CHLORIDE SERPL-SCNC: 97 MMOL/L (ref 98–107)
CHLORIDE SERPL-SCNC: 98 MMOL/L (ref 98–107)
CHLORIDE UR-SCNC: <20 MMOL/L
CHOLEST SERPL-MCNC: 120 MG/DL (ref 0–200)
CHOLEST SERPL-MCNC: 124 MG/DL (ref 0–200)
CHOLEST SERPL-MCNC: 98 MG/DL (ref 0–200)
CK SERPL-CCNC: 24 U/L (ref 20–200)
CK SERPL-CCNC: 26 U/L (ref 20–200)
CK SERPL-CCNC: 33 U/L (ref 20–200)
CK SERPL-CCNC: 47 U/L (ref 20–200)
CLARITY UR: ABNORMAL
CLARITY UR: CLEAR
CO2 BLDA-SCNC: 16.9 MMOL/L (ref 22–29)
CO2 BLDA-SCNC: 19.9 MMOL/L (ref 22–29)
CO2 BLDA-SCNC: 20.6 MMOL/L (ref 22–29)
CO2 BLDA-SCNC: 20.7 MMOL/L (ref 22–29)
CO2 BLDA-SCNC: 21.3 MMOL/L (ref 22–29)
CO2 BLDA-SCNC: 21.9 MMOL/L (ref 22–29)
CO2 BLDA-SCNC: 22.2 MMOL/L (ref 22–29)
CO2 BLDA-SCNC: 23.5 MMOL/L (ref 22–29)
CO2 SERPL-SCNC: 15 MMOL/L (ref 22–29)
CO2 SERPL-SCNC: 15 MMOL/L (ref 22–29)
CO2 SERPL-SCNC: 17 MMOL/L (ref 22–29)
CO2 SERPL-SCNC: 17 MMOL/L (ref 22–29)
CO2 SERPL-SCNC: 18 MMOL/L (ref 22–29)
CO2 SERPL-SCNC: 18 MMOL/L (ref 22–29)
CO2 SERPL-SCNC: 19 MMOL/L (ref 22–29)
CO2 SERPL-SCNC: 19 MMOL/L (ref 22–29)
CO2 SERPL-SCNC: 20 MMOL/L (ref 22–29)
CO2 SERPL-SCNC: 20 MMOL/L (ref 22–29)
CO2 SERPL-SCNC: 22 MMOL/L (ref 22–29)
CO2 SERPL-SCNC: 24 MMOL/L (ref 22–29)
CO2 SERPL-SCNC: 25 MMOL/L (ref 22–29)
CO2 SERPL-SCNC: 26 MMOL/L (ref 22–29)
CO2 SERPL-SCNC: 27 MMOL/L (ref 22–29)
CO2 SERPL-SCNC: 29 MMOL/L (ref 22–29)
COCAINE UR QL: NEGATIVE
COLOR FLD: ABNORMAL
COLOR UR: ABNORMAL
COLOR UR: YELLOW
CORTIS SERPL-MCNC: 52.29 MCG/DL
CREAT BLD-MCNC: 0.69 MG/DL (ref 0.76–1.27)
CREAT BLD-MCNC: 0.7 MG/DL (ref 0.76–1.27)
CREAT BLD-MCNC: 0.73 MG/DL (ref 0.76–1.27)
CREAT BLD-MCNC: 0.78 MG/DL (ref 0.76–1.27)
CREAT BLD-MCNC: 0.87 MG/DL (ref 0.76–1.27)
CREAT BLD-MCNC: 0.88 MG/DL (ref 0.76–1.27)
CREAT BLD-MCNC: 0.91 MG/DL (ref 0.76–1.27)
CREAT BLD-MCNC: 1.01 MG/DL (ref 0.76–1.27)
CREAT BLD-MCNC: 1.02 MG/DL (ref 0.76–1.27)
CREAT BLD-MCNC: 1.13 MG/DL (ref 0.76–1.27)
CREAT BLD-MCNC: 1.16 MG/DL (ref 0.76–1.27)
CREAT BLD-MCNC: 1.18 MG/DL (ref 0.76–1.27)
CREAT BLD-MCNC: 1.2 MG/DL (ref 0.76–1.27)
CREAT BLD-MCNC: 1.21 MG/DL (ref 0.76–1.27)
CREAT BLD-MCNC: 1.28 MG/DL (ref 0.76–1.27)
CREAT BLD-MCNC: 1.29 MG/DL (ref 0.76–1.27)
CREAT BLD-MCNC: 1.5 MG/DL (ref 0.76–1.27)
CREAT BLD-MCNC: 1.54 MG/DL (ref 0.76–1.27)
CREAT BLD-MCNC: 1.59 MG/DL (ref 0.76–1.27)
CREAT BLD-MCNC: 1.75 MG/DL (ref 0.76–1.27)
CREAT BLD-MCNC: 2.25 MG/DL (ref 0.76–1.27)
CREAT BLD-MCNC: 2.44 MG/DL (ref 0.76–1.27)
CREAT BLD-MCNC: 3.3 MG/DL (ref 0.76–1.27)
CREAT BLD-MCNC: 3.41 MG/DL (ref 0.76–1.27)
CREAT BLD-MCNC: 3.59 MG/DL (ref 0.76–1.27)
CREAT BLD-MCNC: 4.27 MG/DL (ref 0.76–1.27)
CREAT BLD-MCNC: 5.3 MG/DL (ref 0.76–1.27)
CREAT BLD-MCNC: 5.52 MG/DL (ref 0.76–1.27)
CRP SERPL-MCNC: 12.5 MG/DL (ref 0–0.5)
CRP SERPL-MCNC: 31.51 MG/DL (ref 0–0.5)
CRP SERPL-MCNC: 33.74 MG/DL (ref 0–0.5)
CRP SERPL-MCNC: 42.53 MG/DL (ref 0–0.5)
CRYPTOSP STL CULT: NOT DETECTED
D DIMER PPP FEU-MCNC: 1.73 MCGFEU/ML (ref 0.17–0.59)
D DIMER PPP FEU-MCNC: 2.31 MCGFEU/ML (ref 0.17–0.59)
D DIMER PPP FEU-MCNC: 3.33 MCGFEU/ML (ref 0.17–0.59)
D DIMER PPP FEU-MCNC: 8.19 MCGFEU/ML (ref 0.17–0.59)
D-LACTATE SERPL-SCNC: 1.5 MMOL/L (ref 0.5–2)
D-LACTATE SERPL-SCNC: 1.5 MMOL/L (ref 0.5–2)
D-LACTATE SERPL-SCNC: 1.6 MMOL/L (ref 0.5–2)
D-LACTATE SERPL-SCNC: 1.8 MMOL/L (ref 0.5–2)
D-LACTATE SERPL-SCNC: 1.8 MMOL/L (ref 0.5–2)
D-LACTATE SERPL-SCNC: 1.9 MMOL/L (ref 0.5–2)
D-LACTATE SERPL-SCNC: 2.2 MMOL/L (ref 0.5–2)
D-LACTATE SERPL-SCNC: 2.4 MMOL/L (ref 0.5–2)
D-LACTATE SERPL-SCNC: 2.8 MMOL/L (ref 0.5–2)
D-LACTATE SERPL-SCNC: 3 MMOL/L (ref 0.5–2)
D-LACTATE SERPL-SCNC: 3.1 MMOL/L (ref 0.5–2)
D-LACTATE SERPL-SCNC: 3.3 MMOL/L (ref 0.5–2)
D-LACTATE SERPL-SCNC: 3.5 MMOL/L (ref 0.5–2)
D-LACTATE SERPL-SCNC: 3.6 MMOL/L (ref 0.5–2)
D-LACTATE SERPL-SCNC: 4.7 MMOL/L (ref 0.5–2)
DEPRECATED RDW RBC AUTO: 48.2 FL (ref 37–54)
DEPRECATED RDW RBC AUTO: 49.4 FL (ref 37–54)
DEPRECATED RDW RBC AUTO: 49.4 FL (ref 37–54)
DEPRECATED RDW RBC AUTO: 49.9 FL (ref 37–54)
DEPRECATED RDW RBC AUTO: 51.2 FL (ref 37–54)
DEPRECATED RDW RBC AUTO: 52.5 FL (ref 37–54)
DEPRECATED RDW RBC AUTO: 52.5 FL (ref 37–54)
DEPRECATED RDW RBC AUTO: 53.3 FL (ref 37–54)
DEPRECATED RDW RBC AUTO: 53.8 FL (ref 37–54)
DEPRECATED RDW RBC AUTO: 53.8 FL (ref 37–54)
DEPRECATED RDW RBC AUTO: 54.3 FL (ref 37–54)
DEPRECATED RDW RBC AUTO: 55.1 FL (ref 37–54)
DEPRECATED RDW RBC AUTO: 55.6 FL (ref 37–54)
DEPRECATED RDW RBC AUTO: 56.4 FL (ref 37–54)
DEPRECATED RDW RBC AUTO: 56.9 FL (ref 37–54)
DEPRECATED RDW RBC AUTO: 57.1 FL (ref 37–54)
DEPRECATED RDW RBC AUTO: 57.8 FL (ref 37–54)
DEPRECATED RDW RBC AUTO: 58.3 FL (ref 37–54)
DEPRECATED RDW RBC AUTO: 58.6 FL (ref 37–54)
DEPRECATED RDW RBC AUTO: 59.1 FL (ref 37–54)
DEPRECATED RDW RBC AUTO: 59.1 FL (ref 37–54)
DEPRECATED RDW RBC AUTO: 59.5 FL (ref 37–54)
DEPRECATED RDW RBC AUTO: 60 FL (ref 37–54)
DIGOXIN SERPL-MCNC: 0.6 NG/ML (ref 0.6–1.2)
DIGOXIN SERPL-MCNC: 1.2 NG/ML (ref 0.6–1.2)
E COLI DNA SPEC QL NAA+PROBE: NOT DETECTED
E HISTOLYT AG STL-ACNC: NOT DETECTED
EAEC PAA PLAS AGGR+AATA ST NAA+NON-PRB: NOT DETECTED
EC STX1 + STX2 GENES STL NAA+PROBE: NOT DETECTED
EOSINOPHIL # BLD AUTO: 0 10*3/MM3 (ref 0–0.4)
EOSINOPHIL # BLD AUTO: 0.01 10*3/MM3 (ref 0–0.4)
EOSINOPHIL # BLD AUTO: 0.05 10*3/MM3 (ref 0–0.4)
EOSINOPHIL # BLD AUTO: 0.05 10*3/MM3 (ref 0–0.4)
EOSINOPHIL # BLD AUTO: 0.06 10*3/MM3 (ref 0–0.4)
EOSINOPHIL # BLD AUTO: 0.1 10*3/MM3 (ref 0–0.4)
EOSINOPHIL # BLD AUTO: 0.1 10*3/MM3 (ref 0–0.4)
EOSINOPHIL # BLD AUTO: 0.14 10*3/MM3 (ref 0–0.4)
EOSINOPHIL NFR BLD AUTO: 0 % (ref 0.3–6.2)
EOSINOPHIL NFR BLD AUTO: 0.1 % (ref 0.3–6.2)
EOSINOPHIL NFR BLD AUTO: 0.1 % (ref 0.3–6.2)
EOSINOPHIL NFR BLD AUTO: 0.2 % (ref 0.3–6.2)
EOSINOPHIL NFR BLD AUTO: 0.3 % (ref 0.3–6.2)
EOSINOPHIL NFR BLD AUTO: 0.4 % (ref 0.3–6.2)
EOSINOPHIL NFR BLD AUTO: 0.5 % (ref 0.3–6.2)
EOSINOPHIL NFR BLD AUTO: 0.6 % (ref 0.3–6.2)
EOSINOPHIL NFR BLD AUTO: 0.9 % (ref 0.3–6.2)
EOSINOPHIL NFR BLD AUTO: 1 % (ref 0.3–6.2)
EOSINOPHIL NFR BLD AUTO: 1.2 % (ref 0.3–6.2)
EPEC EAE GENE STL QL NAA+NON-PROBE: NOT DETECTED
ERYTHROCYTE [DISTWIDTH] IN BLOOD BY AUTOMATED COUNT: 15.7 % (ref 12.3–15.4)
ERYTHROCYTE [DISTWIDTH] IN BLOOD BY AUTOMATED COUNT: 16.4 % (ref 12.3–15.4)
ERYTHROCYTE [DISTWIDTH] IN BLOOD BY AUTOMATED COUNT: 16.7 % (ref 12.3–15.4)
ERYTHROCYTE [DISTWIDTH] IN BLOOD BY AUTOMATED COUNT: 16.9 % (ref 12.3–15.4)
ERYTHROCYTE [DISTWIDTH] IN BLOOD BY AUTOMATED COUNT: 17 % (ref 12.3–15.4)
ERYTHROCYTE [DISTWIDTH] IN BLOOD BY AUTOMATED COUNT: 17 % (ref 12.3–15.4)
ERYTHROCYTE [DISTWIDTH] IN BLOOD BY AUTOMATED COUNT: 17.1 % (ref 12.3–15.4)
ERYTHROCYTE [DISTWIDTH] IN BLOOD BY AUTOMATED COUNT: 17.1 % (ref 12.3–15.4)
ERYTHROCYTE [DISTWIDTH] IN BLOOD BY AUTOMATED COUNT: 17.3 % (ref 12.3–15.4)
ERYTHROCYTE [DISTWIDTH] IN BLOOD BY AUTOMATED COUNT: 17.3 % (ref 12.3–15.4)
ERYTHROCYTE [DISTWIDTH] IN BLOOD BY AUTOMATED COUNT: 17.4 % (ref 12.3–15.4)
ERYTHROCYTE [DISTWIDTH] IN BLOOD BY AUTOMATED COUNT: 17.4 % (ref 12.3–15.4)
ERYTHROCYTE [DISTWIDTH] IN BLOOD BY AUTOMATED COUNT: 17.5 % (ref 12.3–15.4)
ERYTHROCYTE [DISTWIDTH] IN BLOOD BY AUTOMATED COUNT: 17.5 % (ref 12.3–15.4)
ERYTHROCYTE [DISTWIDTH] IN BLOOD BY AUTOMATED COUNT: 17.6 % (ref 12.3–15.4)
ERYTHROCYTE [DISTWIDTH] IN BLOOD BY AUTOMATED COUNT: 17.7 % (ref 12.3–15.4)
ERYTHROCYTE [DISTWIDTH] IN BLOOD BY AUTOMATED COUNT: 17.7 % (ref 12.3–15.4)
ERYTHROCYTE [DISTWIDTH] IN BLOOD BY AUTOMATED COUNT: 17.8 % (ref 12.3–15.4)
ERYTHROCYTE [DISTWIDTH] IN BLOOD BY AUTOMATED COUNT: 17.9 % (ref 12.3–15.4)
ERYTHROCYTE [DISTWIDTH] IN BLOOD BY AUTOMATED COUNT: 18.1 % (ref 12.3–15.4)
ERYTHROCYTE [DISTWIDTH] IN BLOOD BY AUTOMATED COUNT: 18.1 % (ref 12.3–15.4)
ERYTHROCYTE [DISTWIDTH] IN BLOOD BY AUTOMATED COUNT: 18.4 % (ref 12.3–15.4)
ERYTHROCYTE [DISTWIDTH] IN BLOOD BY AUTOMATED COUNT: 18.4 % (ref 12.3–15.4)
ERYTHROCYTE [DISTWIDTH] IN BLOOD BY AUTOMATED COUNT: 18.9 % (ref 12.3–15.4)
ERYTHROCYTE [DISTWIDTH] IN BLOOD BY AUTOMATED COUNT: 19 % (ref 12.3–15.4)
ERYTHROCYTE [DISTWIDTH] IN BLOOD BY AUTOMATED COUNT: 19.1 % (ref 12.3–15.4)
ERYTHROCYTE [DISTWIDTH] IN BLOOD BY AUTOMATED COUNT: 19.6 % (ref 12.3–15.4)
ERYTHROCYTE [SEDIMENTATION RATE] IN BLOOD: 46 MM/HR (ref 0–20)
ETEC LTA+ST1A+ST1B TOX ST NAA+NON-PROBE: NOT DETECTED
FERRITIN SERPL-MCNC: 1197 NG/ML (ref 30–400)
FERRITIN SERPL-MCNC: 1327 NG/ML (ref 30–400)
FERRITIN SERPL-MCNC: 1656 NG/ML (ref 30–400)
FERRITIN SERPL-MCNC: 2478 NG/ML (ref 30–400)
FERRITIN SERPL-MCNC: 3061 NG/ML (ref 30–400)
FIBRINOGEN PPP-MCNC: 567 MG/DL (ref 210–450)
FIBRINOGEN PPP-MCNC: 950 MG/DL (ref 210–450)
FLUAV H1 2009 PAND RNA NPH QL NAA+PROBE: NOT DETECTED
FLUAV H1 2009 PAND RNA NPH QL NAA+PROBE: NOT DETECTED
FLUAV H1 HA GENE NPH QL NAA+PROBE: NOT DETECTED
FLUAV H1 HA GENE NPH QL NAA+PROBE: NOT DETECTED
FLUAV H3 RNA NPH QL NAA+PROBE: NOT DETECTED
FLUAV H3 RNA NPH QL NAA+PROBE: NOT DETECTED
FLUAV SUBTYP SPEC NAA+PROBE: NOT DETECTED
FLUAV SUBTYP SPEC NAA+PROBE: NOT DETECTED
FLUBV RNA ISLT QL NAA+PROBE: NOT DETECTED
FLUBV RNA ISLT QL NAA+PROBE: NOT DETECTED
FOLATE SERPL-MCNC: 7.99 NG/ML (ref 4.78–24.2)
G LAMBLIA DNA SPEC QL NAA+PROBE: NOT DETECTED
GAMMA GLOB SERPL ELPH-MCNC: 0.4 G/DL (ref 0.4–1.8)
GFR SERPL CREATININE-BSD FRML MDRD: 10 ML/MIN/1.73
GFR SERPL CREATININE-BSD FRML MDRD: 106 ML/MIN/1.73
GFR SERPL CREATININE-BSD FRML MDRD: 11 ML/MIN/1.73
GFR SERPL CREATININE-BSD FRML MDRD: 111 ML/MIN/1.73
GFR SERPL CREATININE-BSD FRML MDRD: 113 ML/MIN/1.73
GFR SERPL CREATININE-BSD FRML MDRD: 14 ML/MIN/1.73
GFR SERPL CREATININE-BSD FRML MDRD: 17 ML/MIN/1.73
GFR SERPL CREATININE-BSD FRML MDRD: 18 ML/MIN/1.73
GFR SERPL CREATININE-BSD FRML MDRD: 19 ML/MIN/1.73
GFR SERPL CREATININE-BSD FRML MDRD: 26 ML/MIN/1.73
GFR SERPL CREATININE-BSD FRML MDRD: 29 ML/MIN/1.73
GFR SERPL CREATININE-BSD FRML MDRD: 39 ML/MIN/1.73
GFR SERPL CREATININE-BSD FRML MDRD: 43 ML/MIN/1.73
GFR SERPL CREATININE-BSD FRML MDRD: 45 ML/MIN/1.73
GFR SERPL CREATININE-BSD FRML MDRD: 46 ML/MIN/1.73
GFR SERPL CREATININE-BSD FRML MDRD: 55 ML/MIN/1.73
GFR SERPL CREATININE-BSD FRML MDRD: 55 ML/MIN/1.73
GFR SERPL CREATININE-BSD FRML MDRD: 59 ML/MIN/1.73
GFR SERPL CREATININE-BSD FRML MDRD: 60 ML/MIN/1.73
GFR SERPL CREATININE-BSD FRML MDRD: 61 ML/MIN/1.73
GFR SERPL CREATININE-BSD FRML MDRD: 62 ML/MIN/1.73
GFR SERPL CREATININE-BSD FRML MDRD: 64 ML/MIN/1.73
GFR SERPL CREATININE-BSD FRML MDRD: 72 ML/MIN/1.73
GFR SERPL CREATININE-BSD FRML MDRD: 73 ML/MIN/1.73
GFR SERPL CREATININE-BSD FRML MDRD: 82 ML/MIN/1.73
GFR SERPL CREATININE-BSD FRML MDRD: 85 ML/MIN/1.73
GFR SERPL CREATININE-BSD FRML MDRD: 86 ML/MIN/1.73
GFR SERPL CREATININE-BSD FRML MDRD: 98 ML/MIN/1.73
GFR SERPL CREATININE-BSD FRML MDRD: ABNORMAL ML/MIN/{1.73_M2}
GIANT PLATELETS: ABNORMAL
GLOBULIN SER CALC-MCNC: 3 G/DL (ref 2.2–3.9)
GLOBULIN UR ELPH-MCNC: 2.6 GM/DL
GLOBULIN UR ELPH-MCNC: 2.7 GM/DL
GLOBULIN UR ELPH-MCNC: 2.9 GM/DL
GLOBULIN UR ELPH-MCNC: 2.9 GM/DL
GLOBULIN UR ELPH-MCNC: 3 GM/DL
GLOBULIN UR ELPH-MCNC: 3.1 GM/DL
GLOBULIN UR ELPH-MCNC: 3.2 GM/DL
GLOBULIN UR ELPH-MCNC: 3.3 GM/DL
GLOBULIN UR ELPH-MCNC: 3.4 GM/DL
GLOBULIN UR ELPH-MCNC: 3.4 GM/DL
GLOBULIN UR ELPH-MCNC: 3.6 GM/DL
GLOBULIN UR ELPH-MCNC: 3.9 GM/DL
GLOBULIN UR ELPH-MCNC: 3.9 GM/DL
GLUCOSE BLD-MCNC: 105 MG/DL (ref 65–99)
GLUCOSE BLD-MCNC: 109 MG/DL (ref 65–99)
GLUCOSE BLD-MCNC: 111 MG/DL (ref 65–99)
GLUCOSE BLD-MCNC: 114 MG/DL (ref 65–99)
GLUCOSE BLD-MCNC: 117 MG/DL (ref 65–99)
GLUCOSE BLD-MCNC: 120 MG/DL (ref 65–99)
GLUCOSE BLD-MCNC: 120 MG/DL (ref 65–99)
GLUCOSE BLD-MCNC: 123 MG/DL (ref 65–99)
GLUCOSE BLD-MCNC: 126 MG/DL (ref 65–99)
GLUCOSE BLD-MCNC: 126 MG/DL (ref 65–99)
GLUCOSE BLD-MCNC: 131 MG/DL (ref 65–99)
GLUCOSE BLD-MCNC: 134 MG/DL (ref 65–99)
GLUCOSE BLD-MCNC: 135 MG/DL (ref 65–99)
GLUCOSE BLD-MCNC: 138 MG/DL (ref 65–99)
GLUCOSE BLD-MCNC: 139 MG/DL (ref 65–99)
GLUCOSE BLD-MCNC: 140 MG/DL (ref 65–99)
GLUCOSE BLD-MCNC: 141 MG/DL (ref 65–99)
GLUCOSE BLD-MCNC: 145 MG/DL (ref 65–99)
GLUCOSE BLD-MCNC: 149 MG/DL (ref 65–99)
GLUCOSE BLD-MCNC: 154 MG/DL (ref 65–99)
GLUCOSE BLD-MCNC: 154 MG/DL (ref 65–99)
GLUCOSE BLD-MCNC: 157 MG/DL (ref 65–99)
GLUCOSE BLD-MCNC: 158 MG/DL (ref 65–99)
GLUCOSE BLD-MCNC: 158 MG/DL (ref 65–99)
GLUCOSE BLD-MCNC: 166 MG/DL (ref 65–99)
GLUCOSE BLD-MCNC: 193 MG/DL (ref 65–99)
GLUCOSE BLD-MCNC: 226 MG/DL (ref 65–99)
GLUCOSE BLDC GLUCOMTR-MCNC: 102 MG/DL (ref 70–105)
GLUCOSE BLDC GLUCOMTR-MCNC: 103 MG/DL (ref 70–105)
GLUCOSE BLDC GLUCOMTR-MCNC: 105 MG/DL (ref 70–105)
GLUCOSE BLDC GLUCOMTR-MCNC: 106 MG/DL (ref 70–105)
GLUCOSE BLDC GLUCOMTR-MCNC: 106 MG/DL (ref 70–105)
GLUCOSE BLDC GLUCOMTR-MCNC: 108 MG/DL (ref 70–105)
GLUCOSE BLDC GLUCOMTR-MCNC: 112 MG/DL (ref 70–105)
GLUCOSE BLDC GLUCOMTR-MCNC: 113 MG/DL (ref 70–105)
GLUCOSE BLDC GLUCOMTR-MCNC: 115 MG/DL (ref 70–105)
GLUCOSE BLDC GLUCOMTR-MCNC: 116 MG/DL (ref 70–105)
GLUCOSE BLDC GLUCOMTR-MCNC: 118 MG/DL (ref 70–105)
GLUCOSE BLDC GLUCOMTR-MCNC: 119 MG/DL (ref 70–105)
GLUCOSE BLDC GLUCOMTR-MCNC: 120 MG/DL (ref 70–105)
GLUCOSE BLDC GLUCOMTR-MCNC: 122 MG/DL (ref 70–105)
GLUCOSE BLDC GLUCOMTR-MCNC: 125 MG/DL (ref 70–105)
GLUCOSE BLDC GLUCOMTR-MCNC: 125 MG/DL (ref 70–105)
GLUCOSE BLDC GLUCOMTR-MCNC: 127 MG/DL (ref 70–105)
GLUCOSE BLDC GLUCOMTR-MCNC: 128 MG/DL (ref 70–105)
GLUCOSE BLDC GLUCOMTR-MCNC: 129 MG/DL (ref 70–105)
GLUCOSE BLDC GLUCOMTR-MCNC: 129 MG/DL (ref 70–105)
GLUCOSE BLDC GLUCOMTR-MCNC: 130 MG/DL (ref 70–105)
GLUCOSE BLDC GLUCOMTR-MCNC: 132 MG/DL (ref 70–105)
GLUCOSE BLDC GLUCOMTR-MCNC: 132 MG/DL (ref 70–105)
GLUCOSE BLDC GLUCOMTR-MCNC: 134 MG/DL (ref 70–105)
GLUCOSE BLDC GLUCOMTR-MCNC: 134 MG/DL (ref 70–105)
GLUCOSE BLDC GLUCOMTR-MCNC: 135 MG/DL (ref 70–105)
GLUCOSE BLDC GLUCOMTR-MCNC: 138 MG/DL (ref 70–105)
GLUCOSE BLDC GLUCOMTR-MCNC: 139 MG/DL (ref 70–105)
GLUCOSE BLDC GLUCOMTR-MCNC: 140 MG/DL (ref 70–105)
GLUCOSE BLDC GLUCOMTR-MCNC: 141 MG/DL (ref 70–105)
GLUCOSE BLDC GLUCOMTR-MCNC: 142 MG/DL (ref 74–100)
GLUCOSE BLDC GLUCOMTR-MCNC: 143 MG/DL (ref 70–105)
GLUCOSE BLDC GLUCOMTR-MCNC: 144 MG/DL (ref 70–105)
GLUCOSE BLDC GLUCOMTR-MCNC: 146 MG/DL (ref 70–105)
GLUCOSE BLDC GLUCOMTR-MCNC: 146 MG/DL (ref 70–105)
GLUCOSE BLDC GLUCOMTR-MCNC: 151 MG/DL (ref 70–105)
GLUCOSE BLDC GLUCOMTR-MCNC: 152 MG/DL (ref 70–105)
GLUCOSE BLDC GLUCOMTR-MCNC: 154 MG/DL (ref 70–105)
GLUCOSE BLDC GLUCOMTR-MCNC: 154 MG/DL (ref 70–105)
GLUCOSE BLDC GLUCOMTR-MCNC: 155 MG/DL (ref 70–105)
GLUCOSE BLDC GLUCOMTR-MCNC: 155 MG/DL (ref 70–105)
GLUCOSE BLDC GLUCOMTR-MCNC: 159 MG/DL (ref 70–105)
GLUCOSE BLDC GLUCOMTR-MCNC: 164 MG/DL (ref 70–105)
GLUCOSE BLDC GLUCOMTR-MCNC: 167 MG/DL (ref 70–105)
GLUCOSE BLDC GLUCOMTR-MCNC: 167 MG/DL (ref 70–105)
GLUCOSE BLDC GLUCOMTR-MCNC: 180 MG/DL (ref 70–105)
GLUCOSE BLDC GLUCOMTR-MCNC: 186 MG/DL (ref 70–105)
GLUCOSE BLDC GLUCOMTR-MCNC: 192 MG/DL (ref 70–105)
GLUCOSE BLDC GLUCOMTR-MCNC: 193 MG/DL (ref 70–105)
GLUCOSE BLDC GLUCOMTR-MCNC: 201 MG/DL (ref 70–105)
GLUCOSE BLDC GLUCOMTR-MCNC: 206 MG/DL (ref 74–100)
GLUCOSE BLDC GLUCOMTR-MCNC: 210 MG/DL (ref 70–105)
GLUCOSE BLDC GLUCOMTR-MCNC: 247 MG/DL (ref 70–105)
GLUCOSE BLDC GLUCOMTR-MCNC: 85 MG/DL (ref 70–105)
GLUCOSE BLDC GLUCOMTR-MCNC: 86 MG/DL (ref 70–105)
GLUCOSE BLDC GLUCOMTR-MCNC: 93 MG/DL (ref 70–105)
GLUCOSE BLDC GLUCOMTR-MCNC: 97 MG/DL (ref 70–105)
GLUCOSE BLDC GLUCOMTR-MCNC: 98 MG/DL (ref 70–105)
GLUCOSE UR STRIP-MCNC: NEGATIVE MG/DL
GRAM STN SPEC: NORMAL
GRAM STN SPEC: NORMAL
GRAN CASTS URNS QL MICRO: ABNORMAL /LPF
HADV DNA SPEC NAA+PROBE: NOT DETECTED
HADV DNA SPEC NAA+PROBE: NOT DETECTED
HAPTOGLOB SERPL-MCNC: 171 MG/DL (ref 30–200)
HCO3 BLDA-SCNC: 16.3 MMOL/L (ref 21–28)
HCO3 BLDA-SCNC: 19 MMOL/L (ref 21–28)
HCO3 BLDA-SCNC: 19.4 MMOL/L (ref 21–28)
HCO3 BLDA-SCNC: 19.8 MMOL/L (ref 21–28)
HCO3 BLDA-SCNC: 19.9 MMOL/L (ref 21–28)
HCO3 BLDA-SCNC: 21.1 MMOL/L (ref 21–28)
HCO3 BLDA-SCNC: 21.1 MMOL/L (ref 21–28)
HCO3 BLDA-SCNC: 22.2 MMOL/L (ref 21–28)
HCOV 229E RNA SPEC QL NAA+PROBE: NOT DETECTED
HCOV 229E RNA SPEC QL NAA+PROBE: NOT DETECTED
HCOV HKU1 RNA SPEC QL NAA+PROBE: NOT DETECTED
HCOV HKU1 RNA SPEC QL NAA+PROBE: NOT DETECTED
HCOV NL63 RNA SPEC QL NAA+PROBE: NOT DETECTED
HCOV NL63 RNA SPEC QL NAA+PROBE: NOT DETECTED
HCOV OC43 RNA SPEC QL NAA+PROBE: NOT DETECTED
HCOV OC43 RNA SPEC QL NAA+PROBE: NOT DETECTED
HCT VFR BLD AUTO: 21.4 % (ref 37.5–51)
HCT VFR BLD AUTO: 22.2 % (ref 37.5–51)
HCT VFR BLD AUTO: 22.3 % (ref 37.5–51)
HCT VFR BLD AUTO: 22.5 % (ref 37.5–51)
HCT VFR BLD AUTO: 22.5 % (ref 37.5–51)
HCT VFR BLD AUTO: 23.5 % (ref 37.5–51)
HCT VFR BLD AUTO: 25.4 % (ref 37.5–51)
HCT VFR BLD AUTO: 26 % (ref 37.5–51)
HCT VFR BLD AUTO: 26.9 % (ref 37.5–51)
HCT VFR BLD AUTO: 27.6 % (ref 37.5–51)
HCT VFR BLD AUTO: 27.8 % (ref 37.5–51)
HCT VFR BLD AUTO: 28.8 % (ref 37.5–51)
HCT VFR BLD AUTO: 31.9 % (ref 37.5–51)
HCT VFR BLD AUTO: 34 % (ref 37.5–51)
HCT VFR BLD AUTO: 34.8 % (ref 37.5–51)
HCT VFR BLD AUTO: 36.9 % (ref 37.5–51)
HCT VFR BLD AUTO: 41 % (ref 37.5–51)
HCT VFR BLD AUTO: 41 % (ref 37.5–51)
HCT VFR BLD AUTO: 41.3 % (ref 37.5–51)
HCT VFR BLD AUTO: 41.6 % (ref 37.5–51)
HCT VFR BLD AUTO: 41.9 % (ref 37.5–51)
HCT VFR BLD AUTO: 42 % (ref 37.5–51)
HCT VFR BLD AUTO: 42.1 % (ref 37.5–51)
HCT VFR BLD AUTO: 42.4 % (ref 37.5–51)
HCT VFR BLD AUTO: 43.2 % (ref 37.5–51)
HCT VFR BLD AUTO: 43.3 % (ref 37.5–51)
HCT VFR BLD AUTO: 43.4 % (ref 37.5–51)
HCT VFR BLD AUTO: 43.4 % (ref 37.5–51)
HCT VFR BLD AUTO: 43.7 % (ref 37.5–51)
HCT VFR BLD AUTO: 43.7 % (ref 37.5–51)
HCT VFR BLD AUTO: 44 % (ref 37.5–51)
HCT VFR BLD AUTO: 44.1 % (ref 37.5–51)
HCT VFR BLD AUTO: 45.3 % (ref 37.5–51)
HCT VFR BLD AUTO: 45.5 % (ref 37.5–51)
HCT VFR BLD AUTO: 46 % (ref 37.5–51)
HCT VFR BLD AUTO: 46.9 % (ref 37.5–51)
HCT VFR BLD AUTO: 47.1 % (ref 37.5–51)
HCT VFR BLDA CALC: 35 % (ref 38–51)
HCT VFR BLDA CALC: 47 % (ref 38–51)
HDLC SERPL-MCNC: 32 MG/DL (ref 40–60)
HDLC SERPL-MCNC: 38 MG/DL (ref 40–60)
HDLC SERPL-MCNC: 41 MG/DL (ref 40–60)
HEMOCCULT STL QL IA: POSITIVE
HEMODILUTION: NO
HGB BLD-MCNC: 10.7 G/DL (ref 13–17.7)
HGB BLD-MCNC: 11.6 G/DL (ref 13–17.7)
HGB BLD-MCNC: 12.3 G/DL (ref 13–17.7)
HGB BLD-MCNC: 13.3 G/DL (ref 13–17.7)
HGB BLD-MCNC: 13.7 G/DL (ref 13–17.7)
HGB BLD-MCNC: 13.9 G/DL (ref 13–17.7)
HGB BLD-MCNC: 13.9 G/DL (ref 13–17.7)
HGB BLD-MCNC: 14.1 G/DL (ref 13–17.7)
HGB BLD-MCNC: 14.1 G/DL (ref 13–17.7)
HGB BLD-MCNC: 14.2 G/DL (ref 13–17.7)
HGB BLD-MCNC: 14.5 G/DL (ref 13–17.7)
HGB BLD-MCNC: 14.6 G/DL (ref 13–17.7)
HGB BLD-MCNC: 14.6 G/DL (ref 13–17.7)
HGB BLD-MCNC: 14.7 G/DL (ref 13–17.7)
HGB BLD-MCNC: 14.7 G/DL (ref 13–17.7)
HGB BLD-MCNC: 14.8 G/DL (ref 13–17.7)
HGB BLD-MCNC: 14.8 G/DL (ref 13–17.7)
HGB BLD-MCNC: 14.9 G/DL (ref 13–17.7)
HGB BLD-MCNC: 15.1 G/DL (ref 13–17.7)
HGB BLD-MCNC: 15.1 G/DL (ref 13–17.7)
HGB BLD-MCNC: 15.2 G/DL (ref 13–17.7)
HGB BLD-MCNC: 15.4 G/DL (ref 13–17.7)
HGB BLD-MCNC: 15.5 G/DL (ref 13–17.7)
HGB BLD-MCNC: 16 G/DL (ref 13–17.7)
HGB BLD-MCNC: 16.1 G/DL (ref 13–17.7)
HGB BLD-MCNC: 7 G/DL (ref 13–17.7)
HGB BLD-MCNC: 7.1 G/DL (ref 13–17.7)
HGB BLD-MCNC: 7.1 G/DL (ref 13–17.7)
HGB BLD-MCNC: 7.3 G/DL (ref 13–17.7)
HGB BLD-MCNC: 7.4 G/DL (ref 13–17.7)
HGB BLD-MCNC: 7.7 G/DL (ref 13–17.7)
HGB BLD-MCNC: 8.2 G/DL (ref 13–17.7)
HGB BLD-MCNC: 8.7 G/DL (ref 13–17.7)
HGB BLD-MCNC: 8.8 G/DL (ref 13–17.7)
HGB BLD-MCNC: 9 G/DL (ref 13–17.7)
HGB BLD-MCNC: 9 G/DL (ref 13–17.7)
HGB BLD-MCNC: 9.4 G/DL (ref 13–17.7)
HGB BLDA-MCNC: 11.8 G/DL (ref 12–17)
HGB BLDA-MCNC: 16 G/DL (ref 12–17)
HGB UR QL STRIP.AUTO: ABNORMAL
HGB UR QL STRIP.AUTO: ABNORMAL
HGB UR QL STRIP.AUTO: NEGATIVE
HMPV RNA NPH QL NAA+NON-PROBE: NOT DETECTED
HMPV RNA NPH QL NAA+NON-PROBE: NOT DETECTED
HOROWITZ INDEX BLD+IHG-RTO: 30 %
HOROWITZ INDEX BLD+IHG-RTO: 30 %
HOROWITZ INDEX BLD+IHG-RTO: 40 %
HOROWITZ INDEX BLD+IHG-RTO: 60 %
HOROWITZ INDEX BLD+IHG-RTO: <21 %
HPIV1 RNA SPEC QL NAA+PROBE: NOT DETECTED
HPIV1 RNA SPEC QL NAA+PROBE: NOT DETECTED
HPIV2 RNA SPEC QL NAA+PROBE: NOT DETECTED
HPIV2 RNA SPEC QL NAA+PROBE: NOT DETECTED
HPIV3 RNA NPH QL NAA+PROBE: NOT DETECTED
HPIV3 RNA NPH QL NAA+PROBE: NOT DETECTED
HPIV4 P GENE NPH QL NAA+PROBE: NOT DETECTED
HPIV4 P GENE NPH QL NAA+PROBE: NOT DETECTED
HYALINE CASTS UR QL AUTO: ABNORMAL /LPF
HYPOCHROMIA BLD QL: ABNORMAL
INR PPP: 1.06 (ref 0.9–1.1)
INR PPP: 1.11 (ref 0.9–1.1)
INR PPP: 1.18 (ref 0.9–1.1)
INR PPP: 1.26 (ref 0.9–1.1)
INR PPP: 1.33 (ref 0.9–1.1)
INR PPP: 1.4 (ref 0.9–1.1)
INR PPP: 1.5 (ref 0.9–1.1)
INR PPP: 1.56 (ref 0.9–1.1)
IRON 24H UR-MRATE: 46 MCG/DL (ref 59–158)
IRON SATN MFR SERPL: 31 % (ref 20–50)
KETONES UR QL STRIP: ABNORMAL
KETONES UR QL STRIP: ABNORMAL
KETONES UR QL STRIP: NEGATIVE
L PNEUMO1 AG UR QL IA: NEGATIVE
LAB AP CASE REPORT: NORMAL
LAB AP CASE REPORT: NORMAL
LAB AP DIAGNOSIS COMMENT: NORMAL
LACTATE HOLD SPECIMEN: NORMAL
LARGE PLATELETS: ABNORMAL
LDH SERPL-CCNC: 460 U/L (ref 135–225)
LDH SERPL-CCNC: 496 U/L (ref 135–225)
LDH SERPL-CCNC: 511 U/L (ref 135–225)
LDH SERPL-CCNC: 650 U/L (ref 135–225)
LDH SERPL-CCNC: 731 U/L (ref 135–225)
LDLC SERPL CALC-MCNC: 43 MG/DL (ref 0–100)
LDLC SERPL CALC-MCNC: 71 MG/DL (ref 0–100)
LDLC SERPL CALC-MCNC: 75 MG/DL (ref 0–100)
LDLC/HDLC SERPL: 1.04 {RATIO}
LDLC/HDLC SERPL: 1.97 {RATIO}
LDLC/HDLC SERPL: 2.23 {RATIO}
LEUKOCYTE ESTERASE UR QL STRIP.AUTO: ABNORMAL
LEUKOCYTE ESTERASE UR QL STRIP.AUTO: NEGATIVE
LIPASE SERPL-CCNC: 15 U/L (ref 13–60)
LYMPHOCYTES # BLD AUTO: 0.8 10*3/MM3 (ref 0.7–3.1)
LYMPHOCYTES # BLD AUTO: 0.8 10*3/MM3 (ref 0.7–3.1)
LYMPHOCYTES # BLD AUTO: 0.83 10*3/MM3 (ref 0.7–3.1)
LYMPHOCYTES # BLD AUTO: 0.9 10*3/MM3 (ref 0.7–3.1)
LYMPHOCYTES # BLD AUTO: 0.93 10*3/MM3 (ref 0.7–3.1)
LYMPHOCYTES # BLD AUTO: 1 10*3/MM3 (ref 0.7–3.1)
LYMPHOCYTES # BLD AUTO: 1 10*3/MM3 (ref 0.7–3.1)
LYMPHOCYTES # BLD AUTO: 1.03 10*3/MM3 (ref 0.7–3.1)
LYMPHOCYTES # BLD AUTO: 1.09 10*3/MM3 (ref 0.7–3.1)
LYMPHOCYTES # BLD AUTO: 1.1 10*3/MM3 (ref 0.7–3.1)
LYMPHOCYTES # BLD AUTO: 1.1 10*3/MM3 (ref 0.7–3.1)
LYMPHOCYTES # BLD AUTO: 1.2 10*3/MM3 (ref 0.7–3.1)
LYMPHOCYTES # BLD AUTO: 1.2 10*3/MM3 (ref 0.7–3.1)
LYMPHOCYTES # BLD AUTO: 1.28 10*3/MM3 (ref 0.7–3.1)
LYMPHOCYTES # BLD AUTO: 1.3 10*3/MM3 (ref 0.7–3.1)
LYMPHOCYTES # BLD MANUAL: 0 10*3/MM3 (ref 0.7–3.1)
LYMPHOCYTES # BLD MANUAL: 0.18 10*3/MM3 (ref 0.7–3.1)
LYMPHOCYTES # BLD MANUAL: 0.22 10*3/MM3 (ref 0.7–3.1)
LYMPHOCYTES # BLD MANUAL: 0.28 10*3/MM3 (ref 0.7–3.1)
LYMPHOCYTES # BLD MANUAL: 0.36 10*3/MM3 (ref 0.7–3.1)
LYMPHOCYTES # BLD MANUAL: 0.42 10*3/MM3 (ref 0.7–3.1)
LYMPHOCYTES # BLD MANUAL: 0.69 10*3/MM3 (ref 0.7–3.1)
LYMPHOCYTES # BLD MANUAL: 0.71 10*3/MM3 (ref 0.7–3.1)
LYMPHOCYTES # BLD MANUAL: 1.06 10*3/MM3 (ref 0.7–3.1)
LYMPHOCYTES # BLD MANUAL: 1.25 10*3/MM3 (ref 0.7–3.1)
LYMPHOCYTES # BLD MANUAL: 1.34 10*3/MM3 (ref 0.7–3.1)
LYMPHOCYTES # BLD MANUAL: 1.88 10*3/MM3 (ref 0.7–3.1)
LYMPHOCYTES NFR BLD AUTO: 10.2 % (ref 19.6–45.3)
LYMPHOCYTES NFR BLD AUTO: 11.1 % (ref 19.6–45.3)
LYMPHOCYTES NFR BLD AUTO: 15.9 % (ref 19.6–45.3)
LYMPHOCYTES NFR BLD AUTO: 3.7 % (ref 19.6–45.3)
LYMPHOCYTES NFR BLD AUTO: 7.3 % (ref 19.6–45.3)
LYMPHOCYTES NFR BLD AUTO: 7.4 % (ref 19.6–45.3)
LYMPHOCYTES NFR BLD AUTO: 7.7 % (ref 19.6–45.3)
LYMPHOCYTES NFR BLD AUTO: 7.9 % (ref 19.6–45.3)
LYMPHOCYTES NFR BLD AUTO: 8 % (ref 19.6–45.3)
LYMPHOCYTES NFR BLD AUTO: 8.3 % (ref 19.6–45.3)
LYMPHOCYTES NFR BLD AUTO: 8.4 % (ref 19.6–45.3)
LYMPHOCYTES NFR BLD AUTO: 8.9 % (ref 19.6–45.3)
LYMPHOCYTES NFR BLD AUTO: 9 % (ref 19.6–45.3)
LYMPHOCYTES NFR BLD AUTO: 9 % (ref 19.6–45.3)
LYMPHOCYTES NFR BLD AUTO: 9.4 % (ref 19.6–45.3)
LYMPHOCYTES NFR BLD MANUAL: 0 % (ref 19.6–45.3)
LYMPHOCYTES NFR BLD MANUAL: 1 % (ref 19.6–45.3)
LYMPHOCYTES NFR BLD MANUAL: 1 % (ref 19.6–45.3)
LYMPHOCYTES NFR BLD MANUAL: 1 % (ref 5–12)
LYMPHOCYTES NFR BLD MANUAL: 1 % (ref 5–12)
LYMPHOCYTES NFR BLD MANUAL: 11 % (ref 5–12)
LYMPHOCYTES NFR BLD MANUAL: 2 % (ref 19.6–45.3)
LYMPHOCYTES NFR BLD MANUAL: 2 % (ref 19.6–45.3)
LYMPHOCYTES NFR BLD MANUAL: 2 % (ref 5–12)
LYMPHOCYTES NFR BLD MANUAL: 3 % (ref 19.6–45.3)
LYMPHOCYTES NFR BLD MANUAL: 3 % (ref 5–12)
LYMPHOCYTES NFR BLD MANUAL: 4 % (ref 5–12)
LYMPHOCYTES NFR BLD MANUAL: 4 % (ref 5–12)
LYMPHOCYTES NFR BLD MANUAL: 5 % (ref 19.6–45.3)
LYMPHOCYTES NFR BLD MANUAL: 5 % (ref 5–12)
LYMPHOCYTES NFR BLD MANUAL: 6 % (ref 19.6–45.3)
LYMPHOCYTES NFR BLD MANUAL: 6 % (ref 19.6–45.3)
LYMPHOCYTES NFR BLD MANUAL: 7 % (ref 19.6–45.3)
LYMPHOCYTES NFR BLD MANUAL: 8 % (ref 19.6–45.3)
LYMPHOCYTES NFR BLD MANUAL: 8 % (ref 5–12)
LYMPHOCYTES NFR BLD MANUAL: 9 % (ref 19.6–45.3)
LYMPHOCYTES NFR FLD MANUAL: 2 %
Lab: ABNORMAL
Lab: NORMAL
M PNEUMO IGG SER IA-ACNC: NOT DETECTED
M PNEUMO IGG SER IA-ACNC: NOT DETECTED
M-SPIKE: 0.1 G/DL
MAGNESIUM SERPL-MCNC: 1.6 MG/DL (ref 1.6–2.4)
MAGNESIUM SERPL-MCNC: 1.8 MG/DL (ref 1.6–2.4)
MAGNESIUM SERPL-MCNC: 1.8 MG/DL (ref 1.6–2.4)
MAGNESIUM SERPL-MCNC: 1.9 MG/DL (ref 1.6–2.4)
MAGNESIUM SERPL-MCNC: 1.9 MG/DL (ref 1.6–2.4)
MAGNESIUM SERPL-MCNC: 2 MG/DL (ref 1.6–2.4)
MAGNESIUM SERPL-MCNC: 2.1 MG/DL (ref 1.6–2.4)
MAGNESIUM SERPL-MCNC: 2.2 MG/DL (ref 1.6–2.4)
MAGNESIUM SERPL-MCNC: 2.3 MG/DL (ref 1.6–2.4)
MAGNESIUM SERPL-MCNC: 2.4 MG/DL (ref 1.6–2.4)
MCH RBC QN AUTO: 27.6 PG (ref 26.6–33)
MCH RBC QN AUTO: 28 PG (ref 26.6–33)
MCH RBC QN AUTO: 28.4 PG (ref 26.6–33)
MCH RBC QN AUTO: 28.4 PG (ref 26.6–33)
MCH RBC QN AUTO: 28.5 PG (ref 26.6–33)
MCH RBC QN AUTO: 28.6 PG (ref 26.6–33)
MCH RBC QN AUTO: 29.2 PG (ref 26.6–33)
MCH RBC QN AUTO: 29.3 PG (ref 26.6–33)
MCH RBC QN AUTO: 29.4 PG (ref 26.6–33)
MCH RBC QN AUTO: 29.5 PG (ref 26.6–33)
MCH RBC QN AUTO: 29.9 PG (ref 26.6–33)
MCH RBC QN AUTO: 30.1 PG (ref 26.6–33)
MCH RBC QN AUTO: 30.1 PG (ref 26.6–33)
MCH RBC QN AUTO: 30.4 PG (ref 26.6–33)
MCH RBC QN AUTO: 30.7 PG (ref 26.6–33)
MCH RBC QN AUTO: 30.8 PG (ref 26.6–33)
MCH RBC QN AUTO: 30.9 PG (ref 26.6–33)
MCH RBC QN AUTO: 31 PG (ref 26.6–33)
MCH RBC QN AUTO: 31 PG (ref 26.6–33)
MCH RBC QN AUTO: 31.1 PG (ref 26.6–33)
MCH RBC QN AUTO: 31.1 PG (ref 26.6–33)
MCH RBC QN AUTO: 31.7 PG (ref 26.6–33)
MCH RBC QN AUTO: 32.1 PG (ref 26.6–33)
MCH RBC QN AUTO: 32.1 PG (ref 26.6–33)
MCH RBC QN AUTO: 32.9 PG (ref 26.6–33)
MCHC RBC AUTO-ENTMCNC: 32.2 G/DL (ref 31.5–35.7)
MCHC RBC AUTO-ENTMCNC: 32.5 G/DL (ref 31.5–35.7)
MCHC RBC AUTO-ENTMCNC: 32.6 G/DL (ref 31.5–35.7)
MCHC RBC AUTO-ENTMCNC: 33.1 G/DL (ref 31.5–35.7)
MCHC RBC AUTO-ENTMCNC: 33.2 G/DL (ref 31.5–35.7)
MCHC RBC AUTO-ENTMCNC: 33.3 G/DL (ref 31.5–35.7)
MCHC RBC AUTO-ENTMCNC: 33.4 G/DL (ref 31.5–35.7)
MCHC RBC AUTO-ENTMCNC: 33.5 G/DL (ref 31.5–35.7)
MCHC RBC AUTO-ENTMCNC: 33.6 G/DL (ref 31.5–35.7)
MCHC RBC AUTO-ENTMCNC: 33.7 G/DL (ref 31.5–35.7)
MCHC RBC AUTO-ENTMCNC: 33.8 G/DL (ref 31.5–35.7)
MCHC RBC AUTO-ENTMCNC: 33.9 G/DL (ref 31.5–35.7)
MCHC RBC AUTO-ENTMCNC: 34 G/DL (ref 31.5–35.7)
MCHC RBC AUTO-ENTMCNC: 34 G/DL (ref 31.5–35.7)
MCHC RBC AUTO-ENTMCNC: 34.1 G/DL (ref 31.5–35.7)
MCHC RBC AUTO-ENTMCNC: 34.1 G/DL (ref 31.5–35.7)
MCHC RBC AUTO-ENTMCNC: 34.3 G/DL (ref 31.5–35.7)
MCHC RBC AUTO-ENTMCNC: 34.3 G/DL (ref 31.5–35.7)
MCHC RBC AUTO-ENTMCNC: 34.4 G/DL (ref 31.5–35.7)
MCHC RBC AUTO-ENTMCNC: 34.9 G/DL (ref 31.5–35.7)
MCHC RBC AUTO-ENTMCNC: 35 G/DL (ref 31.5–35.7)
MCHC RBC AUTO-ENTMCNC: 35 G/DL (ref 31.5–35.7)
MCHC RBC AUTO-ENTMCNC: 36 G/DL (ref 31.5–35.7)
MCHC RBC AUTO-ENTMCNC: 36.1 G/DL (ref 31.5–35.7)
MCV RBC AUTO: 82.6 FL (ref 79–97)
MCV RBC AUTO: 83.5 FL (ref 79–97)
MCV RBC AUTO: 83.8 FL (ref 79–97)
MCV RBC AUTO: 84.3 FL (ref 79–97)
MCV RBC AUTO: 84.5 FL (ref 79–97)
MCV RBC AUTO: 84.7 FL (ref 79–97)
MCV RBC AUTO: 85.5 FL (ref 79–97)
MCV RBC AUTO: 86 FL (ref 79–97)
MCV RBC AUTO: 86.2 FL (ref 79–97)
MCV RBC AUTO: 86.4 FL (ref 79–97)
MCV RBC AUTO: 86.8 FL (ref 79–97)
MCV RBC AUTO: 87.4 FL (ref 79–97)
MCV RBC AUTO: 89 FL (ref 79–97)
MCV RBC AUTO: 90.9 FL (ref 79–97)
MCV RBC AUTO: 91.4 FL (ref 79–97)
MCV RBC AUTO: 91.7 FL (ref 79–97)
MCV RBC AUTO: 92.4 FL (ref 79–97)
MCV RBC AUTO: 92.9 FL (ref 79–97)
MCV RBC AUTO: 93.1 FL (ref 79–97)
MCV RBC AUTO: 93.4 FL (ref 79–97)
MCV RBC AUTO: 93.5 FL (ref 79–97)
MCV RBC AUTO: 94.3 FL (ref 79–97)
MCV RBC AUTO: 94.5 FL (ref 79–97)
MCV RBC AUTO: 96.5 FL (ref 79–97)
MESOTHL CELL NFR FLD MANUAL: 36 %
METAMYELOCYTES NFR BLD MANUAL: 1 % (ref 0–0)
METAMYELOCYTES NFR BLD MANUAL: 2 % (ref 0–0)
METAMYELOCYTES NFR BLD MANUAL: 6 % (ref 0–0)
METHADONE UR QL SCN: NEGATIVE
MODALITY: ABNORMAL
MONOCYTES # BLD AUTO: 0.2 10*3/MM3 (ref 0.1–0.9)
MONOCYTES # BLD AUTO: 0.22 10*3/MM3 (ref 0.1–0.9)
MONOCYTES # BLD AUTO: 0.24 10*3/MM3 (ref 0.1–0.9)
MONOCYTES # BLD AUTO: 0.3 10*3/MM3 (ref 0.1–0.9)
MONOCYTES # BLD AUTO: 0.35 10*3/MM3 (ref 0.1–0.9)
MONOCYTES # BLD AUTO: 0.4 10*3/MM3 (ref 0.1–0.9)
MONOCYTES # BLD AUTO: 0.42 10*3/MM3 (ref 0.1–0.9)
MONOCYTES # BLD AUTO: 0.55 10*3/MM3 (ref 0.1–0.9)
MONOCYTES # BLD AUTO: 0.63 10*3/MM3 (ref 0.1–0.9)
MONOCYTES # BLD AUTO: 0.7 10*3/MM3 (ref 0.1–0.9)
MONOCYTES # BLD AUTO: 0.78 10*3/MM3 (ref 0.1–0.9)
MONOCYTES # BLD AUTO: 0.8 10*3/MM3 (ref 0.1–0.9)
MONOCYTES # BLD AUTO: 0.84 10*3/MM3 (ref 0.1–0.9)
MONOCYTES # BLD AUTO: 0.84 10*3/MM3 (ref 0.1–0.9)
MONOCYTES # BLD AUTO: 0.86 10*3/MM3 (ref 0.1–0.9)
MONOCYTES # BLD AUTO: 0.89 10*3/MM3 (ref 0.1–0.9)
MONOCYTES # BLD AUTO: 0.9 10*3/MM3 (ref 0.1–0.9)
MONOCYTES # BLD AUTO: 0.9 10*3/MM3 (ref 0.1–0.9)
MONOCYTES # BLD AUTO: 1 10*3/MM3 (ref 0.1–0.9)
MONOCYTES # BLD AUTO: 1 10*3/MM3 (ref 0.1–0.9)
MONOCYTES # BLD AUTO: 1.1 10*3/MM3 (ref 0.1–0.9)
MONOCYTES # BLD AUTO: 1.1 10*3/MM3 (ref 0.1–0.9)
MONOCYTES # BLD AUTO: 1.13 10*3/MM3 (ref 0.1–0.9)
MONOCYTES # BLD AUTO: 1.3 10*3/MM3 (ref 0.1–0.9)
MONOCYTES # BLD AUTO: 1.32 10*3/MM3 (ref 0.1–0.9)
MONOCYTES # BLD AUTO: 1.34 10*3/MM3 (ref 0.1–0.9)
MONOCYTES NFR BLD AUTO: 12.7 % (ref 5–12)
MONOCYTES NFR BLD AUTO: 2.3 % (ref 5–12)
MONOCYTES NFR BLD AUTO: 2.7 % (ref 5–12)
MONOCYTES NFR BLD AUTO: 3.6 % (ref 5–12)
MONOCYTES NFR BLD AUTO: 5.5 % (ref 5–12)
MONOCYTES NFR BLD AUTO: 6.1 % (ref 5–12)
MONOCYTES NFR BLD AUTO: 7.3 % (ref 5–12)
MONOCYTES NFR BLD AUTO: 7.5 % (ref 5–12)
MONOCYTES NFR BLD AUTO: 7.5 % (ref 5–12)
MONOCYTES NFR BLD AUTO: 7.7 % (ref 5–12)
MONOCYTES NFR BLD AUTO: 7.7 % (ref 5–12)
MONOCYTES NFR BLD AUTO: 8.5 % (ref 5–12)
MONOCYTES NFR BLD AUTO: 8.8 % (ref 5–12)
MONOCYTES NFR BLD AUTO: 9.3 % (ref 5–12)
MONOCYTES NFR BLD AUTO: 9.8 % (ref 5–12)
MONOCYTES NFR FLD: 1 %
MRSA DNA SPEC QL NAA+PROBE: NORMAL
MRSA DNA SPEC QL NAA+PROBE: NORMAL
MYELOCYTES NFR BLD MANUAL: 1 % (ref 0–0)
MYELOCYTES NFR BLD MANUAL: 1 % (ref 0–0)
NEUTROPHILS # BLD AUTO: 10 10*3/MM3 (ref 1.7–7)
NEUTROPHILS # BLD AUTO: 10.3 10*3/MM3 (ref 1.7–7)
NEUTROPHILS # BLD AUTO: 10.47 10*3/MM3 (ref 1.7–7)
NEUTROPHILS # BLD AUTO: 11.93 10*3/MM3 (ref 1.7–7)
NEUTROPHILS # BLD AUTO: 12.55 10*3/MM3 (ref 1.7–7)
NEUTROPHILS # BLD AUTO: 12.58 10*3/MM3 (ref 1.7–7)
NEUTROPHILS # BLD AUTO: 13.21 10*3/MM3 (ref 1.7–7)
NEUTROPHILS # BLD AUTO: 14.6 10*3/MM3 (ref 1.7–7)
NEUTROPHILS # BLD AUTO: 17.54 10*3/MM3 (ref 1.7–7)
NEUTROPHILS # BLD AUTO: 17.66 10*3/MM3 (ref 1.7–7)
NEUTROPHILS # BLD AUTO: 18.6 10*3/MM3 (ref 1.7–7)
NEUTROPHILS # BLD AUTO: 19.8 10*3/MM3 (ref 1.7–7)
NEUTROPHILS # BLD AUTO: 19.98 10*3/MM3 (ref 1.7–7)
NEUTROPHILS # BLD AUTO: 20.43 10*3/MM3 (ref 1.7–7)
NEUTROPHILS # BLD AUTO: 21.19 10*3/MM3 (ref 1.7–7)
NEUTROPHILS # BLD AUTO: 24.12 10*3/MM3 (ref 1.7–7)
NEUTROPHILS # BLD AUTO: 5.5 10*3/MM3 (ref 1.7–7)
NEUTROPHILS # BLD AUTO: 6.6 10*3/MM3 (ref 1.7–7)
NEUTROPHILS # BLD AUTO: 8.59 10*3/MM3 (ref 1.7–7)
NEUTROPHILS # BLD AUTO: 8.6 10*3/MM3 (ref 1.7–7)
NEUTROPHILS # BLD AUTO: 9.2 10*3/MM3 (ref 1.7–7)
NEUTROPHILS # BLD AUTO: 9.4 10*3/MM3 (ref 1.7–7)
NEUTROPHILS # BLD AUTO: 9.44 10*3/MM3 (ref 1.7–7)
NEUTROPHILS # BLD AUTO: 9.44 10*3/MM3 (ref 1.7–7)
NEUTROPHILS # BLD AUTO: 9.7 10*3/MM3 (ref 1.7–7)
NEUTROPHILS # BLD AUTO: 9.72 10*3/MM3 (ref 1.7–7)
NEUTROPHILS # BLD AUTO: 9.9 10*3/MM3 (ref 1.7–7)
NEUTROPHILS NFR BLD AUTO: 70.5 % (ref 42.7–76)
NEUTROPHILS NFR BLD AUTO: 77.7 % (ref 42.7–76)
NEUTROPHILS NFR BLD AUTO: 81.3 % (ref 42.7–76)
NEUTROPHILS NFR BLD AUTO: 81.4 % (ref 42.7–76)
NEUTROPHILS NFR BLD AUTO: 81.4 % (ref 42.7–76)
NEUTROPHILS NFR BLD AUTO: 82.8 % (ref 42.7–76)
NEUTROPHILS NFR BLD AUTO: 83.1 % (ref 42.7–76)
NEUTROPHILS NFR BLD AUTO: 83.7 % (ref 42.7–76)
NEUTROPHILS NFR BLD AUTO: 83.7 % (ref 42.7–76)
NEUTROPHILS NFR BLD AUTO: 84 % (ref 42.7–76)
NEUTROPHILS NFR BLD AUTO: 84.3 % (ref 42.7–76)
NEUTROPHILS NFR BLD AUTO: 85.8 % (ref 42.7–76)
NEUTROPHILS NFR BLD AUTO: 89.4 % (ref 42.7–76)
NEUTROPHILS NFR BLD AUTO: 89.6 % (ref 42.7–76)
NEUTROPHILS NFR BLD AUTO: 92.3 % (ref 42.7–76)
NEUTROPHILS NFR BLD MANUAL: 70 % (ref 42.7–76)
NEUTROPHILS NFR BLD MANUAL: 72 % (ref 42.7–76)
NEUTROPHILS NFR BLD MANUAL: 74 % (ref 42.7–76)
NEUTROPHILS NFR BLD MANUAL: 75 % (ref 42.7–76)
NEUTROPHILS NFR BLD MANUAL: 76 % (ref 42.7–76)
NEUTROPHILS NFR BLD MANUAL: 78 % (ref 42.7–76)
NEUTROPHILS NFR BLD MANUAL: 79 % (ref 42.7–76)
NEUTROPHILS NFR BLD MANUAL: 79 % (ref 42.7–76)
NEUTROPHILS NFR BLD MANUAL: 80 % (ref 42.7–76)
NEUTROPHILS NFR BLD MANUAL: 86 % (ref 42.7–76)
NEUTROPHILS NFR BLD MANUAL: 86 % (ref 42.7–76)
NEUTROPHILS NFR BLD MANUAL: 91 % (ref 42.7–76)
NEUTROPHILS NFR FLD MANUAL: 61 %
NEUTS BAND NFR BLD MANUAL: 10 % (ref 0–5)
NEUTS BAND NFR BLD MANUAL: 13 % (ref 0–5)
NEUTS BAND NFR BLD MANUAL: 16 % (ref 0–5)
NEUTS BAND NFR BLD MANUAL: 16 % (ref 0–5)
NEUTS BAND NFR BLD MANUAL: 17 % (ref 0–5)
NEUTS BAND NFR BLD MANUAL: 19 % (ref 0–5)
NEUTS BAND NFR BLD MANUAL: 19 % (ref 0–5)
NEUTS BAND NFR BLD MANUAL: 4 % (ref 0–5)
NEUTS BAND NFR BLD MANUAL: 5 % (ref 0–5)
NEUTS BAND NFR BLD MANUAL: 5 % (ref 0–5)
NEUTS BAND NFR BLD MANUAL: 9 % (ref 0–5)
NEUTS BAND NFR BLD MANUAL: 9 % (ref 0–5)
NEUTS VAC BLD QL SMEAR: ABNORMAL
NEUTS VAC BLD QL SMEAR: ABNORMAL
NITRITE UR QL STRIP: NEGATIVE
NITRITE UR QL STRIP: POSITIVE
NITRITE UR QL STRIP: POSITIVE
NOROVIRUS GI+II RNA STL QL NAA+NON-PROBE: NOT DETECTED
NRBC BLD AUTO-RTO: 0 /100 WBC (ref 0–0.2)
NRBC BLD AUTO-RTO: 0.1 /100 WBC (ref 0–0.2)
NRBC BLD AUTO-RTO: 0.2 /100 WBC (ref 0–0.2)
NRBC SPEC MANUAL: 1 /100 WBC (ref 0–0.2)
NRBC SPEC MANUAL: 2 /100 WBC (ref 0–0.2)
NRBC SPEC MANUAL: 2 /100 WBC (ref 0–0.2)
NRBC SPEC MANUAL: 3 /100 WBC (ref 0–0.2)
NRBC SPEC MANUAL: 5 /100 WBC (ref 0–0.2)
NT-PROBNP SERPL-MCNC: 9464 PG/ML (ref 5–900)
NT-PROBNP SERPL-MCNC: ABNORMAL PG/ML (ref 5–900)
NT-PROBNP SERPL-MCNC: ABNORMAL PG/ML (ref 5–900)
NUC CELL # FLD: ABNORMAL 10*3/UL
OPIATES UR QL: NEGATIVE
OVALOCYTES BLD QL SMEAR: ABNORMAL
OVALOCYTES BLD QL SMEAR: NORMAL
OXYCODONE UR QL SCN: POSITIVE
P SHIGELLOIDES DNA STL QL NAA+PROBE: NOT DETECTED
PATH REPORT.ADDENDUM SPEC: NORMAL
PATH REPORT.FINAL DX SPEC: NORMAL
PATH REPORT.FINAL DX SPEC: NORMAL
PATH REPORT.GROSS SPEC: NORMAL
PATH REPORT.GROSS SPEC: NORMAL
PCO2 BLDA: 21.8 MM HG (ref 35–48)
PCO2 BLDA: 24 MM HG (ref 35–48)
PCO2 BLDA: 28.7 MM HG (ref 35–48)
PCO2 BLDA: 28.9 MM HG (ref 35–48)
PCO2 BLDA: 34.1 MM HG (ref 35–48)
PCO2 BLDA: 42 MM HG (ref 35–48)
PCO2 BLDA: 42.9 MM HG (ref 35–48)
PCO2 BLDA: 46.4 MM HG (ref 35–48)
PEEP RESPIRATORY: 5 CM[H2O]
PH BLDA: 7.24 PH UNITS (ref 7.35–7.45)
PH BLDA: 7.27 PH UNITS (ref 7.35–7.45)
PH BLDA: 7.32 PH UNITS (ref 7.35–7.45)
PH BLDA: 7.4 PH UNITS (ref 7.35–7.45)
PH BLDA: 7.43 PH UNITS (ref 7.35–7.45)
PH BLDA: 7.47 PH UNITS (ref 7.35–7.45)
PH BLDA: 7.48 PH UNITS (ref 7.35–7.45)
PH BLDA: 7.53 PH UNITS (ref 7.35–7.45)
PH UR STRIP.AUTO: 5.5 [PH] (ref 5–8)
PH UR STRIP.AUTO: 6 [PH] (ref 5–8)
PH UR STRIP.AUTO: 6 [PH] (ref 5–8)
PH UR STRIP.AUTO: <=5 [PH] (ref 5–8)
PHOSPHATE SERPL-MCNC: 2.6 MG/DL (ref 2.5–4.5)
PHOSPHATE SERPL-MCNC: 2.6 MG/DL (ref 2.5–4.5)
PHOSPHATE SERPL-MCNC: 2.9 MG/DL (ref 2.5–4.5)
PHOSPHATE SERPL-MCNC: 3.2 MG/DL (ref 2.5–4.5)
PHOSPHATE SERPL-MCNC: 4.1 MG/DL (ref 2.5–4.5)
PHOSPHATE SERPL-MCNC: 4.9 MG/DL (ref 2.5–4.5)
PHOSPHATE SERPL-MCNC: 5 MG/DL (ref 2.5–4.5)
PHOSPHATE SERPL-MCNC: 5.7 MG/DL (ref 2.5–4.5)
PHOSPHATE SERPL-MCNC: 6 MG/DL (ref 2.5–4.5)
PHOSPHATE SERPL-MCNC: 6.4 MG/DL (ref 2.5–4.5)
PHOSPHATE SERPL-MCNC: 7.1 MG/DL (ref 2.5–4.5)
PHOSPHATE SERPL-MCNC: 7.7 MG/DL (ref 2.5–4.5)
PHOSPHATE SERPL-MCNC: 7.8 MG/DL (ref 2.5–4.5)
PHOSPHATE SERPL-MCNC: 8.6 MG/DL (ref 2.5–4.5)
PLAT MORPH BLD: NORMAL
PLATELET # BLD AUTO: 130 10*3/MM3 (ref 140–450)
PLATELET # BLD AUTO: 146 10*3/MM3 (ref 140–450)
PLATELET # BLD AUTO: 159 10*3/MM3 (ref 140–450)
PLATELET # BLD AUTO: 168 10*3/MM3 (ref 140–450)
PLATELET # BLD AUTO: 174 10*3/MM3 (ref 140–450)
PLATELET # BLD AUTO: 178 10*3/MM3 (ref 140–450)
PLATELET # BLD AUTO: 181 10*3/MM3 (ref 140–450)
PLATELET # BLD AUTO: 188 10*3/MM3 (ref 140–450)
PLATELET # BLD AUTO: 189 10*3/MM3 (ref 140–450)
PLATELET # BLD AUTO: 190 10*3/MM3 (ref 140–450)
PLATELET # BLD AUTO: 196 10*3/MM3 (ref 140–450)
PLATELET # BLD AUTO: 204 10*3/MM3 (ref 140–450)
PLATELET # BLD AUTO: 208 10*3/MM3 (ref 140–450)
PLATELET # BLD AUTO: 213 10*3/MM3 (ref 140–450)
PLATELET # BLD AUTO: 218 10*3/MM3 (ref 140–450)
PLATELET # BLD AUTO: 231 10*3/MM3 (ref 140–450)
PLATELET # BLD AUTO: 233 10*3/MM3 (ref 140–450)
PLATELET # BLD AUTO: 235 10*3/MM3 (ref 140–450)
PLATELET # BLD AUTO: 237 10*3/MM3 (ref 140–450)
PLATELET # BLD AUTO: 240 10*3/MM3 (ref 140–450)
PLATELET # BLD AUTO: 241 10*3/MM3 (ref 140–450)
PLATELET # BLD AUTO: 244 10*3/MM3 (ref 140–450)
PLATELET # BLD AUTO: 271 10*3/MM3 (ref 140–450)
PLATELET # BLD AUTO: 272 10*3/MM3 (ref 140–450)
PLATELET # BLD AUTO: 274 10*3/MM3 (ref 140–450)
PLATELET # BLD AUTO: 283 10*3/MM3 (ref 140–450)
PLATELET # BLD AUTO: 294 10*3/MM3 (ref 140–450)
PMV BLD AUTO: 11.1 FL (ref 6–12)
PMV BLD AUTO: 7.3 FL (ref 6–12)
PMV BLD AUTO: 7.4 FL (ref 6–12)
PMV BLD AUTO: 7.5 FL (ref 6–12)
PMV BLD AUTO: 7.5 FL (ref 6–12)
PMV BLD AUTO: 7.9 FL (ref 6–12)
PMV BLD AUTO: 8 FL (ref 6–12)
PMV BLD AUTO: 8.2 FL (ref 6–12)
PMV BLD AUTO: 8.2 FL (ref 6–12)
PMV BLD AUTO: 8.3 FL (ref 6–12)
PMV BLD AUTO: 8.7 FL (ref 6–12)
PMV BLD AUTO: 8.8 FL (ref 6–12)
PMV BLD AUTO: 8.9 FL (ref 6–12)
PMV BLD AUTO: 8.9 FL (ref 6–12)
PMV BLD AUTO: 9 FL (ref 6–12)
PMV BLD AUTO: 9.1 FL (ref 6–12)
PMV BLD AUTO: 9.1 FL (ref 6–12)
PMV BLD AUTO: 9.4 FL (ref 6–12)
PMV BLD AUTO: 9.5 FL (ref 6–12)
PMV BLD AUTO: 9.6 FL (ref 6–12)
PMV BLD AUTO: 9.6 FL (ref 6–12)
PO2 BLDA: 125.4 MM HG (ref 83–108)
PO2 BLDA: 138.1 MM HG (ref 83–108)
PO2 BLDA: 138.7 MM HG (ref 83–108)
PO2 BLDA: 145.5 MM HG (ref 83–108)
PO2 BLDA: 170.3 MM HG (ref 83–108)
PO2 BLDA: 73.3 MM HG (ref 83–108)
PO2 BLDA: 82.8 MM HG (ref 83–108)
PO2 BLDA: 94.2 MM HG (ref 83–108)
POLYCHROMASIA BLD QL SMEAR: ABNORMAL
POLYCHROMASIA BLD QL SMEAR: ABNORMAL
POTASSIUM BLD-SCNC: 3.3 MMOL/L (ref 3.5–5.2)
POTASSIUM BLD-SCNC: 3.5 MMOL/L (ref 3.5–5.2)
POTASSIUM BLD-SCNC: 3.7 MMOL/L (ref 3.5–5.2)
POTASSIUM BLD-SCNC: 3.8 MMOL/L (ref 3.5–5.2)
POTASSIUM BLD-SCNC: 3.8 MMOL/L (ref 3.5–5.2)
POTASSIUM BLD-SCNC: 3.9 MMOL/L (ref 3.5–5.2)
POTASSIUM BLD-SCNC: 4 MMOL/L (ref 3.5–5.2)
POTASSIUM BLD-SCNC: 4.1 MMOL/L (ref 3.5–5.2)
POTASSIUM BLD-SCNC: 4.2 MMOL/L (ref 3.5–5.2)
POTASSIUM BLD-SCNC: 4.2 MMOL/L (ref 3.5–5.2)
POTASSIUM BLD-SCNC: 4.3 MMOL/L (ref 3.5–5.2)
POTASSIUM BLD-SCNC: 4.4 MMOL/L (ref 3.5–5.2)
POTASSIUM BLD-SCNC: 4.4 MMOL/L (ref 3.5–5.2)
POTASSIUM BLD-SCNC: 4.7 MMOL/L (ref 3.5–5.2)
POTASSIUM BLD-SCNC: 4.9 MMOL/L (ref 3.5–5.2)
POTASSIUM BLD-SCNC: 5.1 MMOL/L (ref 3.5–5.2)
POTASSIUM BLDA-SCNC: 3.8 MMOL/L (ref 3.5–4.5)
POTASSIUM BLDA-SCNC: 4.2 MMOL/L (ref 3.5–4.5)
PROCALCITONIN SERPL-MCNC: 1.14 NG/ML (ref 0.1–0.25)
PROCALCITONIN SERPL-MCNC: 20.53 NG/ML (ref 0.1–0.25)
PROT SERPL-MCNC: 5.2 G/DL (ref 6–8.5)
PROT SERPL-MCNC: 5.3 G/DL (ref 6–8.5)
PROT SERPL-MCNC: 5.7 G/DL (ref 6–8.5)
PROT SERPL-MCNC: 5.8 G/DL (ref 6–8.5)
PROT SERPL-MCNC: 5.9 G/DL (ref 6–8.5)
PROT SERPL-MCNC: 5.9 G/DL (ref 6–8.5)
PROT SERPL-MCNC: 6.1 G/DL (ref 6–8.5)
PROT SERPL-MCNC: 6.2 G/DL (ref 6–8.5)
PROT SERPL-MCNC: 6.3 G/DL (ref 6–8.5)
PROT SERPL-MCNC: 6.3 G/DL (ref 6–8.5)
PROT SERPL-MCNC: 6.5 G/DL (ref 6–8.5)
PROT SERPL-MCNC: 6.6 G/DL (ref 6–8.5)
PROT SERPL-MCNC: 6.7 G/DL (ref 6–8.5)
PROT SERPL-MCNC: 7 G/DL (ref 6–8.5)
PROT UR QL STRIP: ABNORMAL
PROT UR QL STRIP: NEGATIVE
PROTHROMBIN TIME: 11 SECONDS (ref 9.6–11.7)
PROTHROMBIN TIME: 11.4 SECONDS (ref 9.6–11.7)
PROTHROMBIN TIME: 12 SECONDS (ref 9.6–11.7)
PROTHROMBIN TIME: 12.7 SECONDS (ref 9.6–11.7)
PROTHROMBIN TIME: 13.3 SECONDS (ref 9.6–11.7)
PROTHROMBIN TIME: 13.9 SECONDS (ref 9.6–11.7)
PROTHROMBIN TIME: 14.8 SECONDS (ref 9.6–11.7)
PROTHROMBIN TIME: 15.3 SECONDS (ref 9.6–11.7)
PSV: 12 CMH2O
RBC # BLD AUTO: 2.34 10*6/MM3 (ref 4.14–5.8)
RBC # BLD AUTO: 3.05 10*6/MM3 (ref 4.14–5.8)
RBC # BLD AUTO: 3.43 10*6/MM3 (ref 4.14–5.8)
RBC # BLD AUTO: 3.74 10*6/MM3 (ref 4.14–5.8)
RBC # BLD AUTO: 3.76 10*6/MM3 (ref 4.14–5.8)
RBC # BLD AUTO: 4.36 10*6/MM3 (ref 4.14–5.8)
RBC # BLD AUTO: 4.41 10*6/MM3 (ref 4.14–5.8)
RBC # BLD AUTO: 4.45 10*6/MM3 (ref 4.14–5.8)
RBC # BLD AUTO: 4.78 10*6/MM3 (ref 4.14–5.8)
RBC # BLD AUTO: 4.85 10*6/MM3 (ref 4.14–5.8)
RBC # BLD AUTO: 4.85 10*6/MM3 (ref 4.14–5.8)
RBC # BLD AUTO: 4.86 10*6/MM3 (ref 4.14–5.8)
RBC # BLD AUTO: 4.88 10*6/MM3 (ref 4.14–5.8)
RBC # BLD AUTO: 4.9 10*6/MM3 (ref 4.14–5.8)
RBC # BLD AUTO: 4.91 10*6/MM3 (ref 4.14–5.8)
RBC # BLD AUTO: 4.96 10*6/MM3 (ref 4.14–5.8)
RBC # BLD AUTO: 4.97 10*6/MM3 (ref 4.14–5.8)
RBC # BLD AUTO: 5.01 10*6/MM3 (ref 4.14–5.8)
RBC # BLD AUTO: 5.03 10*6/MM3 (ref 4.14–5.8)
RBC # BLD AUTO: 5.05 10*6/MM3 (ref 4.14–5.8)
RBC # BLD AUTO: 5.06 10*6/MM3 (ref 4.14–5.8)
RBC # BLD AUTO: 5.11 10*6/MM3 (ref 4.14–5.8)
RBC # BLD AUTO: 5.13 10*6/MM3 (ref 4.14–5.8)
RBC # BLD AUTO: 5.21 10*6/MM3 (ref 4.14–5.8)
RBC # BLD AUTO: 5.25 10*6/MM3 (ref 4.14–5.8)
RBC # BLD AUTO: 5.27 10*6/MM3 (ref 4.14–5.8)
RBC # BLD AUTO: 5.62 10*6/MM3 (ref 4.14–5.8)
RBC # UR: ABNORMAL /HPF
RBC MORPH BLD: NORMAL
REF LAB TEST METHOD: ABNORMAL
RESPIRATORY RATE: 12
RESPIRATORY RATE: 18
RETICS # AUTO: 0.1 10*6/MM3 (ref 0.02–0.13)
RETICS/RBC NFR AUTO: 2.55 % (ref 0.7–1.9)
RH BLD: POSITIVE
RH BLD: POSITIVE
RHINOVIRUS RNA SPEC NAA+PROBE: NOT DETECTED
RHINOVIRUS RNA SPEC NAA+PROBE: NOT DETECTED
RSV RNA NPH QL NAA+NON-PROBE: NOT DETECTED
RSV RNA NPH QL NAA+NON-PROBE: NOT DETECTED
RV RNA STL NAA+PROBE: NOT DETECTED
S PNEUM AG SPEC QL LA: NEGATIVE
SALMONELLA DNA SPEC QL NAA+PROBE: NOT DETECTED
SAO2 % BLDCOA: 91.5 % (ref 94–98)
SAO2 % BLDCOA: 96.2 % (ref 94–98)
SAO2 % BLDCOA: 97 % (ref 94–98)
SAO2 % BLDCOA: 98.5 % (ref 94–98)
SAO2 % BLDCOA: 99.2 % (ref 94–98)
SAO2 % BLDCOA: 99.5 % (ref 94–98)
SAO2 % BLDCOA: 99.5 % (ref 94–98)
SAO2 % BLDCOA: 99.6 % (ref 94–98)
SAPO I+II+IV+V RNA STL QL NAA+NON-PROBE: NOT DETECTED
SARS-COV-2 RNA RESP QL NAA+PROBE: NOT DETECTED
SCAN SLIDE: NORMAL
SHIGELLA SP+EIEC IPAH STL QL NAA+PROBE: NOT DETECTED
SODIUM BLD-SCNC: 134 MMOL/L (ref 136–145)
SODIUM BLD-SCNC: 135 MMOL/L (ref 136–145)
SODIUM BLD-SCNC: 136 MMOL/L (ref 136–145)
SODIUM BLD-SCNC: 138 MMOL/L (ref 136–145)
SODIUM BLD-SCNC: 139 MMOL/L (ref 136–145)
SODIUM BLD-SCNC: 140 MMOL/L (ref 136–145)
SODIUM BLD-SCNC: 140 MMOL/L (ref 136–145)
SODIUM BLD-SCNC: 142 MMOL/L (ref 136–145)
SODIUM BLD-SCNC: 143 MMOL/L (ref 136–145)
SODIUM BLD-SCNC: 143 MMOL/L (ref 136–145)
SODIUM BLD-SCNC: 145 MMOL/L (ref 136–145)
SODIUM BLD-SCNC: 146 MMOL/L (ref 136–145)
SODIUM BLD-SCNC: 147 MMOL/L (ref 136–145)
SODIUM BLD-SCNC: 150 MMOL/L (ref 136–145)
SODIUM BLD-SCNC: 150 MMOL/L (ref 136–145)
SODIUM BLD-SCNC: 151 MMOL/L (ref 136–145)
SODIUM BLD-SCNC: 153 MMOL/L (ref 136–145)
SODIUM BLD-SCNC: 153 MMOL/L (ref 136–145)
SODIUM BLDA-SCNC: 139 MMOL/L (ref 138–146)
SODIUM BLDA-SCNC: 148 MMOL/L (ref 138–146)
SODIUM UR-SCNC: <20 MMOL/L
SP GR UR STRIP: 1.01 (ref 1–1.03)
SP GR UR STRIP: 1.02 (ref 1–1.03)
SP GR UR STRIP: 1.03 (ref 1–1.03)
SP GR UR STRIP: 1.03 (ref 1–1.03)
SQUAMOUS #/AREA URNS HPF: ABNORMAL /HPF
T&S EXPIRATION DATE: NORMAL
T&S EXPIRATION DATE: NORMAL
TIBC SERPL-MCNC: 149 MCG/DL (ref 298–536)
TOTAL RATE: 39 BREATHS/MINUTE
TOXIC GRANULATION: ABNORMAL
TRANSFERRIN SERPL-MCNC: 100 MG/DL (ref 200–360)
TRIGL SERPL-MCNC: 55 MG/DL (ref 0–150)
TRIGL SERPL-MCNC: 71 MG/DL (ref 0–150)
TRIGL SERPL-MCNC: 84 MG/DL (ref 0–150)
TROPONIN T SERPL-MCNC: 0.01 NG/ML (ref 0–0.03)
TROPONIN T SERPL-MCNC: 0.06 NG/ML (ref 0–0.03)
TROPONIN T SERPL-MCNC: 0.08 NG/ML (ref 0–0.03)
TROPONIN T SERPL-MCNC: 0.08 NG/ML (ref 0–0.03)
TROPONIN T SERPL-MCNC: 0.09 NG/ML (ref 0–0.03)
TROPONIN T SERPL-MCNC: 0.11 NG/ML (ref 0–0.03)
TROPONIN T SERPL-MCNC: <0.01 NG/ML (ref 0–0.03)
TROPONIN T SERPL-MCNC: <0.01 NG/ML (ref 0–0.03)
TSH SERPL DL<=0.05 MIU/L-ACNC: 2.73 UIU/ML (ref 0.27–4.2)
UROBILINOGEN UR QL STRIP: ABNORMAL
UROBILINOGEN UR QL STRIP: NORMAL
UROBILINOGEN UR QL STRIP: NORMAL
V CHOLERAE DNA SPEC QL NAA+PROBE: NOT DETECTED
VARIANT LYMPHS NFR BLD MANUAL: 3 % (ref 0–5)
VARIANT LYMPHS NFR BLD MANUAL: 3 % (ref 0–5)
VENTILATOR MODE: ABNORMAL
VIBRIO DNA SPEC NAA+PROBE: NOT DETECTED
VIT B12 BLD-MCNC: >2000 PG/ML (ref 211–946)
VLDLC SERPL-MCNC: 11 MG/DL
VLDLC SERPL-MCNC: 14.2 MG/DL
VLDLC SERPL-MCNC: 16.8 MG/DL
VT ON VENT VENT: 500 ML
VT ON VENT VENT: 600 ML
VT ON VENT VENT: 600 ML
WBC MORPH BLD: NORMAL
WBC NRBC COR # BLD: 10.1 10*3/MM3 (ref 3.4–10.8)
WBC NRBC COR # BLD: 10.2 10*3/MM3 (ref 3.4–10.8)
WBC NRBC COR # BLD: 11 10*3/MM3 (ref 3.4–10.8)
WBC NRBC COR # BLD: 11.41 10*3/MM3 (ref 3.4–10.8)
WBC NRBC COR # BLD: 11.5 10*3/MM3 (ref 3.4–10.8)
WBC NRBC COR # BLD: 11.5 10*3/MM3 (ref 3.4–10.8)
WBC NRBC COR # BLD: 11.57 10*3/MM3 (ref 3.4–10.8)
WBC NRBC COR # BLD: 11.8 10*3/MM3 (ref 3.4–10.8)
WBC NRBC COR # BLD: 11.9 10*3/MM3 (ref 3.4–10.8)
WBC NRBC COR # BLD: 11.9 10*3/MM3 (ref 3.4–10.8)
WBC NRBC COR # BLD: 12 10*3/MM3 (ref 3.4–10.8)
WBC NRBC COR # BLD: 12 10*3/MM3 (ref 3.4–10.8)
WBC NRBC COR # BLD: 13.9 10*3/MM3 (ref 3.4–10.8)
WBC NRBC COR # BLD: 14.1 10*3/MM3 (ref 3.4–10.8)
WBC NRBC COR # BLD: 14.16 10*3/MM3 (ref 3.4–10.8)
WBC NRBC COR # BLD: 15.02 10*3/MM3 (ref 3.4–10.8)
WBC NRBC COR # BLD: 16.3 10*3/MM3 (ref 3.4–10.8)
WBC NRBC COR # BLD: 17.9 10*3/MM3 (ref 3.4–10.8)
WBC NRBC COR # BLD: 18.4 10*3/MM3 (ref 3.4–10.8)
WBC NRBC COR # BLD: 20.9 10*3/MM3 (ref 3.4–10.8)
WBC NRBC COR # BLD: 21.5 10*3/MM3 (ref 3.4–10.8)
WBC NRBC COR # BLD: 21.5 10*3/MM3 (ref 3.4–10.8)
WBC NRBC COR # BLD: 22.3 10*3/MM3 (ref 3.4–10.8)
WBC NRBC COR # BLD: 23.5 10*3/MM3 (ref 3.4–10.8)
WBC NRBC COR # BLD: 26.8 10*3/MM3 (ref 3.4–10.8)
WBC NRBC COR # BLD: 7.8 10*3/MM3 (ref 3.4–10.8)
WBC NRBC COR # BLD: 8.13 10*3/MM3 (ref 3.4–10.8)
WBC UR QL AUTO: ABNORMAL /HPF
WHOLE BLOOD HOLD SPECIMEN: NORMAL
YEAST URNS QL MICRO: ABNORMAL /HPF
YERSINIA STL CULT: NOT DETECTED

## 2020-01-01 PROCEDURE — 83880 ASSAY OF NATRIURETIC PEPTIDE: CPT | Performed by: EMERGENCY MEDICINE

## 2020-01-01 PROCEDURE — 25010000002 HEPARIN (PORCINE) 1000-0.9 UT/500ML-% SOLUTION: Performed by: ANESTHESIOLOGY

## 2020-01-01 PROCEDURE — 80162 ASSAY OF DIGOXIN TOTAL: CPT | Performed by: NURSE PRACTITIONER

## 2020-01-01 PROCEDURE — 82550 ASSAY OF CK (CPK): CPT | Performed by: NURSE PRACTITIONER

## 2020-01-01 PROCEDURE — 99221 1ST HOSP IP/OBS SF/LOW 40: CPT | Performed by: PSYCHIATRY & NEUROLOGY

## 2020-01-01 PROCEDURE — 25010000002 HYDROMORPHONE PER 4 MG: Performed by: NEUROLOGICAL SURGERY

## 2020-01-01 PROCEDURE — 5A1945Z RESPIRATORY VENTILATION, 24-96 CONSECUTIVE HOURS: ICD-10-PCS | Performed by: INTERNAL MEDICINE

## 2020-01-01 PROCEDURE — 85007 BL SMEAR W/DIFF WBC COUNT: CPT | Performed by: NURSE PRACTITIONER

## 2020-01-01 PROCEDURE — 85014 HEMATOCRIT: CPT | Performed by: RADIOLOGY

## 2020-01-01 PROCEDURE — 25010000002 MORPHINE PER 10 MG: Performed by: NURSE PRACTITIONER

## 2020-01-01 PROCEDURE — 83735 ASSAY OF MAGNESIUM: CPT | Performed by: NURSE PRACTITIONER

## 2020-01-01 PROCEDURE — 80053 COMPREHEN METABOLIC PANEL: CPT | Performed by: NURSE PRACTITIONER

## 2020-01-01 PROCEDURE — 25010000002 DEXAMETHASONE PER 1 MG: Performed by: NURSE PRACTITIONER

## 2020-01-01 PROCEDURE — 99222 1ST HOSP IP/OBS MODERATE 55: CPT | Performed by: INTERNAL MEDICINE

## 2020-01-01 PROCEDURE — 80048 BASIC METABOLIC PNL TOTAL CA: CPT | Performed by: NURSE PRACTITIONER

## 2020-01-01 PROCEDURE — 99239 HOSP IP/OBS DSCHRG MGMT >30: CPT | Performed by: HOSPITALIST

## 2020-01-01 PROCEDURE — 83880 ASSAY OF NATRIURETIC PEPTIDE: CPT | Performed by: NURSE PRACTITIONER

## 2020-01-01 PROCEDURE — 71045 X-RAY EXAM CHEST 1 VIEW: CPT

## 2020-01-01 PROCEDURE — 83615 LACTATE (LD) (LDH) ENZYME: CPT | Performed by: NURSE PRACTITIONER

## 2020-01-01 PROCEDURE — 94660 CPAP INITIATION&MGMT: CPT

## 2020-01-01 PROCEDURE — 80061 LIPID PANEL: CPT | Performed by: NURSE PRACTITIONER

## 2020-01-01 PROCEDURE — 85025 COMPLETE CBC W/AUTO DIFF WBC: CPT | Performed by: NURSE PRACTITIONER

## 2020-01-01 PROCEDURE — 25010000002 LEVETIRACETAM IN NACL 0.82% 500 MG/100ML SOLUTION: Performed by: NURSE PRACTITIONER

## 2020-01-01 PROCEDURE — 82803 BLOOD GASES ANY COMBINATION: CPT

## 2020-01-01 PROCEDURE — 84100 ASSAY OF PHOSPHORUS: CPT | Performed by: NURSE PRACTITIONER

## 2020-01-01 PROCEDURE — 94003 VENT MGMT INPAT SUBQ DAY: CPT

## 2020-01-01 PROCEDURE — 80048 BASIC METABOLIC PNL TOTAL CA: CPT | Performed by: INTERNAL MEDICINE

## 2020-01-01 PROCEDURE — 63710000001 INSULIN LISPRO (HUMAN) PER 5 UNITS: Performed by: NURSE PRACTITIONER

## 2020-01-01 PROCEDURE — 83605 ASSAY OF LACTIC ACID: CPT | Performed by: NURSE PRACTITIONER

## 2020-01-01 PROCEDURE — 81001 URINALYSIS AUTO W/SCOPE: CPT | Performed by: EMERGENCY MEDICINE

## 2020-01-01 PROCEDURE — 80053 COMPREHEN METABOLIC PANEL: CPT | Performed by: INTERNAL MEDICINE

## 2020-01-01 PROCEDURE — 85018 HEMOGLOBIN: CPT | Performed by: RADIOLOGY

## 2020-01-01 PROCEDURE — 25010000002 PIPERACILLIN SOD-TAZOBACTAM PER 1 G: Performed by: NURSE PRACTITIONER

## 2020-01-01 PROCEDURE — 87899 AGENT NOS ASSAY W/OPTIC: CPT | Performed by: NURSE PRACTITIONER

## 2020-01-01 PROCEDURE — 85025 COMPLETE CBC W/AUTO DIFF WBC: CPT | Performed by: HOSPITALIST

## 2020-01-01 PROCEDURE — 87070 CULTURE OTHR SPECIMN AEROBIC: CPT | Performed by: INTERNAL MEDICINE

## 2020-01-01 PROCEDURE — 95822 EEG COMA OR SLEEP ONLY: CPT

## 2020-01-01 PROCEDURE — 25010000002 KETOROLAC TROMETHAMINE PER 15 MG: Performed by: PHYSICIAN ASSISTANT

## 2020-01-01 PROCEDURE — 99232 SBSQ HOSP IP/OBS MODERATE 35: CPT | Performed by: INTERNAL MEDICINE

## 2020-01-01 PROCEDURE — 96413 CHEMO IV INFUSION 1 HR: CPT

## 2020-01-01 PROCEDURE — 70450 CT HEAD/BRAIN W/O DYE: CPT

## 2020-01-01 PROCEDURE — 36600 WITHDRAWAL OF ARTERIAL BLOOD: CPT

## 2020-01-01 PROCEDURE — 86900 BLOOD TYPING SEROLOGIC ABO: CPT | Performed by: PHYSICIAN ASSISTANT

## 2020-01-01 PROCEDURE — 71250 CT THORAX DX C-: CPT

## 2020-01-01 PROCEDURE — 87641 MR-STAPH DNA AMP PROBE: CPT | Performed by: INTERNAL MEDICINE

## 2020-01-01 PROCEDURE — 25010000002 DIGOXIN PER 500 MCG: Performed by: INTERNAL MEDICINE

## 2020-01-01 PROCEDURE — 25010000002 HEPARIN (PORCINE) PER 1000 UNITS: Performed by: INTERNAL MEDICINE

## 2020-01-01 PROCEDURE — 93005 ELECTROCARDIOGRAM TRACING: CPT | Performed by: NURSE PRACTITIONER

## 2020-01-01 PROCEDURE — 86140 C-REACTIVE PROTEIN: CPT | Performed by: NURSE PRACTITIONER

## 2020-01-01 PROCEDURE — 0JH604Z INSERTION OF PACEMAKER, SINGLE CHAMBER INTO CHEST SUBCUTANEOUS TISSUE AND FASCIA, OPEN APPROACH: ICD-10-PCS | Performed by: INTERNAL MEDICINE

## 2020-01-01 PROCEDURE — 25010000002 DEXAMETHASONE SODIUM PHOSPHATE 10 MG/ML SOLUTION: Performed by: EMERGENCY MEDICINE

## 2020-01-01 PROCEDURE — 25010000002 PHENYLEPHRINE 10 MG/ML SOLUTION: Performed by: NURSE PRACTITIONER

## 2020-01-01 PROCEDURE — 87641 MR-STAPH DNA AMP PROBE: CPT | Performed by: NURSE PRACTITIONER

## 2020-01-01 PROCEDURE — 25010000002 ENOXAPARIN PER 10 MG: Performed by: NURSE PRACTITIONER

## 2020-01-01 PROCEDURE — 85384 FIBRINOGEN ACTIVITY: CPT | Performed by: NURSE PRACTITIONER

## 2020-01-01 PROCEDURE — 94799 UNLISTED PULMONARY SVC/PX: CPT

## 2020-01-01 PROCEDURE — 82728 ASSAY OF FERRITIN: CPT | Performed by: NURSE PRACTITIONER

## 2020-01-01 PROCEDURE — C1713 ANCHOR/SCREW BN/BN,TIS/BN: HCPCS | Performed by: NEUROLOGICAL SURGERY

## 2020-01-01 PROCEDURE — 82330 ASSAY OF CALCIUM: CPT | Performed by: NURSE PRACTITIONER

## 2020-01-01 PROCEDURE — 85018 HEMOGLOBIN: CPT

## 2020-01-01 PROCEDURE — 25010000002 MIDAZOLAM PER 1 MG

## 2020-01-01 PROCEDURE — 80051 ELECTROLYTE PANEL: CPT

## 2020-01-01 PROCEDURE — 99233 SBSQ HOSP IP/OBS HIGH 50: CPT | Performed by: INTERNAL MEDICINE

## 2020-01-01 PROCEDURE — 85025 COMPLETE CBC W/AUTO DIFF WBC: CPT | Performed by: INTERNAL MEDICINE

## 2020-01-01 PROCEDURE — 25010000002 ONDANSETRON PER 1 MG: Performed by: NEUROLOGICAL SURGERY

## 2020-01-01 PROCEDURE — 84300 ASSAY OF URINE SODIUM: CPT | Performed by: INTERNAL MEDICINE

## 2020-01-01 PROCEDURE — 81001 URINALYSIS AUTO W/SCOPE: CPT | Performed by: INTERNAL MEDICINE

## 2020-01-01 PROCEDURE — 82962 GLUCOSE BLOOD TEST: CPT

## 2020-01-01 PROCEDURE — G0463 HOSPITAL OUTPT CLINIC VISIT: HCPCS | Performed by: RADIOLOGY

## 2020-01-01 PROCEDURE — 33207 INSERT HEART PM VENTRICULAR: CPT | Performed by: INTERNAL MEDICINE

## 2020-01-01 PROCEDURE — 85025 COMPLETE CBC W/AUTO DIFF WBC: CPT | Performed by: EMERGENCY MEDICINE

## 2020-01-01 PROCEDURE — 84165 PROTEIN E-PHORESIS SERUM: CPT | Performed by: NURSE PRACTITIONER

## 2020-01-01 PROCEDURE — 0B9D8ZX DRAINAGE OF RIGHT MIDDLE LUNG LOBE, VIA NATURAL OR ARTIFICIAL OPENING ENDOSCOPIC, DIAGNOSTIC: ICD-10-PCS | Performed by: INTERNAL MEDICINE

## 2020-01-01 PROCEDURE — 76775 US EXAM ABDO BACK WALL LIM: CPT

## 2020-01-01 PROCEDURE — 99232 SBSQ HOSP IP/OBS MODERATE 35: CPT | Performed by: SURGERY

## 2020-01-01 PROCEDURE — 85730 THROMBOPLASTIN TIME PARTIAL: CPT | Performed by: INTERNAL MEDICINE

## 2020-01-01 PROCEDURE — 84484 ASSAY OF TROPONIN QUANT: CPT | Performed by: EMERGENCY MEDICINE

## 2020-01-01 PROCEDURE — 81287 MGMT GENE PRMTR MTHYLTN ALYS: CPT

## 2020-01-01 PROCEDURE — 25010000002 CEFAZOLIN PER 500 MG: Performed by: NEUROLOGICAL SURGERY

## 2020-01-01 PROCEDURE — 93010 ELECTROCARDIOGRAM REPORT: CPT | Performed by: INTERNAL MEDICINE

## 2020-01-01 PROCEDURE — 99152 MOD SED SAME PHYS/QHP 5/>YRS: CPT | Performed by: INTERNAL MEDICINE

## 2020-01-01 PROCEDURE — 83540 ASSAY OF IRON: CPT | Performed by: NURSE PRACTITIONER

## 2020-01-01 PROCEDURE — 93005 ELECTROCARDIOGRAM TRACING: CPT | Performed by: HOSPITALIST

## 2020-01-01 PROCEDURE — 85610 PROTHROMBIN TIME: CPT | Performed by: NURSE PRACTITIONER

## 2020-01-01 PROCEDURE — 25010000002 PIPERACILLIN SOD-TAZOBACTAM PER 1 G: Performed by: INTERNAL MEDICINE

## 2020-01-01 PROCEDURE — 25010000002 GADOTERIDOL PER 1 ML: Performed by: NEUROLOGICAL SURGERY

## 2020-01-01 PROCEDURE — 82565 ASSAY OF CREATININE: CPT | Performed by: NURSE PRACTITIONER

## 2020-01-01 PROCEDURE — 85379 FIBRIN DEGRADATION QUANT: CPT | Performed by: NURSE PRACTITIONER

## 2020-01-01 PROCEDURE — 85025 COMPLETE CBC W/AUTO DIFF WBC: CPT | Performed by: NEUROLOGICAL SURGERY

## 2020-01-01 PROCEDURE — 25010000002 VANCOMYCIN 1 G RECONSTITUTED SOLUTION 1 EACH VIAL: Performed by: INTERNAL MEDICINE

## 2020-01-01 PROCEDURE — 97162 PT EVAL MOD COMPLEX 30 MIN: CPT

## 2020-01-01 PROCEDURE — 84145 PROCALCITONIN (PCT): CPT | Performed by: NURSE PRACTITIONER

## 2020-01-01 PROCEDURE — 80048 BASIC METABOLIC PNL TOTAL CA: CPT | Performed by: NEUROLOGICAL SURGERY

## 2020-01-01 PROCEDURE — 3E0G76Z INTRODUCTION OF NUTRITIONAL SUBSTANCE INTO UPPER GI, VIA NATURAL OR ARTIFICIAL OPENING: ICD-10-PCS | Performed by: INTERNAL MEDICINE

## 2020-01-01 PROCEDURE — 25010000002 CHLOROTHIAZIDE PER 500 MG: Performed by: INTERNAL MEDICINE

## 2020-01-01 PROCEDURE — 88108 CYTOPATH CONCENTRATE TECH: CPT | Performed by: INTERNAL MEDICINE

## 2020-01-01 PROCEDURE — 81003 URINALYSIS AUTO W/O SCOPE: CPT | Performed by: NURSE PRACTITIONER

## 2020-01-01 PROCEDURE — 87040 BLOOD CULTURE FOR BACTERIA: CPT | Performed by: EMERGENCY MEDICINE

## 2020-01-01 PROCEDURE — 25010000002 DIGOXIN PER 500 MCG: Performed by: NURSE PRACTITIONER

## 2020-01-01 PROCEDURE — 25010000003 LEVETIRACETAM IN NACL 0.75% 1000 MG/100ML SOLUTION: Performed by: EMERGENCY MEDICINE

## 2020-01-01 PROCEDURE — 99215 OFFICE O/P EST HI 40 MIN: CPT | Performed by: INTERNAL MEDICINE

## 2020-01-01 PROCEDURE — 87635 SARS-COV-2 COVID-19 AMP PRB: CPT | Performed by: EMERGENCY MEDICINE

## 2020-01-01 PROCEDURE — 25010000002 MIDAZOLAM PER 1 MG: Performed by: NURSE PRACTITIONER

## 2020-01-01 PROCEDURE — C1786 PMKR, SINGLE, RATE-RESP: HCPCS | Performed by: INTERNAL MEDICINE

## 2020-01-01 PROCEDURE — 25010000002 AMIODARONE PER 30 MG: Performed by: NURSE PRACTITIONER

## 2020-01-01 PROCEDURE — 82330 ASSAY OF CALCIUM: CPT

## 2020-01-01 PROCEDURE — 85730 THROMBOPLASTIN TIME PARTIAL: CPT | Performed by: EMERGENCY MEDICINE

## 2020-01-01 PROCEDURE — C1769 GUIDE WIRE: HCPCS

## 2020-01-01 PROCEDURE — 85730 THROMBOPLASTIN TIME PARTIAL: CPT | Performed by: NURSE PRACTITIONER

## 2020-01-01 PROCEDURE — 99285 EMERGENCY DEPT VISIT HI MDM: CPT

## 2020-01-01 PROCEDURE — 99222 1ST HOSP IP/OBS MODERATE 55: CPT | Performed by: FAMILY MEDICINE

## 2020-01-01 PROCEDURE — C1894 INTRO/SHEATH, NON-LASER: HCPCS

## 2020-01-01 PROCEDURE — 99233 SBSQ HOSP IP/OBS HIGH 50: CPT | Performed by: FAMILY MEDICINE

## 2020-01-01 PROCEDURE — 70552 MRI BRAIN STEM W/DYE: CPT

## 2020-01-01 PROCEDURE — 25010000002 FENTANYL CITRATE (PF) 100 MCG/2ML SOLUTION: Performed by: ANESTHESIOLOGIST ASSISTANT

## 2020-01-01 PROCEDURE — 87102 FUNGUS ISOLATION CULTURE: CPT | Performed by: INTERNAL MEDICINE

## 2020-01-01 PROCEDURE — 86923 COMPATIBILITY TEST ELECTRIC: CPT

## 2020-01-01 PROCEDURE — 83735 ASSAY OF MAGNESIUM: CPT | Performed by: INTERNAL MEDICINE

## 2020-01-01 PROCEDURE — 89051 BODY FLUID CELL COUNT: CPT | Performed by: INTERNAL MEDICINE

## 2020-01-01 PROCEDURE — 82533 TOTAL CORTISOL: CPT | Performed by: NURSE PRACTITIONER

## 2020-01-01 PROCEDURE — 83605 ASSAY OF LACTIC ACID: CPT

## 2020-01-01 PROCEDURE — 97530 THERAPEUTIC ACTIVITIES: CPT

## 2020-01-01 PROCEDURE — 96415 CHEMO IV INFUSION ADDL HR: CPT

## 2020-01-01 PROCEDURE — 85007 BL SMEAR W/DIFF WBC COUNT: CPT | Performed by: EMERGENCY MEDICINE

## 2020-01-01 PROCEDURE — 84484 ASSAY OF TROPONIN QUANT: CPT | Performed by: NURSE PRACTITIONER

## 2020-01-01 PROCEDURE — 25010000002 DEXAMETHASONE PER 1 MG: Performed by: EMERGENCY MEDICINE

## 2020-01-01 PROCEDURE — A9576 INJ PROHANCE MULTIPACK: HCPCS | Performed by: NEUROLOGICAL SURGERY

## 2020-01-01 PROCEDURE — 93005 ELECTROCARDIOGRAM TRACING: CPT | Performed by: INTERNAL MEDICINE

## 2020-01-01 PROCEDURE — 25010000002 HYDROMORPHONE PER 4 MG: Performed by: ANESTHESIOLOGY

## 2020-01-01 PROCEDURE — 77334 RADIATION TREATMENT AID(S): CPT | Performed by: RADIOLOGY

## 2020-01-01 PROCEDURE — 31500 INSERT EMERGENCY AIRWAY: CPT | Performed by: NURSE PRACTITIONER

## 2020-01-01 PROCEDURE — 93306 TTE W/DOPPLER COMPLETE: CPT

## 2020-01-01 PROCEDURE — 70553 MRI BRAIN STEM W/O & W/DYE: CPT

## 2020-01-01 PROCEDURE — 63710000001 DEXAMETHASONE PER 0.25 MG: Performed by: NEUROLOGICAL SURGERY

## 2020-01-01 PROCEDURE — 95822 EEG COMA OR SLEEP ONLY: CPT | Performed by: PSYCHIATRY & NEUROLOGY

## 2020-01-01 PROCEDURE — 74176 CT ABD & PELVIS W/O CONTRAST: CPT

## 2020-01-01 PROCEDURE — 99024 POSTOP FOLLOW-UP VISIT: CPT | Performed by: NEUROLOGICAL SURGERY

## 2020-01-01 PROCEDURE — 84100 ASSAY OF PHOSPHORUS: CPT | Performed by: NEUROLOGICAL SURGERY

## 2020-01-01 PROCEDURE — 25010000002 PROPOFOL 10 MG/ML EMULSION

## 2020-01-01 PROCEDURE — 80053 COMPREHEN METABOLIC PANEL: CPT | Performed by: NEUROLOGICAL SURGERY

## 2020-01-01 PROCEDURE — 87206 SMEAR FLUORESCENT/ACID STAI: CPT | Performed by: INTERNAL MEDICINE

## 2020-01-01 PROCEDURE — 63710000001 ONDANSETRON PER 8 MG: Performed by: INTERNAL MEDICINE

## 2020-01-01 PROCEDURE — 94762 N-INVAS EAR/PLS OXIMTRY CONT: CPT

## 2020-01-01 PROCEDURE — 85014 HEMATOCRIT: CPT | Performed by: NURSE PRACTITIONER

## 2020-01-01 PROCEDURE — 87116 MYCOBACTERIA CULTURE: CPT | Performed by: INTERNAL MEDICINE

## 2020-01-01 PROCEDURE — 25010000003 HEPARIN LOCK FLUCH PER 10 UNITS: Performed by: INTERNAL MEDICINE

## 2020-01-01 PROCEDURE — 81003 URINALYSIS AUTO W/O SCOPE: CPT | Performed by: PHYSICIAN ASSISTANT

## 2020-01-01 PROCEDURE — 84443 ASSAY THYROID STIM HORMONE: CPT | Performed by: NURSE PRACTITIONER

## 2020-01-01 PROCEDURE — 80076 HEPATIC FUNCTION PANEL: CPT | Performed by: NEUROLOGICAL SURGERY

## 2020-01-01 PROCEDURE — 36415 COLL VENOUS BLD VENIPUNCTURE: CPT

## 2020-01-01 PROCEDURE — 82746 ASSAY OF FOLIC ACID SERUM: CPT | Performed by: NURSE PRACTITIONER

## 2020-01-01 PROCEDURE — 81003 URINALYSIS AUTO W/O SCOPE: CPT | Performed by: INTERNAL MEDICINE

## 2020-01-01 PROCEDURE — 25010000002 MORPHINE PER 10 MG: Performed by: HOSPITALIST

## 2020-01-01 PROCEDURE — 61750 INCISE SKULL/BRAIN BIOPSY: CPT | Performed by: NEUROLOGICAL SURGERY

## 2020-01-01 PROCEDURE — 81001 URINALYSIS AUTO W/SCOPE: CPT | Performed by: NURSE PRACTITIONER

## 2020-01-01 PROCEDURE — 97116 GAIT TRAINING THERAPY: CPT

## 2020-01-01 PROCEDURE — 80048 BASIC METABOLIC PNL TOTAL CA: CPT | Performed by: EMERGENCY MEDICINE

## 2020-01-01 PROCEDURE — 25010000003 LEVETIRACETAM IN NACL 0.75% 1000 MG/100ML SOLUTION: Performed by: NURSE PRACTITIONER

## 2020-01-01 PROCEDURE — 93005 ELECTROCARDIOGRAM TRACING: CPT | Performed by: EMERGENCY MEDICINE

## 2020-01-01 PROCEDURE — 99213 OFFICE O/P EST LOW 20 MIN: CPT | Performed by: INTERNAL MEDICINE

## 2020-01-01 PROCEDURE — 86850 RBC ANTIBODY SCREEN: CPT | Performed by: NURSE PRACTITIONER

## 2020-01-01 PROCEDURE — 83735 ASSAY OF MAGNESIUM: CPT | Performed by: NEUROLOGICAL SURGERY

## 2020-01-01 PROCEDURE — 80048 BASIC METABOLIC PNL TOTAL CA: CPT | Performed by: HOSPITALIST

## 2020-01-01 PROCEDURE — 86850 RBC ANTIBODY SCREEN: CPT | Performed by: PHYSICIAN ASSISTANT

## 2020-01-01 PROCEDURE — 80307 DRUG TEST PRSMV CHEM ANLYZR: CPT | Performed by: NURSE PRACTITIONER

## 2020-01-01 PROCEDURE — 99233 SBSQ HOSP IP/OBS HIGH 50: CPT | Performed by: HOSPITALIST

## 2020-01-01 PROCEDURE — 99284 EMERGENCY DEPT VISIT MOD MDM: CPT

## 2020-01-01 PROCEDURE — 87205 SMEAR GRAM STAIN: CPT | Performed by: RADIOLOGY

## 2020-01-01 PROCEDURE — 87147 CULTURE TYPE IMMUNOLOGIC: CPT | Performed by: NURSE PRACTITIONER

## 2020-01-01 PROCEDURE — 25010000002 BEVACIZUMAB-AWWB 400 MG/16ML SOLUTION 16 ML VIAL: Performed by: NURSE PRACTITIONER

## 2020-01-01 PROCEDURE — 84466 ASSAY OF TRANSFERRIN: CPT | Performed by: NURSE PRACTITIONER

## 2020-01-01 PROCEDURE — 82607 VITAMIN B-12: CPT | Performed by: NURSE PRACTITIONER

## 2020-01-01 PROCEDURE — 99222 1ST HOSP IP/OBS MODERATE 55: CPT | Performed by: NEUROLOGICAL SURGERY

## 2020-01-01 PROCEDURE — 25010000002 DEXAMETHASONE SODIUM PHOSPHATE 20 MG/5ML SOLUTION 5 ML VIAL: Performed by: NEUROLOGICAL SURGERY

## 2020-01-01 PROCEDURE — 99222 1ST HOSP IP/OBS MODERATE 55: CPT | Performed by: NURSE PRACTITIONER

## 2020-01-01 PROCEDURE — C1729 CATH, DRAINAGE: HCPCS

## 2020-01-01 PROCEDURE — 76942 ECHO GUIDE FOR BIOPSY: CPT

## 2020-01-01 PROCEDURE — 25010000002 PROPOFOL 10 MG/ML EMULSION: Performed by: ANESTHESIOLOGIST ASSISTANT

## 2020-01-01 PROCEDURE — 25010000002 VANCOMYCIN 10 G RECONSTITUTED SOLUTION: Performed by: NURSE PRACTITIONER

## 2020-01-01 PROCEDURE — 25010000002 CEFEPIME IN SWFI 2 GM/10ML IV PUSH SYRINGE (SIMPLE): Performed by: EMERGENCY MEDICINE

## 2020-01-01 PROCEDURE — 87070 CULTURE OTHR SPECIMN AEROBIC: CPT | Performed by: RADIOLOGY

## 2020-01-01 PROCEDURE — 93296 REM INTERROG EVL PM/IDS: CPT | Performed by: INTERNAL MEDICINE

## 2020-01-01 PROCEDURE — 85610 PROTHROMBIN TIME: CPT | Performed by: PHYSICIAN ASSISTANT

## 2020-01-01 PROCEDURE — 25010000002 ONDANSETRON PER 1 MG: Performed by: ANESTHESIOLOGIST ASSISTANT

## 2020-01-01 PROCEDURE — 00B90ZX EXCISION OF THALAMUS, OPEN APPROACH, DIAGNOSTIC: ICD-10-PCS | Performed by: NEUROLOGICAL SURGERY

## 2020-01-01 PROCEDURE — 25010000002 ENOXAPARIN PER 10 MG: Performed by: INTERNAL MEDICINE

## 2020-01-01 PROCEDURE — 87205 SMEAR GRAM STAIN: CPT | Performed by: INTERNAL MEDICINE

## 2020-01-01 PROCEDURE — A9576 INJ PROHANCE MULTIPACK: HCPCS | Performed by: EMERGENCY MEDICINE

## 2020-01-01 PROCEDURE — 85652 RBC SED RATE AUTOMATED: CPT | Performed by: NURSE PRACTITIONER

## 2020-01-01 PROCEDURE — 94640 AIRWAY INHALATION TREATMENT: CPT

## 2020-01-01 PROCEDURE — 99232 SBSQ HOSP IP/OBS MODERATE 35: CPT | Performed by: NURSE PRACTITIONER

## 2020-01-01 PROCEDURE — 0099U HC BIOFIRE FILMARRAY RESP PANEL 1: CPT | Performed by: FAMILY MEDICINE

## 2020-01-01 PROCEDURE — P9612 CATHETERIZE FOR URINE SPEC: HCPCS

## 2020-01-01 PROCEDURE — 25010000002 HYDROMORPHONE PER 4 MG: Performed by: NURSE PRACTITIONER

## 2020-01-01 PROCEDURE — 80076 HEPATIC FUNCTION PANEL: CPT | Performed by: NURSE PRACTITIONER

## 2020-01-01 PROCEDURE — 99223 1ST HOSP IP/OBS HIGH 75: CPT | Performed by: INTERNAL MEDICINE

## 2020-01-01 PROCEDURE — 36591 DRAW BLOOD OFF VENOUS DEVICE: CPT

## 2020-01-01 PROCEDURE — 88381 MICRODISSECTION MANUAL: CPT

## 2020-01-01 PROCEDURE — 80162 ASSAY OF DIGOXIN TOTAL: CPT | Performed by: EMERGENCY MEDICINE

## 2020-01-01 PROCEDURE — 84132 ASSAY OF SERUM POTASSIUM: CPT | Performed by: INTERNAL MEDICINE

## 2020-01-01 PROCEDURE — 85007 BL SMEAR W/DIFF WBC COUNT: CPT | Performed by: INTERNAL MEDICINE

## 2020-01-01 PROCEDURE — 25010000002 LORAZEPAM PER 2 MG: Performed by: NURSE PRACTITIONER

## 2020-01-01 PROCEDURE — 86900 BLOOD TYPING SEROLOGIC ABO: CPT | Performed by: NURSE PRACTITIONER

## 2020-01-01 PROCEDURE — 85018 HEMOGLOBIN: CPT | Performed by: NURSE PRACTITIONER

## 2020-01-01 PROCEDURE — 25010000003 LIDOCAINE 1 % SOLUTION

## 2020-01-01 PROCEDURE — 0BH17EZ INSERTION OF ENDOTRACHEAL AIRWAY INTO TRACHEA, VIA NATURAL OR ARTIFICIAL OPENING: ICD-10-PCS | Performed by: INTERNAL MEDICINE

## 2020-01-01 PROCEDURE — C1894 INTRO/SHEATH, NON-LASER: HCPCS | Performed by: INTERNAL MEDICINE

## 2020-01-01 PROCEDURE — 87040 BLOOD CULTURE FOR BACTERIA: CPT | Performed by: NURSE PRACTITIONER

## 2020-01-01 PROCEDURE — 25010000002 VANCOMYCIN 750 MG RECONSTITUTED SOLUTION 1 EACH VIAL: Performed by: EMERGENCY MEDICINE

## 2020-01-01 PROCEDURE — 25010000002 FENTANYL CITRATE (PF) 100 MCG/2ML SOLUTION

## 2020-01-01 PROCEDURE — 76705 ECHO EXAM OF ABDOMEN: CPT

## 2020-01-01 PROCEDURE — 25010000002 FENTANYL CITRATE (PF) 100 MCG/2ML SOLUTION: Performed by: INTERNAL MEDICINE

## 2020-01-01 PROCEDURE — 25010000002 BEVACIZUMAB-AWWB 400 MG/16ML SOLUTION 16 ML VIAL: Performed by: INTERNAL MEDICINE

## 2020-01-01 PROCEDURE — 74018 RADEX ABDOMEN 1 VIEW: CPT

## 2020-01-01 PROCEDURE — 63710000001 DEXAMETHASONE PER 0.25 MG: Performed by: INTERNAL MEDICINE

## 2020-01-01 PROCEDURE — 85610 PROTHROMBIN TIME: CPT | Performed by: EMERGENCY MEDICINE

## 2020-01-01 PROCEDURE — 82140 ASSAY OF AMMONIA: CPT | Performed by: HOSPITALIST

## 2020-01-01 PROCEDURE — 93294 REM INTERROG EVL PM/LDLS PM: CPT | Performed by: INTERNAL MEDICINE

## 2020-01-01 PROCEDURE — 99221 1ST HOSP IP/OBS SF/LOW 40: CPT | Performed by: SURGERY

## 2020-01-01 PROCEDURE — 85730 THROMBOPLASTIN TIME PARTIAL: CPT | Performed by: PHYSICIAN ASSISTANT

## 2020-01-01 PROCEDURE — 88331 PATH CONSLTJ SURG 1 BLK 1SPC: CPT | Performed by: NEUROLOGICAL SURGERY

## 2020-01-01 PROCEDURE — 85610 PROTHROMBIN TIME: CPT | Performed by: INTERNAL MEDICINE

## 2020-01-01 PROCEDURE — 86901 BLOOD TYPING SEROLOGIC RH(D): CPT

## 2020-01-01 PROCEDURE — 85045 AUTOMATED RETICULOCYTE COUNT: CPT | Performed by: NURSE PRACTITIONER

## 2020-01-01 PROCEDURE — 25010000002 GADOTERIDOL PER 1 ML: Performed by: EMERGENCY MEDICINE

## 2020-01-01 PROCEDURE — 25010000002 CEFEPIME PER 500 MG: Performed by: NURSE PRACTITIONER

## 2020-01-01 PROCEDURE — 99231 SBSQ HOSP IP/OBS SF/LOW 25: CPT | Performed by: INTERNAL MEDICINE

## 2020-01-01 PROCEDURE — 84484 ASSAY OF TROPONIN QUANT: CPT | Performed by: HOSPITALIST

## 2020-01-01 PROCEDURE — 25010000002 MIDAZOLAM PER 1 MG: Performed by: INTERNAL MEDICINE

## 2020-01-01 PROCEDURE — 99232 SBSQ HOSP IP/OBS MODERATE 35: CPT | Performed by: HOSPITALIST

## 2020-01-01 PROCEDURE — 87086 URINE CULTURE/COLONY COUNT: CPT | Performed by: NURSE PRACTITIONER

## 2020-01-01 PROCEDURE — 83735 ASSAY OF MAGNESIUM: CPT | Performed by: EMERGENCY MEDICINE

## 2020-01-01 PROCEDURE — C1898 LEAD, PMKR, OTHER THAN TRANS: HCPCS | Performed by: INTERNAL MEDICINE

## 2020-01-01 PROCEDURE — 0097U HC BIOFIRE FILMARRAY GI PANEL: CPT | Performed by: NURSE PRACTITIONER

## 2020-01-01 PROCEDURE — 86901 BLOOD TYPING SEROLOGIC RH(D): CPT | Performed by: PHYSICIAN ASSISTANT

## 2020-01-01 PROCEDURE — 94002 VENT MGMT INPAT INIT DAY: CPT

## 2020-01-01 PROCEDURE — 0 IOPAMIDOL PER 1 ML: Performed by: INTERNAL MEDICINE

## 2020-01-01 PROCEDURE — 83010 ASSAY OF HAPTOGLOBIN QUANT: CPT | Performed by: NURSE PRACTITIONER

## 2020-01-01 PROCEDURE — 97112 NEUROMUSCULAR REEDUCATION: CPT

## 2020-01-01 PROCEDURE — 86901 BLOOD TYPING SEROLOGIC RH(D): CPT | Performed by: NURSE PRACTITIONER

## 2020-01-01 PROCEDURE — 85025 COMPLETE CBC W/AUTO DIFF WBC: CPT

## 2020-01-01 PROCEDURE — 0099U HC BIOFIRE FILMARRAY RESP PANEL 1: CPT | Performed by: NURSE PRACTITIONER

## 2020-01-01 PROCEDURE — 82274 ASSAY TEST FOR BLOOD FECAL: CPT | Performed by: NURSE PRACTITIONER

## 2020-01-01 PROCEDURE — 92610 EVALUATE SWALLOWING FUNCTION: CPT

## 2020-01-01 PROCEDURE — 82436 ASSAY OF URINE CHLORIDE: CPT | Performed by: INTERNAL MEDICINE

## 2020-01-01 PROCEDURE — 99024 POSTOP FOLLOW-UP VISIT: CPT | Performed by: INTERNAL MEDICINE

## 2020-01-01 PROCEDURE — 85007 BL SMEAR W/DIFF WBC COUNT: CPT | Performed by: HOSPITALIST

## 2020-01-01 PROCEDURE — 99153 MOD SED SAME PHYS/QHP EA: CPT | Performed by: INTERNAL MEDICINE

## 2020-01-01 PROCEDURE — 25010000002 FUROSEMIDE PER 20 MG: Performed by: EMERGENCY MEDICINE

## 2020-01-01 PROCEDURE — 88307 TISSUE EXAM BY PATHOLOGIST: CPT | Performed by: NEUROLOGICAL SURGERY

## 2020-01-01 PROCEDURE — 83735 ASSAY OF MAGNESIUM: CPT | Performed by: HOSPITALIST

## 2020-01-01 PROCEDURE — 02HK3JZ INSERTION OF PACEMAKER LEAD INTO RIGHT VENTRICLE, PERCUTANEOUS APPROACH: ICD-10-PCS | Performed by: INTERNAL MEDICINE

## 2020-01-01 PROCEDURE — 83690 ASSAY OF LIPASE: CPT | Performed by: EMERGENCY MEDICINE

## 2020-01-01 PROCEDURE — 80053 COMPREHEN METABOLIC PANEL: CPT | Performed by: EMERGENCY MEDICINE

## 2020-01-01 PROCEDURE — 93306 TTE W/DOPPLER COMPLETE: CPT | Performed by: INTERNAL MEDICINE

## 2020-01-01 PROCEDURE — 86900 BLOOD TYPING SEROLOGIC ABO: CPT

## 2020-01-01 PROCEDURE — 99213 OFFICE O/P EST LOW 20 MIN: CPT | Performed by: NEUROLOGICAL SURGERY

## 2020-01-01 PROCEDURE — 25010000002 HYDRALAZINE PER 20 MG: Performed by: ANESTHESIOLOGIST ASSISTANT

## 2020-01-01 DEVICE — IMPLANTABLE DEVICE: Type: IMPLANTABLE DEVICE | Status: FUNCTIONAL

## 2020-01-01 DEVICE — PACEMAKER
Type: IMPLANTABLE DEVICE | Status: FUNCTIONAL
Brand: ACCOLADE™ MRI SR

## 2020-01-01 DEVICE — LOW PROFILE BHC PLATE,W/TAB, 14MM
Type: IMPLANTABLE DEVICE | Status: FUNCTIONAL
Brand: UNIVERSAL NEURO 2

## 2020-01-01 DEVICE — SCREW, AXS, SELF-DRILLING
Type: IMPLANTABLE DEVICE | Status: FUNCTIONAL
Brand: UNIVERSAL NEURO 3

## 2020-01-01 RX ORDER — MORPHINE SULFATE 4 MG/ML
4 INJECTION, SOLUTION INTRAMUSCULAR; INTRAVENOUS
Status: DISCONTINUED | OUTPATIENT
Start: 2020-01-01 | End: 2020-01-01 | Stop reason: HOSPADM

## 2020-01-01 RX ORDER — LANOLIN ALCOHOL/MO/W.PET/CERES
1000 CREAM (GRAM) TOPICAL DAILY
Status: DISCONTINUED | OUTPATIENT
Start: 2020-01-01 | End: 2020-01-01 | Stop reason: HOSPADM

## 2020-01-01 RX ORDER — DOCUSATE SODIUM 100 MG/1
100 CAPSULE, LIQUID FILLED ORAL 2 TIMES DAILY PRN
Status: DISCONTINUED | OUTPATIENT
Start: 2020-01-01 | End: 2020-01-01

## 2020-01-01 RX ORDER — HYDROCODONE BITARTRATE AND ACETAMINOPHEN 5; 325 MG/1; MG/1
1 TABLET ORAL EVERY 4 HOURS PRN
Status: DISCONTINUED | OUTPATIENT
Start: 2020-01-01 | End: 2020-01-01 | Stop reason: HOSPADM

## 2020-01-01 RX ORDER — ATORVASTATIN CALCIUM 20 MG/1
20 TABLET, FILM COATED ORAL NIGHTLY
Status: DISCONTINUED | OUTPATIENT
Start: 2020-01-01 | End: 2020-01-01 | Stop reason: HOSPADM

## 2020-01-01 RX ORDER — ALLOPURINOL 100 MG/1
200 TABLET ORAL DAILY
Status: DISCONTINUED | OUTPATIENT
Start: 2020-01-01 | End: 2020-01-01 | Stop reason: HOSPADM

## 2020-01-01 RX ORDER — ALBUTEROL SULFATE 90 UG/1
2 AEROSOL, METERED RESPIRATORY (INHALATION)
Status: DISCONTINUED | OUTPATIENT
Start: 2020-01-01 | End: 2020-01-01

## 2020-01-01 RX ORDER — SODIUM CHLORIDE 0.9 % (FLUSH) 0.9 %
10 SYRINGE (ML) INJECTION AS NEEDED
Status: DISCONTINUED | OUTPATIENT
Start: 2020-01-01 | End: 2020-01-01 | Stop reason: HOSPADM

## 2020-01-01 RX ORDER — ONDANSETRON 4 MG/1
4 TABLET, FILM COATED ORAL EVERY 6 HOURS PRN
Status: DISCONTINUED | OUTPATIENT
Start: 2020-01-01 | End: 2020-01-01 | Stop reason: HOSPADM

## 2020-01-01 RX ORDER — ASPIRIN 81 MG/1
81 TABLET ORAL DAILY
COMMUNITY
End: 2020-01-01

## 2020-01-01 RX ORDER — BUMETANIDE 0.25 MG/ML
2 INJECTION INTRAMUSCULAR; INTRAVENOUS ONCE
Status: COMPLETED | OUTPATIENT
Start: 2020-01-01 | End: 2020-01-01

## 2020-01-01 RX ORDER — ATORVASTATIN CALCIUM 20 MG/1
20 TABLET, FILM COATED ORAL NIGHTLY
COMMUNITY

## 2020-01-01 RX ORDER — FENTANYL CITRATE 50 UG/ML
INJECTION, SOLUTION INTRAMUSCULAR; INTRAVENOUS AS NEEDED
Status: DISCONTINUED | OUTPATIENT
Start: 2020-01-01 | End: 2020-01-01 | Stop reason: HOSPADM

## 2020-01-01 RX ORDER — SODIUM CHLORIDE 9 MG/ML
9 INJECTION, SOLUTION INTRAVENOUS CONTINUOUS PRN
Status: DISCONTINUED | OUTPATIENT
Start: 2020-01-01 | End: 2020-01-01 | Stop reason: HOSPADM

## 2020-01-01 RX ORDER — ACETAMINOPHEN,DIPHENHYDRAMINE HCL 500; 25 MG/1; MG/1
1 TABLET, FILM COATED ORAL NIGHTLY PRN
COMMUNITY
End: 2020-01-01

## 2020-01-01 RX ORDER — HYDROMORPHONE HCL 110MG/55ML
0.5 PATIENT CONTROLLED ANALGESIA SYRINGE INTRAVENOUS ONCE
Status: COMPLETED | OUTPATIENT
Start: 2020-01-01 | End: 2020-01-01

## 2020-01-01 RX ORDER — VANCOMYCIN HYDROCHLORIDE
15 EVERY 12 HOURS
Status: DISCONTINUED | OUTPATIENT
Start: 2020-01-01 | End: 2020-01-01

## 2020-01-01 RX ORDER — CEFAZOLIN SODIUM IN 0.9 % NACL 3 G/100 ML
3 INTRAVENOUS SOLUTION, PIGGYBACK (ML) INTRAVENOUS ONCE
Status: COMPLETED | OUTPATIENT
Start: 2020-01-01 | End: 2020-01-01

## 2020-01-01 RX ORDER — POTASSIUM CHLORIDE 20 MEQ/1
40 TABLET, EXTENDED RELEASE ORAL ONCE
Status: COMPLETED | OUTPATIENT
Start: 2020-01-01 | End: 2020-01-01

## 2020-01-01 RX ORDER — DIGOXIN 0.25 MG/ML
125 INJECTION INTRAMUSCULAR; INTRAVENOUS
Status: DISCONTINUED | OUTPATIENT
Start: 2020-01-01 | End: 2020-01-01

## 2020-01-01 RX ORDER — HEPARIN SODIUM (PORCINE) LOCK FLUSH IV SOLN 100 UNIT/ML 100 UNIT/ML
500 SOLUTION INTRAVENOUS AS NEEDED
Status: CANCELLED | OUTPATIENT
Start: 2020-01-01

## 2020-01-01 RX ORDER — ONDANSETRON 4 MG/1
4 TABLET, FILM COATED ORAL EVERY 6 HOURS PRN
Status: DISCONTINUED | OUTPATIENT
Start: 2020-01-01 | End: 2020-01-01 | Stop reason: SDUPTHER

## 2020-01-01 RX ORDER — GLYCOPYRROLATE 0.2 MG/ML
0.2 INJECTION INTRAMUSCULAR; INTRAVENOUS
Status: DISCONTINUED | OUTPATIENT
Start: 2020-01-01 | End: 2020-01-01 | Stop reason: HOSPADM

## 2020-01-01 RX ORDER — FUROSEMIDE 10 MG/ML
20 INJECTION INTRAMUSCULAR; INTRAVENOUS ONCE
Status: DISCONTINUED | OUTPATIENT
Start: 2020-01-01 | End: 2020-01-01

## 2020-01-01 RX ORDER — PANTOPRAZOLE SODIUM 40 MG/1
40 TABLET, DELAYED RELEASE ORAL 2 TIMES DAILY
Status: DISCONTINUED | OUTPATIENT
Start: 2020-01-01 | End: 2020-01-01 | Stop reason: HOSPADM

## 2020-01-01 RX ORDER — OXYCODONE HYDROCHLORIDE 5 MG/1
7.5 TABLET ORAL EVERY 4 HOURS PRN
Status: DISCONTINUED | OUTPATIENT
Start: 2020-01-01 | End: 2020-01-01

## 2020-01-01 RX ORDER — SODIUM CHLORIDE 0.9 % (FLUSH) 0.9 %
3-10 SYRINGE (ML) INJECTION AS NEEDED
Status: DISCONTINUED | OUTPATIENT
Start: 2020-01-01 | End: 2020-01-01 | Stop reason: HOSPADM

## 2020-01-01 RX ORDER — PROPOFOL 10 MG/ML
VIAL (ML) INTRAVENOUS AS NEEDED
Status: DISCONTINUED | OUTPATIENT
Start: 2020-01-01 | End: 2020-01-01 | Stop reason: SURG

## 2020-01-01 RX ORDER — POTASSIUM CHLORIDE 1.5 G/1.77G
40 POWDER, FOR SOLUTION ORAL ONCE
Status: DISCONTINUED | OUTPATIENT
Start: 2020-01-01 | End: 2020-01-01

## 2020-01-01 RX ORDER — SODIUM CHLORIDE 0.9 % (FLUSH) 0.9 %
3 SYRINGE (ML) INJECTION EVERY 12 HOURS SCHEDULED
Status: DISCONTINUED | OUTPATIENT
Start: 2020-01-01 | End: 2020-01-01 | Stop reason: HOSPADM

## 2020-01-01 RX ORDER — DILTIAZEM HYDROCHLORIDE 5 MG/ML
10 INJECTION INTRAVENOUS ONCE
Status: DISCONTINUED | OUTPATIENT
Start: 2020-01-01 | End: 2020-01-01

## 2020-01-01 RX ORDER — BUMETANIDE 0.25 MG/ML
1 INJECTION INTRAMUSCULAR; INTRAVENOUS ONCE
Status: COMPLETED | OUTPATIENT
Start: 2020-01-01 | End: 2020-01-01

## 2020-01-01 RX ORDER — TRIAMTERENE AND HYDROCHLOROTHIAZIDE 75; 50 MG/1; MG/1
1 TABLET ORAL DAILY
Status: DISCONTINUED | OUTPATIENT
Start: 2020-01-01 | End: 2020-01-01 | Stop reason: HOSPADM

## 2020-01-01 RX ORDER — LEVETIRACETAM 10 MG/ML
1000 INJECTION INTRAVASCULAR ONCE
Status: COMPLETED | OUTPATIENT
Start: 2020-01-01 | End: 2020-01-01

## 2020-01-01 RX ORDER — DOCUSATE SODIUM 100 MG/1
100 CAPSULE, LIQUID FILLED ORAL 2 TIMES DAILY PRN
Status: DISCONTINUED | OUTPATIENT
Start: 2020-01-01 | End: 2020-01-01 | Stop reason: HOSPADM

## 2020-01-01 RX ORDER — ONDANSETRON 2 MG/ML
INJECTION INTRAMUSCULAR; INTRAVENOUS AS NEEDED
Status: DISCONTINUED | OUTPATIENT
Start: 2020-01-01 | End: 2020-01-01 | Stop reason: SURG

## 2020-01-01 RX ORDER — DEXAMETHASONE SODIUM PHOSPHATE 10 MG/ML
10 INJECTION, SOLUTION INTRAMUSCULAR; INTRAVENOUS ONCE
Status: COMPLETED | OUTPATIENT
Start: 2020-01-01 | End: 2020-01-01

## 2020-01-01 RX ORDER — TEMOZOLOMIDE 180 MG/1
180 CAPSULE ORAL DAILY
Qty: 42 CAPSULE | Refills: 0 | Status: SHIPPED | OUTPATIENT
Start: 2020-01-01 | End: 2020-01-01

## 2020-01-01 RX ORDER — SODIUM CHLORIDE 0.9 % (FLUSH) 0.9 %
3-10 SYRINGE (ML) INJECTION AS NEEDED
Status: CANCELLED | OUTPATIENT
Start: 2020-01-01

## 2020-01-01 RX ORDER — DILTIAZEM HYDROCHLORIDE 5 MG/ML
10 INJECTION INTRAVENOUS ONCE
Status: COMPLETED | OUTPATIENT
Start: 2020-01-01 | End: 2020-01-01

## 2020-01-01 RX ORDER — PANTOPRAZOLE SODIUM 40 MG/1
40 TABLET, DELAYED RELEASE ORAL EVERY MORNING
Status: DISCONTINUED | OUTPATIENT
Start: 2020-01-01 | End: 2020-01-01 | Stop reason: HOSPADM

## 2020-01-01 RX ORDER — ACETAMINOPHEN 325 MG/1
650 TABLET ORAL EVERY 4 HOURS PRN
Status: DISCONTINUED | OUTPATIENT
Start: 2020-01-01 | End: 2020-01-01

## 2020-01-01 RX ORDER — SODIUM CHLORIDE 9 MG/ML
250 INJECTION, SOLUTION INTRAVENOUS ONCE
Status: CANCELLED | OUTPATIENT
Start: 2020-01-01

## 2020-01-01 RX ORDER — FLUTICASONE PROPIONATE 50 MCG
1 SPRAY, SUSPENSION (ML) NASAL 2 TIMES DAILY
Status: DISCONTINUED | OUTPATIENT
Start: 2020-01-01 | End: 2020-01-01 | Stop reason: HOSPADM

## 2020-01-01 RX ORDER — PROPOFOL 10 MG/ML
VIAL (ML) INTRAVENOUS
Status: COMPLETED
Start: 2020-01-01 | End: 2020-01-01

## 2020-01-01 RX ORDER — FUROSEMIDE 40 MG/1
20 TABLET ORAL 2 TIMES DAILY
Start: 2020-01-01 | End: 2020-01-01

## 2020-01-01 RX ORDER — HEPARIN SODIUM 10000 [USP'U]/100ML
8.26 INJECTION, SOLUTION INTRAVENOUS
Status: DISCONTINUED | OUTPATIENT
Start: 2020-01-01 | End: 2020-01-01

## 2020-01-01 RX ORDER — ACETAMINOPHEN 160 MG/5ML
650 SOLUTION ORAL EVERY 4 HOURS PRN
Status: DISCONTINUED | OUTPATIENT
Start: 2020-01-01 | End: 2020-01-01 | Stop reason: HOSPADM

## 2020-01-01 RX ORDER — ONDANSETRON 4 MG/1
4 TABLET, FILM COATED ORAL EVERY 6 HOURS PRN
Qty: 30 TABLET | Refills: 1 | Status: SHIPPED | OUTPATIENT
Start: 2020-01-01

## 2020-01-01 RX ORDER — GLYCOPYRROLATE 0.2 MG/ML
0.4 INJECTION INTRAMUSCULAR; INTRAVENOUS
Status: DISCONTINUED | OUTPATIENT
Start: 2020-01-01 | End: 2020-01-01 | Stop reason: HOSPADM

## 2020-01-01 RX ORDER — ACETAMINOPHEN 650 MG/1
650 SUPPOSITORY RECTAL EVERY 4 HOURS PRN
Status: DISCONTINUED | OUTPATIENT
Start: 2020-01-01 | End: 2020-01-01 | Stop reason: HOSPADM

## 2020-01-01 RX ORDER — SODIUM CHLORIDE, SODIUM LACTATE, POTASSIUM CHLORIDE, CALCIUM CHLORIDE 600; 310; 30; 20 MG/100ML; MG/100ML; MG/100ML; MG/100ML
INJECTION, SOLUTION INTRAVENOUS CONTINUOUS PRN
Status: DISCONTINUED | OUTPATIENT
Start: 2020-01-01 | End: 2020-01-01

## 2020-01-01 RX ORDER — DILTIAZEM HYDROCHLORIDE 120 MG/1
120 CAPSULE, COATED, EXTENDED RELEASE ORAL DAILY
Start: 2020-01-01 | End: 2020-01-01 | Stop reason: HOSPADM

## 2020-01-01 RX ORDER — DEXTROSE MONOHYDRATE 25 G/50ML
25 INJECTION, SOLUTION INTRAVENOUS
Status: DISCONTINUED | OUTPATIENT
Start: 2020-01-01 | End: 2020-01-01

## 2020-01-01 RX ORDER — ACETAMINOPHEN 650 MG/1
1300 SUPPOSITORY RECTAL ONCE
Status: COMPLETED | OUTPATIENT
Start: 2020-01-01 | End: 2020-01-01

## 2020-01-01 RX ORDER — ALUMINA, MAGNESIA, AND SIMETHICONE 2400; 2400; 240 MG/30ML; MG/30ML; MG/30ML
15 SUSPENSION ORAL EVERY 6 HOURS PRN
Status: DISCONTINUED | OUTPATIENT
Start: 2020-01-01 | End: 2020-01-01

## 2020-01-01 RX ORDER — OXYCODONE HYDROCHLORIDE 5 MG/1
7.5 TABLET ORAL EVERY 6 HOURS PRN
Status: DISCONTINUED | OUTPATIENT
Start: 2020-01-01 | End: 2020-01-01

## 2020-01-01 RX ORDER — LIDOCAINE HYDROCHLORIDE 10 MG/ML
INJECTION, SOLUTION EPIDURAL; INFILTRATION; INTRACAUDAL; PERINEURAL AS NEEDED
Status: DISCONTINUED | OUTPATIENT
Start: 2020-01-01 | End: 2020-01-01 | Stop reason: SURG

## 2020-01-01 RX ORDER — HYDROMORPHONE HCL 110MG/55ML
0.5 PATIENT CONTROLLED ANALGESIA SYRINGE INTRAVENOUS
Status: DISCONTINUED | OUTPATIENT
Start: 2020-01-01 | End: 2020-01-01 | Stop reason: HOSPADM

## 2020-01-01 RX ORDER — PANTOPRAZOLE SODIUM 40 MG/10ML
40 INJECTION, POWDER, LYOPHILIZED, FOR SOLUTION INTRAVENOUS
Status: DISCONTINUED | OUTPATIENT
Start: 2020-01-01 | End: 2020-01-01

## 2020-01-01 RX ORDER — ACETAMINOPHEN 325 MG/1
650 TABLET ORAL EVERY 4 HOURS PRN
Status: DISCONTINUED | OUTPATIENT
Start: 2020-01-01 | End: 2020-01-01 | Stop reason: HOSPADM

## 2020-01-01 RX ORDER — LORAZEPAM 2 MG/ML
1 INJECTION INTRAMUSCULAR
Status: DISCONTINUED | OUTPATIENT
Start: 2020-01-01 | End: 2020-01-01 | Stop reason: HOSPADM

## 2020-01-01 RX ORDER — SODIUM CHLORIDE 9 MG/ML
250 INJECTION, SOLUTION INTRAVENOUS ONCE
Status: COMPLETED | OUTPATIENT
Start: 2020-01-01 | End: 2020-01-01

## 2020-01-01 RX ORDER — SODIUM CHLORIDE 9 MG/ML
100 INJECTION, SOLUTION INTRAVENOUS CONTINUOUS
Status: DISCONTINUED | OUTPATIENT
Start: 2020-01-01 | End: 2020-01-01

## 2020-01-01 RX ORDER — SODIUM CHLORIDE 9 MG/ML
250 INJECTION, SOLUTION INTRAVENOUS ONCE
Status: DISCONTINUED | OUTPATIENT
Start: 2020-01-01 | End: 2020-01-01 | Stop reason: HOSPADM

## 2020-01-01 RX ORDER — DILTIAZEM HCL IN NACL,ISO-OSM 125 MG/125
5-15 PLASTIC BAG, INJECTION (ML) INTRAVENOUS
Status: DISCONTINUED | OUTPATIENT
Start: 2020-01-01 | End: 2020-01-01

## 2020-01-01 RX ORDER — LIDOCAINE HYDROCHLORIDE 10 MG/ML
INJECTION, SOLUTION INFILTRATION; PERINEURAL
Status: DISPENSED
Start: 2020-01-01 | End: 2020-01-01

## 2020-01-01 RX ORDER — FLUTICASONE PROPIONATE 50 MCG
1 SPRAY, SUSPENSION (ML) NASAL DAILY
Status: DISCONTINUED | OUTPATIENT
Start: 2020-01-01 | End: 2020-01-01 | Stop reason: HOSPADM

## 2020-01-01 RX ORDER — ONDANSETRON 2 MG/ML
4 INJECTION INTRAMUSCULAR; INTRAVENOUS EVERY 6 HOURS PRN
Status: DISCONTINUED | OUTPATIENT
Start: 2020-01-01 | End: 2020-01-01 | Stop reason: HOSPADM

## 2020-01-01 RX ORDER — AMOXICILLIN 500 MG/1
1000 CAPSULE ORAL 2 TIMES DAILY
COMMUNITY
End: 2020-01-01

## 2020-01-01 RX ORDER — IPRATROPIUM BROMIDE AND ALBUTEROL SULFATE 2.5; .5 MG/3ML; MG/3ML
3 SOLUTION RESPIRATORY (INHALATION)
Status: DISCONTINUED | OUTPATIENT
Start: 2020-01-01 | End: 2020-01-01 | Stop reason: ALTCHOICE

## 2020-01-01 RX ORDER — MONTELUKAST SODIUM 10 MG/1
TABLET ORAL
COMMUNITY
End: 2020-01-01

## 2020-01-01 RX ORDER — BUMETANIDE 1 MG/1
1 TABLET ORAL 2 TIMES DAILY
COMMUNITY

## 2020-01-01 RX ORDER — DEXAMETHASONE SODIUM PHOSPHATE 4 MG/ML
4 INJECTION, SOLUTION INTRA-ARTICULAR; INTRALESIONAL; INTRAMUSCULAR; INTRAVENOUS; SOFT TISSUE EVERY 6 HOURS
Status: DISCONTINUED | OUTPATIENT
Start: 2020-01-01 | End: 2020-01-01

## 2020-01-01 RX ORDER — ONDANSETRON 4 MG/1
4 TABLET, FILM COATED ORAL EVERY 6 HOURS PRN
Status: DISCONTINUED | OUTPATIENT
Start: 2020-01-01 | End: 2020-01-01

## 2020-01-01 RX ORDER — BISACODYL 10 MG
10 SUPPOSITORY, RECTAL RECTAL DAILY PRN
Status: DISCONTINUED | OUTPATIENT
Start: 2020-01-01 | End: 2020-01-01 | Stop reason: HOSPADM

## 2020-01-01 RX ORDER — TEMAZEPAM 15 MG/1
15 CAPSULE ORAL NIGHTLY PRN
Qty: 30 CAPSULE | Refills: 2 | Status: SHIPPED | OUTPATIENT
Start: 2020-01-01 | End: 2020-01-01

## 2020-01-01 RX ORDER — LABETALOL HYDROCHLORIDE 5 MG/ML
5 INJECTION, SOLUTION INTRAVENOUS
Status: DISCONTINUED | OUTPATIENT
Start: 2020-01-01 | End: 2020-01-01 | Stop reason: HOSPADM

## 2020-01-01 RX ORDER — ONDANSETRON 4 MG/1
4 TABLET, FILM COATED ORAL EVERY 6 HOURS PRN
COMMUNITY
End: 2020-01-01 | Stop reason: SDUPTHER

## 2020-01-01 RX ORDER — SODIUM CHLORIDE 0.9 % (FLUSH) 0.9 %
10 SYRINGE (ML) INJECTION AS NEEDED
Status: DISCONTINUED | OUTPATIENT
Start: 2020-01-01 | End: 2020-01-01

## 2020-01-01 RX ORDER — SODIUM CHLORIDE 0.9 % (FLUSH) 0.9 %
20 SYRINGE (ML) INJECTION AS NEEDED
Status: DISCONTINUED | OUTPATIENT
Start: 2020-01-01 | End: 2020-01-01 | Stop reason: HOSPADM

## 2020-01-01 RX ORDER — OXYCODONE AND ACETAMINOPHEN 7.5; 325 MG/1; MG/1
1 TABLET ORAL EVERY 4 HOURS PRN
COMMUNITY

## 2020-01-01 RX ORDER — ALBUTEROL SULFATE 2.5 MG/3ML
2.5 SOLUTION RESPIRATORY (INHALATION)
Status: DISCONTINUED | OUTPATIENT
Start: 2020-01-01 | End: 2020-01-01

## 2020-01-01 RX ORDER — TEMAZEPAM 30 MG/1
30 CAPSULE ORAL NIGHTLY PRN
COMMUNITY

## 2020-01-01 RX ORDER — SUCRALFATE 1 G/1
1 TABLET ORAL
Status: DISCONTINUED | OUTPATIENT
Start: 2020-01-01 | End: 2020-01-01 | Stop reason: HOSPADM

## 2020-01-01 RX ORDER — CHLORHEXIDINE GLUCONATE 0.12 MG/ML
15 RINSE ORAL EVERY 12 HOURS SCHEDULED
Status: DISCONTINUED | OUTPATIENT
Start: 2020-01-01 | End: 2020-01-01

## 2020-01-01 RX ORDER — NICOTINE POLACRILEX 4 MG
15 LOZENGE BUCCAL
Status: DISCONTINUED | OUTPATIENT
Start: 2020-01-01 | End: 2020-01-01

## 2020-01-01 RX ORDER — BUMETANIDE 0.25 MG/ML
0.5 INJECTION INTRAMUSCULAR; INTRAVENOUS ONCE
Status: COMPLETED | OUTPATIENT
Start: 2020-01-01 | End: 2020-01-01

## 2020-01-01 RX ORDER — NITROGLYCERIN 0.4 MG/1
0.4 TABLET SUBLINGUAL
Status: DISCONTINUED | OUTPATIENT
Start: 2020-01-01 | End: 2020-01-01

## 2020-01-01 RX ORDER — TRAMADOL HYDROCHLORIDE 50 MG/1
50 TABLET ORAL EVERY 8 HOURS PRN
COMMUNITY
Start: 2020-01-01 | End: 2020-01-01 | Stop reason: HOSPADM

## 2020-01-01 RX ORDER — DIGOXIN 125 MCG
125 TABLET ORAL
Status: DISCONTINUED | OUTPATIENT
Start: 2020-01-01 | End: 2020-01-01 | Stop reason: HOSPADM

## 2020-01-01 RX ORDER — FENTANYL CITRATE 50 UG/ML
INJECTION, SOLUTION INTRAMUSCULAR; INTRAVENOUS
Status: COMPLETED
Start: 2020-01-01 | End: 2020-01-01

## 2020-01-01 RX ORDER — ETOMIDATE 2 MG/ML
20 INJECTION INTRAVENOUS ONCE
Status: DISCONTINUED | OUTPATIENT
Start: 2020-01-01 | End: 2020-01-01

## 2020-01-01 RX ORDER — MIDAZOLAM HYDROCHLORIDE 1 MG/ML
2 INJECTION INTRAMUSCULAR; INTRAVENOUS EVERY 4 HOURS PRN
Status: DISCONTINUED | OUTPATIENT
Start: 2020-01-01 | End: 2020-01-01

## 2020-01-01 RX ORDER — BISACODYL 10 MG
10 SUPPOSITORY, RECTAL RECTAL DAILY PRN
Status: DISCONTINUED | OUTPATIENT
Start: 2020-01-01 | End: 2020-01-01

## 2020-01-01 RX ORDER — AMIODARONE HYDROCHLORIDE 200 MG/1
200 TABLET ORAL
Status: DISCONTINUED | OUTPATIENT
Start: 2020-01-01 | End: 2020-01-01

## 2020-01-01 RX ORDER — DEXAMETHASONE 0.5 MG/1
2 TABLET ORAL 2 TIMES DAILY WITH MEALS
Status: DISCONTINUED | OUTPATIENT
Start: 2020-01-01 | End: 2020-01-01 | Stop reason: HOSPADM

## 2020-01-01 RX ORDER — NEOSTIGMINE METHYLSULFATE 5 MG/5 ML
SYRINGE (ML) INTRAVENOUS AS NEEDED
Status: DISCONTINUED | OUTPATIENT
Start: 2020-01-01 | End: 2020-01-01 | Stop reason: SURG

## 2020-01-01 RX ORDER — HYDROCODONE BITARTRATE AND ACETAMINOPHEN 10; 325 MG/1; MG/1
1 TABLET ORAL EVERY 4 HOURS PRN
Status: DISCONTINUED | OUTPATIENT
Start: 2020-01-01 | End: 2020-01-01 | Stop reason: HOSPADM

## 2020-01-01 RX ORDER — NOREPINEPHRINE BIT/0.9 % NACL 8 MG/250ML
.02-.3 INFUSION BOTTLE (ML) INTRAVENOUS
Status: DISCONTINUED | OUTPATIENT
Start: 2020-01-01 | End: 2020-01-01

## 2020-01-01 RX ORDER — SODIUM CHLORIDE 0.9 % (FLUSH) 0.9 %
20 SYRINGE (ML) INJECTION AS NEEDED
Status: CANCELLED | OUTPATIENT
Start: 2020-01-01

## 2020-01-01 RX ORDER — DEXAMETHASONE 2 MG/1
2 TABLET ORAL EVERY 12 HOURS SCHEDULED
Qty: 60 TABLET | Refills: 0 | Status: SHIPPED | OUTPATIENT
Start: 2020-01-01 | End: 2020-01-01

## 2020-01-01 RX ORDER — DEXAMETHASONE 0.5 MG/1
2 TABLET ORAL 2 TIMES DAILY WITH MEALS
Status: DISCONTINUED | OUTPATIENT
Start: 2020-01-01 | End: 2020-01-01

## 2020-01-01 RX ORDER — FUROSEMIDE 40 MG/1
40 TABLET ORAL DAILY
Status: DISCONTINUED | OUTPATIENT
Start: 2020-01-01 | End: 2020-01-01 | Stop reason: HOSPADM

## 2020-01-01 RX ORDER — DEXAMETHASONE SODIUM PHOSPHATE 4 MG/ML
4 INJECTION, SOLUTION INTRA-ARTICULAR; INTRALESIONAL; INTRAMUSCULAR; INTRAVENOUS; SOFT TISSUE EVERY 8 HOURS SCHEDULED
Status: DISCONTINUED | OUTPATIENT
Start: 2020-01-01 | End: 2020-01-01

## 2020-01-01 RX ORDER — DIGOXIN 0.25 MG/ML
500 INJECTION INTRAMUSCULAR; INTRAVENOUS ONCE
Status: COMPLETED | OUTPATIENT
Start: 2020-01-01 | End: 2020-01-01

## 2020-01-01 RX ORDER — SODIUM CHLORIDE 0.9 % (FLUSH) 0.9 %
10 SYRINGE (ML) INJECTION EVERY 12 HOURS SCHEDULED
Status: DISCONTINUED | OUTPATIENT
Start: 2020-01-01 | End: 2020-01-01 | Stop reason: HOSPADM

## 2020-01-01 RX ORDER — DIGOXIN 0.25 MG/ML
250 INJECTION INTRAMUSCULAR; INTRAVENOUS EVERY 6 HOURS
Status: DISPENSED | OUTPATIENT
Start: 2020-01-01 | End: 2020-01-01

## 2020-01-01 RX ORDER — PHENYLEPHRINE HCL IN 0.9% NACL 0.5 MG/5ML
SYRINGE (ML) INTRAVENOUS AS NEEDED
Status: DISCONTINUED | OUTPATIENT
Start: 2020-01-01 | End: 2020-01-01 | Stop reason: SURG

## 2020-01-01 RX ORDER — PHENYLEPHRINE HCL IN 0.9% NACL 0.5 MG/5ML
.5-3 SYRINGE (ML) INTRAVENOUS
Status: DISCONTINUED | OUTPATIENT
Start: 2020-01-01 | End: 2020-01-01

## 2020-01-01 RX ORDER — HYDROMORPHONE HCL 110MG/55ML
0.5 PATIENT CONTROLLED ANALGESIA SYRINGE INTRAVENOUS
Status: DISCONTINUED | OUTPATIENT
Start: 2020-01-01 | End: 2020-01-01

## 2020-01-01 RX ORDER — MORPHINE SULFATE 4 MG/ML
1 INJECTION, SOLUTION INTRAMUSCULAR; INTRAVENOUS EVERY 6 HOURS PRN
Status: DISCONTINUED | OUTPATIENT
Start: 2020-01-01 | End: 2020-01-01 | Stop reason: HOSPADM

## 2020-01-01 RX ORDER — SODIUM CHLORIDE 0.9 % (FLUSH) 0.9 %
10 SYRINGE (ML) INJECTION EVERY 12 HOURS SCHEDULED
Status: DISCONTINUED | OUTPATIENT
Start: 2020-01-01 | End: 2020-01-01

## 2020-01-01 RX ORDER — BISACODYL 5 MG/1
5 TABLET, DELAYED RELEASE ORAL DAILY PRN
Status: DISCONTINUED | OUTPATIENT
Start: 2020-01-01 | End: 2020-01-01

## 2020-01-01 RX ORDER — ACETAMINOPHEN 500 MG
500 TABLET ORAL EVERY 6 HOURS PRN
COMMUNITY
End: 2020-01-01

## 2020-01-01 RX ORDER — ETOMIDATE 2 MG/ML
INJECTION INTRAVENOUS
Status: COMPLETED
Start: 2020-01-01 | End: 2020-01-01

## 2020-01-01 RX ORDER — LORAZEPAM 2 MG/ML
2 INJECTION INTRAMUSCULAR
Status: DISCONTINUED | OUTPATIENT
Start: 2020-01-01 | End: 2020-01-01 | Stop reason: HOSPADM

## 2020-01-01 RX ORDER — POTASSIUM CHLORIDE 1.5 G/1.77G
40 POWDER, FOR SOLUTION ORAL AS NEEDED
Status: DISCONTINUED | OUTPATIENT
Start: 2020-01-01 | End: 2020-01-01

## 2020-01-01 RX ORDER — ALPRAZOLAM 0.5 MG/1
0.5 TABLET ORAL 2 TIMES DAILY PRN
COMMUNITY

## 2020-01-01 RX ORDER — HEPARIN SODIUM (PORCINE) LOCK FLUSH IV SOLN 100 UNIT/ML 100 UNIT/ML
500 SOLUTION INTRAVENOUS AS NEEDED
Status: DISCONTINUED | OUTPATIENT
Start: 2020-01-01 | End: 2020-01-01 | Stop reason: HOSPADM

## 2020-01-01 RX ORDER — DIGOXIN 125 MCG
125 TABLET ORAL
Status: DISCONTINUED | OUTPATIENT
Start: 2020-01-01 | End: 2020-01-01

## 2020-01-01 RX ORDER — POTASSIUM CHLORIDE 20 MEQ/1
40 TABLET, EXTENDED RELEASE ORAL AS NEEDED
Status: DISCONTINUED | OUTPATIENT
Start: 2020-01-01 | End: 2020-01-01

## 2020-01-01 RX ORDER — LIDOCAINE HYDROCHLORIDE AND EPINEPHRINE 10; 10 MG/ML; UG/ML
INJECTION, SOLUTION INFILTRATION; PERINEURAL AS NEEDED
Status: DISCONTINUED | OUTPATIENT
Start: 2020-01-01 | End: 2020-01-01 | Stop reason: HOSPADM

## 2020-01-01 RX ORDER — LIDOCAINE HYDROCHLORIDE 20 MG/ML
INJECTION, SOLUTION INTRAVENOUS
Status: DISPENSED
Start: 2020-01-01 | End: 2020-01-01

## 2020-01-01 RX ORDER — ONDANSETRON 2 MG/ML
4 INJECTION INTRAMUSCULAR; INTRAVENOUS EVERY 6 HOURS PRN
Status: DISCONTINUED | OUTPATIENT
Start: 2020-01-01 | End: 2020-01-01

## 2020-01-01 RX ORDER — ACETAMINOPHEN 650 MG/1
SUPPOSITORY RECTAL
Status: COMPLETED
Start: 2020-01-01 | End: 2020-01-01

## 2020-01-01 RX ORDER — METOPROLOL TARTRATE 5 MG/5ML
INJECTION INTRAVENOUS AS NEEDED
Status: DISCONTINUED | OUTPATIENT
Start: 2020-01-01 | End: 2020-01-01 | Stop reason: SURG

## 2020-01-01 RX ORDER — CARBOXYMETHYLCELLULOSE SODIUM 10 MG/ML
1 GEL OPHTHALMIC
Status: DISCONTINUED | OUTPATIENT
Start: 2020-01-01 | End: 2020-01-01 | Stop reason: HOSPADM

## 2020-01-01 RX ORDER — DEXAMETHASONE 4 MG/1
TABLET ORAL
Qty: 60 TABLET | Refills: 0 | Status: SHIPPED | OUTPATIENT
Start: 2020-01-01 | End: 2020-01-01

## 2020-01-01 RX ORDER — LEVOFLOXACIN 750 MG/1
750 TABLET ORAL DAILY
COMMUNITY
End: 2020-01-01 | Stop reason: HOSPADM

## 2020-01-01 RX ORDER — NICOTINE POLACRILEX 4 MG
15 LOZENGE BUCCAL
Status: DISCONTINUED | OUTPATIENT
Start: 2020-01-01 | End: 2020-01-01 | Stop reason: HOSPADM

## 2020-01-01 RX ORDER — AMIODARONE HCL/D5W 450 MG/250
0.5 PLASTIC BAG, INJECTION (ML) INTRAVENOUS CONTINUOUS
Status: DISCONTINUED | OUTPATIENT
Start: 2020-01-01 | End: 2020-01-01

## 2020-01-01 RX ORDER — CEFAZOLIN SODIUM IN 0.9 % NACL 3 G/100 ML
3 INTRAVENOUS SOLUTION, PIGGYBACK (ML) INTRAVENOUS ONCE
Status: CANCELLED | OUTPATIENT
Start: 2020-01-01 | End: 2020-01-01

## 2020-01-01 RX ORDER — TRIAMTERENE AND HYDROCHLOROTHIAZIDE 75; 50 MG/1; MG/1
0.5 TABLET ORAL DAILY
Start: 2020-01-01 | End: 2020-01-01

## 2020-01-01 RX ORDER — DIGOXIN 125 MCG
125 TABLET ORAL
Start: 2020-01-01

## 2020-01-01 RX ORDER — FENTANYL CITRATE 50 UG/ML
INJECTION, SOLUTION INTRAMUSCULAR; INTRAVENOUS AS NEEDED
Status: DISCONTINUED | OUTPATIENT
Start: 2020-01-01 | End: 2020-01-01 | Stop reason: SURG

## 2020-01-01 RX ORDER — KETOROLAC TROMETHAMINE 15 MG/ML
15 INJECTION, SOLUTION INTRAMUSCULAR; INTRAVENOUS EVERY 8 HOURS
Status: COMPLETED | OUTPATIENT
Start: 2020-01-01 | End: 2020-01-01

## 2020-01-01 RX ORDER — DEXAMETHASONE 2 MG/1
4 TABLET ORAL
COMMUNITY

## 2020-01-01 RX ORDER — IPRATROPIUM BROMIDE AND ALBUTEROL SULFATE 2.5; .5 MG/3ML; MG/3ML
3 SOLUTION RESPIRATORY (INHALATION)
Status: DISCONTINUED | OUTPATIENT
Start: 2020-01-01 | End: 2020-01-01 | Stop reason: HOSPADM

## 2020-01-01 RX ORDER — LIDOCAINE HYDROCHLORIDE 20 MG/ML
INJECTION, SOLUTION EPIDURAL; INFILTRATION; INTRACAUDAL; PERINEURAL AS NEEDED
Status: DISCONTINUED | OUTPATIENT
Start: 2020-01-01 | End: 2020-01-01

## 2020-01-01 RX ORDER — ROCURONIUM BROMIDE 10 MG/ML
INJECTION, SOLUTION INTRAVENOUS AS NEEDED
Status: DISCONTINUED | OUTPATIENT
Start: 2020-01-01 | End: 2020-01-01 | Stop reason: SURG

## 2020-01-01 RX ORDER — ACETAMINOPHEN 500 MG
500 TABLET ORAL EVERY 6 HOURS PRN
Status: DISCONTINUED | OUTPATIENT
Start: 2020-01-01 | End: 2020-01-01 | Stop reason: HOSPADM

## 2020-01-01 RX ORDER — METOPROLOL SUCCINATE 50 MG/1
50 TABLET, EXTENDED RELEASE ORAL
Status: DISCONTINUED | OUTPATIENT
Start: 2020-01-01 | End: 2020-01-01 | Stop reason: HOSPADM

## 2020-01-01 RX ORDER — MIDAZOLAM HYDROCHLORIDE 1 MG/ML
INJECTION INTRAMUSCULAR; INTRAVENOUS
Status: COMPLETED
Start: 2020-01-01 | End: 2020-01-01

## 2020-01-01 RX ORDER — NALOXONE HCL 0.4 MG/ML
0.4 VIAL (ML) INJECTION
Status: DISCONTINUED | OUTPATIENT
Start: 2020-01-01 | End: 2020-01-01 | Stop reason: HOSPADM

## 2020-01-01 RX ORDER — OXYCODONE HYDROCHLORIDE 5 MG/1
5 TABLET ORAL EVERY 4 HOURS PRN
Status: DISCONTINUED | OUTPATIENT
Start: 2020-01-01 | End: 2020-01-01

## 2020-01-01 RX ORDER — ONDANSETRON 2 MG/ML
4 INJECTION INTRAMUSCULAR; INTRAVENOUS ONCE AS NEEDED
Status: DISCONTINUED | OUTPATIENT
Start: 2020-01-01 | End: 2020-01-01 | Stop reason: HOSPADM

## 2020-01-01 RX ORDER — DEXAMETHASONE 4 MG/1
2 TABLET ORAL EVERY 12 HOURS SCHEDULED
Status: DISCONTINUED | OUTPATIENT
Start: 2020-01-01 | End: 2020-01-01 | Stop reason: HOSPADM

## 2020-01-01 RX ORDER — ONDANSETRON HYDROCHLORIDE 8 MG/1
TABLET, FILM COATED ORAL EVERY 8 HOURS PRN
COMMUNITY
End: 2020-01-01

## 2020-01-01 RX ORDER — POTASSIUM CHLORIDE 7.45 MG/ML
10 INJECTION INTRAVENOUS
Status: DISCONTINUED | OUTPATIENT
Start: 2020-01-01 | End: 2020-01-01

## 2020-01-01 RX ORDER — MAGNESIUM SULFATE 1 G/100ML
1 INJECTION INTRAVENOUS AS NEEDED
Status: DISCONTINUED | OUTPATIENT
Start: 2020-01-01 | End: 2020-01-01

## 2020-01-01 RX ORDER — LIDOCAINE HYDROCHLORIDE 10 MG/ML
INJECTION, SOLUTION INFILTRATION; PERINEURAL
Status: COMPLETED
Start: 2020-01-01 | End: 2020-01-01

## 2020-01-01 RX ORDER — NOREPINEPHRINE BIT/0.9 % NACL 8 MG/250ML
INFUSION BOTTLE (ML) INTRAVENOUS
Status: COMPLETED
Start: 2020-01-01 | End: 2020-01-01

## 2020-01-01 RX ORDER — ACETAMINOPHEN 650 MG/1
650 SUPPOSITORY RECTAL EVERY 4 HOURS PRN
Status: DISCONTINUED | OUTPATIENT
Start: 2020-01-01 | End: 2020-01-01

## 2020-01-01 RX ORDER — GLYCOPYRROLATE 0.2 MG/ML
INJECTION INTRAMUSCULAR; INTRAVENOUS AS NEEDED
Status: DISCONTINUED | OUTPATIENT
Start: 2020-01-01 | End: 2020-01-01 | Stop reason: SURG

## 2020-01-01 RX ORDER — DEXTROSE MONOHYDRATE 25 G/50ML
25 INJECTION, SOLUTION INTRAVENOUS
Status: DISCONTINUED | OUTPATIENT
Start: 2020-01-01 | End: 2020-01-01 | Stop reason: HOSPADM

## 2020-01-01 RX ORDER — DEXTROSE MONOHYDRATE 50 MG/ML
100 INJECTION, SOLUTION INTRAVENOUS CONTINUOUS
Status: DISCONTINUED | OUTPATIENT
Start: 2020-01-01 | End: 2020-01-01

## 2020-01-01 RX ORDER — LANOLIN ALCOHOL/MO/W.PET/CERES
1000 CREAM (GRAM) TOPICAL DAILY
COMMUNITY

## 2020-01-01 RX ORDER — LEVETIRACETAM 5 MG/ML
500 INJECTION INTRAVASCULAR EVERY 12 HOURS SCHEDULED
Status: DISCONTINUED | OUTPATIENT
Start: 2020-01-01 | End: 2020-01-01

## 2020-01-01 RX ORDER — MIDAZOLAM HYDROCHLORIDE 1 MG/ML
INJECTION INTRAMUSCULAR; INTRAVENOUS
Status: DISPENSED
Start: 2020-01-01 | End: 2020-01-01

## 2020-01-01 RX ORDER — FENTANYL CITRATE 50 UG/ML
50 INJECTION, SOLUTION INTRAMUSCULAR; INTRAVENOUS ONCE
Status: DISCONTINUED | OUTPATIENT
Start: 2020-01-01 | End: 2020-01-01

## 2020-01-01 RX ORDER — MIDAZOLAM HYDROCHLORIDE 1 MG/ML
2 INJECTION INTRAMUSCULAR; INTRAVENOUS ONCE
Status: DISCONTINUED | OUTPATIENT
Start: 2020-01-01 | End: 2020-01-01

## 2020-01-01 RX ORDER — MIDAZOLAM HYDROCHLORIDE 1 MG/ML
INJECTION INTRAMUSCULAR; INTRAVENOUS AS NEEDED
Status: DISCONTINUED | OUTPATIENT
Start: 2020-01-01 | End: 2020-01-01 | Stop reason: HOSPADM

## 2020-01-01 RX ORDER — SODIUM CHLORIDE 9 MG/ML
75 INJECTION, SOLUTION INTRAVENOUS CONTINUOUS
Status: DISCONTINUED | OUTPATIENT
Start: 2020-01-01 | End: 2020-01-01

## 2020-01-01 RX ORDER — PANTOPRAZOLE SODIUM 40 MG/1
40 TABLET, DELAYED RELEASE ORAL
Status: DISCONTINUED | OUTPATIENT
Start: 2020-01-01 | End: 2020-01-01

## 2020-01-01 RX ORDER — DOXYCYCLINE HYCLATE 100 MG/1
100 CAPSULE ORAL 2 TIMES DAILY
COMMUNITY
End: 2020-01-01

## 2020-01-01 RX ORDER — ALUMINA, MAGNESIA, AND SIMETHICONE 2400; 2400; 240 MG/30ML; MG/30ML; MG/30ML
15 SUSPENSION ORAL EVERY 6 HOURS PRN
Status: DISCONTINUED | OUTPATIENT
Start: 2020-01-01 | End: 2020-01-01 | Stop reason: HOSPADM

## 2020-01-01 RX ORDER — FLUTICASONE PROPIONATE 50 MCG
2 SPRAY, SUSPENSION (ML) NASAL DAILY
Start: 2020-01-01

## 2020-01-01 RX ORDER — FUROSEMIDE 10 MG/ML
40 INJECTION INTRAMUSCULAR; INTRAVENOUS ONCE
Status: COMPLETED | OUTPATIENT
Start: 2020-01-01 | End: 2020-01-01

## 2020-01-01 RX ORDER — KETOROLAC TROMETHAMINE 15 MG/ML
15 INJECTION, SOLUTION INTRAMUSCULAR; INTRAVENOUS EVERY 8 HOURS
Status: DISCONTINUED | OUTPATIENT
Start: 2020-01-01 | End: 2020-01-01

## 2020-01-01 RX ORDER — HYDROCODONE BITARTRATE AND ACETAMINOPHEN 7.5; 325 MG/1; MG/1
1 TABLET ORAL EVERY 4 HOURS PRN
Status: DISCONTINUED | OUTPATIENT
Start: 2020-01-01 | End: 2020-01-01 | Stop reason: HOSPADM

## 2020-01-01 RX ORDER — KETOCONAZOLE 20 MG/G
CREAM TOPICAL DAILY
COMMUNITY
End: 2020-01-01

## 2020-01-01 RX ORDER — MAGNESIUM SULFATE HEPTAHYDRATE 40 MG/ML
2 INJECTION, SOLUTION INTRAVENOUS AS NEEDED
Status: DISCONTINUED | OUTPATIENT
Start: 2020-01-01 | End: 2020-01-01

## 2020-01-01 RX ORDER — OXYCODONE HYDROCHLORIDE AND ACETAMINOPHEN 5; 325 MG/1; MG/1
1 TABLET ORAL EVERY 4 HOURS PRN
Qty: 15 TABLET | Refills: 0 | Status: SHIPPED | OUTPATIENT
Start: 2020-01-01 | End: 2020-01-01

## 2020-01-01 RX ORDER — FAMOTIDINE 10 MG/ML
20 INJECTION, SOLUTION INTRAVENOUS EVERY 12 HOURS SCHEDULED
Status: DISCONTINUED | OUTPATIENT
Start: 2020-01-01 | End: 2020-01-01

## 2020-01-01 RX ORDER — SODIUM CHLORIDE, SODIUM LACTATE, POTASSIUM CHLORIDE, CALCIUM CHLORIDE 600; 310; 30; 20 MG/100ML; MG/100ML; MG/100ML; MG/100ML
INJECTION, SOLUTION INTRAVENOUS CONTINUOUS PRN
Status: DISCONTINUED | OUTPATIENT
Start: 2020-01-01 | End: 2020-01-01 | Stop reason: SURG

## 2020-01-01 RX ORDER — SODIUM CHLORIDE 0.9 % (FLUSH) 0.9 %
3 SYRINGE (ML) INJECTION EVERY 12 HOURS SCHEDULED
Status: CANCELLED | OUTPATIENT
Start: 2020-01-01

## 2020-01-01 RX ORDER — GINSENG 100 MG
CAPSULE ORAL AS NEEDED
Status: DISCONTINUED | OUTPATIENT
Start: 2020-01-01 | End: 2020-01-01 | Stop reason: HOSPADM

## 2020-01-01 RX ORDER — LIDOCAINE HYDROCHLORIDE AND EPINEPHRINE BITARTRATE 20; .01 MG/ML; MG/ML
INJECTION, SOLUTION SUBCUTANEOUS AS NEEDED
Status: DISCONTINUED | OUTPATIENT
Start: 2020-01-01 | End: 2020-01-01 | Stop reason: HOSPADM

## 2020-01-01 RX ORDER — MELOXICAM 15 MG/1
15 TABLET ORAL DAILY
Qty: 30 TABLET | Refills: 1 | OUTPATIENT
Start: 2020-01-01 | End: 2020-01-01

## 2020-01-01 RX ORDER — HYDRALAZINE HYDROCHLORIDE 20 MG/ML
INJECTION INTRAMUSCULAR; INTRAVENOUS AS NEEDED
Status: DISCONTINUED | OUTPATIENT
Start: 2020-01-01 | End: 2020-01-01 | Stop reason: SURG

## 2020-01-01 RX ORDER — ZINC OXIDE 20 %
OINTMENT (GRAM) TOPICAL AS NEEDED
COMMUNITY
End: 2020-01-01

## 2020-01-01 RX ORDER — DEXAMETHASONE 4 MG/1
4 TABLET ORAL 2 TIMES DAILY WITH MEALS
Status: DISCONTINUED | OUTPATIENT
Start: 2020-01-01 | End: 2020-01-01

## 2020-01-01 RX ORDER — LIDOCAINE 50 MG/G
OINTMENT TOPICAL
Status: DISPENSED
Start: 2020-01-01 | End: 2020-01-01

## 2020-01-01 RX ORDER — DEXAMETHASONE 4 MG/1
6 TABLET ORAL EVERY 12 HOURS SCHEDULED
Status: DISCONTINUED | OUTPATIENT
Start: 2020-01-01 | End: 2020-01-01 | Stop reason: HOSPADM

## 2020-01-01 RX ORDER — GLYCOPYRROLATE 1 MG/5 ML
SYRINGE (ML) INTRAVENOUS AS NEEDED
Status: DISCONTINUED | OUTPATIENT
Start: 2020-01-01 | End: 2020-01-01 | Stop reason: SURG

## 2020-01-01 RX ORDER — DEXAMETHASONE 0.5 MG/1
2 TABLET ORAL EVERY 12 HOURS SCHEDULED
Status: DISCONTINUED | OUTPATIENT
Start: 2020-01-01 | End: 2020-01-01

## 2020-01-01 RX ORDER — POTASSIUM CHLORIDE 750 MG/1
30 TABLET, FILM COATED, EXTENDED RELEASE ORAL DAILY
COMMUNITY

## 2020-01-01 RX ORDER — TRAMADOL HYDROCHLORIDE 50 MG/1
50 TABLET ORAL EVERY 8 HOURS PRN
Status: DISCONTINUED | OUTPATIENT
Start: 2020-01-01 | End: 2020-01-01 | Stop reason: HOSPADM

## 2020-01-01 RX ORDER — HEPARIN SODIUM 200 [USP'U]/100ML
INJECTION, SOLUTION INTRAVENOUS
Status: COMPLETED | OUTPATIENT
Start: 2020-01-01 | End: 2020-01-01

## 2020-01-01 RX ORDER — OXYCODONE HYDROCHLORIDE 5 MG/1
5 TABLET ORAL
Status: DISCONTINUED | OUTPATIENT
Start: 2020-01-01 | End: 2020-01-01 | Stop reason: HOSPADM

## 2020-01-01 RX ORDER — AMIODARONE HCL/D5W 450 MG/250
0.5 PLASTIC BAG, INJECTION (ML) INTRAVENOUS CONTINUOUS
Status: DISPENSED | OUTPATIENT
Start: 2020-01-01 | End: 2020-01-01

## 2020-01-01 RX ORDER — ESCITALOPRAM OXALATE 10 MG/1
10 TABLET ORAL DAILY
COMMUNITY

## 2020-01-01 RX ORDER — PANTOPRAZOLE SODIUM 40 MG/1
40 TABLET, DELAYED RELEASE ORAL EVERY MORNING
Status: DISCONTINUED | OUTPATIENT
Start: 2020-01-01 | End: 2020-01-01

## 2020-01-01 RX ORDER — MELATONIN
2000 DAILY
Status: DISCONTINUED | OUTPATIENT
Start: 2020-01-01 | End: 2020-01-01 | Stop reason: HOSPADM

## 2020-01-01 RX ADMIN — Medication 3 ML: at 20:17

## 2020-01-01 RX ADMIN — GADOTERIDOL 20 ML: 279.3 INJECTION, SOLUTION INTRAVENOUS at 11:11

## 2020-01-01 RX ADMIN — Medication 10 ML: at 08:59

## 2020-01-01 RX ADMIN — ALLOPURINOL 200 MG: 100 TABLET ORAL at 10:17

## 2020-01-01 RX ADMIN — DEXAMETHASONE 4 MG: 4 TABLET ORAL at 09:24

## 2020-01-01 RX ADMIN — SUCRALFATE 1 G: 1 TABLET ORAL at 09:57

## 2020-01-01 RX ADMIN — OXYCODONE HYDROCHLORIDE 5 MG: 5 TABLET ORAL at 07:11

## 2020-01-01 RX ADMIN — OXYCODONE HYDROCHLORIDE 5 MG: 5 TABLET ORAL at 20:21

## 2020-01-01 RX ADMIN — MORPHINE SULFATE 1 MG: 4 INJECTION INTRAVENOUS at 16:04

## 2020-01-01 RX ADMIN — PANTOPRAZOLE SODIUM 40 MG: 40 TABLET, DELAYED RELEASE ORAL at 20:45

## 2020-01-01 RX ADMIN — HYDROMORPHONE HYDROCHLORIDE 0.5 MG: 2 INJECTION, SOLUTION INTRAMUSCULAR; INTRAVENOUS; SUBCUTANEOUS at 23:35

## 2020-01-01 RX ADMIN — METOPROLOL TARTRATE 25 MG: 25 TABLET ORAL at 20:23

## 2020-01-01 RX ADMIN — METOPROLOL TARTRATE 25 MG: 25 TABLET ORAL at 09:00

## 2020-01-01 RX ADMIN — ALBUTEROL SULFATE 2.5 MG: 2.5 SOLUTION RESPIRATORY (INHALATION) at 08:04

## 2020-01-01 RX ADMIN — CYANOCOBALAMIN TAB 1000 MCG 1000 MCG: 1000 TAB at 08:01

## 2020-01-01 RX ADMIN — CHLORHEXIDINE GLUCONATE 0.12% ORAL RINSE 15 ML: 1.2 LIQUID ORAL at 21:48

## 2020-01-01 RX ADMIN — MIDAZOLAM HYDROCHLORIDE 2 MG: 1 INJECTION, SOLUTION INTRAMUSCULAR; INTRAVENOUS at 19:04

## 2020-01-01 RX ADMIN — PANTOPRAZOLE SODIUM 40 MG: 40 TABLET, DELAYED RELEASE ORAL at 20:22

## 2020-01-01 RX ADMIN — ENOXAPARIN SODIUM 110 MG: 120 INJECTION SUBCUTANEOUS at 08:12

## 2020-01-01 RX ADMIN — METOPROLOL TARTRATE 25 MG: 25 TABLET ORAL at 07:45

## 2020-01-01 RX ADMIN — CEFEPIME HYDROCHLORIDE 2 G: 2 INJECTION, POWDER, FOR SOLUTION INTRAVENOUS at 18:00

## 2020-01-01 RX ADMIN — BEVACIZUMAB-AWWB 1100 MG: 400 INJECTION, SOLUTION INTRAVENOUS at 13:37

## 2020-01-01 RX ADMIN — ENOXAPARIN SODIUM 110 MG: 120 INJECTION SUBCUTANEOUS at 21:51

## 2020-01-01 RX ADMIN — Medication 10 ML: at 21:17

## 2020-01-01 RX ADMIN — TRIAMTERENE AND HYDROCHLOROTHIAZIDE 1 TABLET: 75; 50 TABLET ORAL at 08:41

## 2020-01-01 RX ADMIN — Medication 1.2 MCG/KG/MIN: at 09:16

## 2020-01-01 RX ADMIN — ATORVASTATIN CALCIUM 20 MG: 20 TABLET, FILM COATED ORAL at 20:23

## 2020-01-01 RX ADMIN — FUROSEMIDE 40 MG: 40 TABLET ORAL at 08:34

## 2020-01-01 RX ADMIN — PROPOFOL 20 MCG/KG/MIN: 10 INJECTION, EMULSION INTRAVENOUS at 19:20

## 2020-01-01 RX ADMIN — PIPERACILLIN AND TAZOBACTAM 3.38 G: 3; .375 INJECTION, POWDER, FOR SOLUTION INTRAVENOUS at 14:46

## 2020-01-01 RX ADMIN — FLUTICASONE PROPIONATE 1 SPRAY: 50 SPRAY, METERED NASAL at 11:46

## 2020-01-01 RX ADMIN — FLUTICASONE PROPIONATE 1 SPRAY: 50 SPRAY, METERED NASAL at 08:17

## 2020-01-01 RX ADMIN — LEVETIRACETAM 500 MG: 5 INJECTION INTRAVENOUS at 09:24

## 2020-01-01 RX ADMIN — DILTIAZEM HYDROCHLORIDE 30 MG: 30 TABLET, FILM COATED ORAL at 05:47

## 2020-01-01 RX ADMIN — AMIODARONE HYDROCHLORIDE 0.5 MG/MIN: 50 INJECTION, SOLUTION INTRAVENOUS at 00:24

## 2020-01-01 RX ADMIN — ROCURONIUM BROMIDE 50 MG: 10 INJECTION, SOLUTION INTRAVENOUS at 14:00

## 2020-01-01 RX ADMIN — PANTOPRAZOLE SODIUM 40 MG: 40 TABLET, DELAYED RELEASE ORAL at 06:03

## 2020-01-01 RX ADMIN — FUROSEMIDE 40 MG: 40 TABLET ORAL at 09:58

## 2020-01-01 RX ADMIN — Medication 10 ML: at 10:18

## 2020-01-01 RX ADMIN — DIGOXIN 125 MCG: 0.12 TABLET ORAL at 11:14

## 2020-01-01 RX ADMIN — Medication 10 ML: at 23:57

## 2020-01-01 RX ADMIN — HYDROMORPHONE HYDROCHLORIDE 0.5 MG: 2 INJECTION, SOLUTION INTRAMUSCULAR; INTRAVENOUS; SUBCUTANEOUS at 17:16

## 2020-01-01 RX ADMIN — HEPARIN SODIUM AND DEXTROSE 14.21 UNITS/KG/HR: 10000; 5 INJECTION INTRAVENOUS at 20:43

## 2020-01-01 RX ADMIN — CEFEPIME HYDROCHLORIDE 2 G: 2 INJECTION, POWDER, FOR SOLUTION INTRAVENOUS at 01:57

## 2020-01-01 RX ADMIN — FENTANYL CITRATE 50 MCG: 50 INJECTION, SOLUTION INTRAMUSCULAR; INTRAVENOUS at 19:04

## 2020-01-01 RX ADMIN — Medication 10 ML: at 08:24

## 2020-01-01 RX ADMIN — METOPROLOL TARTRATE 2.5 MG: 5 INJECTION INTRAVENOUS at 15:37

## 2020-01-01 RX ADMIN — ATORVASTATIN CALCIUM 20 MG: 20 TABLET, FILM COATED ORAL at 20:16

## 2020-01-01 RX ADMIN — Medication 1.4 MCG/KG/MIN: at 18:22

## 2020-01-01 RX ADMIN — LEVETIRACETAM 500 MG: 5 INJECTION INTRAVENOUS at 21:08

## 2020-01-01 RX ADMIN — FLUTICASONE PROPIONATE 1 SPRAY: 50 SPRAY, METERED NASAL at 20:35

## 2020-01-01 RX ADMIN — PANTOPRAZOLE SODIUM 40 MG: 40 TABLET, DELAYED RELEASE ORAL at 07:00

## 2020-01-01 RX ADMIN — SODIUM CHLORIDE 250 ML: 900 INJECTION, SOLUTION INTRAVENOUS at 13:40

## 2020-01-01 RX ADMIN — HYDROCODONE BITARTRATE AND ACETAMINOPHEN 1 TABLET: 5; 325 TABLET ORAL at 00:57

## 2020-01-01 RX ADMIN — LEVETIRACETAM INJECTION 500 MG: 5 INJECTION INTRAVENOUS at 08:17

## 2020-01-01 RX ADMIN — FLUTICASONE PROPIONATE 1 SPRAY: 50 SPRAY, METERED NASAL at 08:25

## 2020-01-01 RX ADMIN — HYDROCODONE BITARTRATE AND ACETAMINOPHEN 1 TABLET: 5; 325 TABLET ORAL at 20:35

## 2020-01-01 RX ADMIN — BUMETANIDE 1 MG: 0.25 INJECTION INTRAMUSCULAR; INTRAVENOUS at 23:31

## 2020-01-01 RX ADMIN — Medication 10 ML: at 21:19

## 2020-01-01 RX ADMIN — Medication 1.2 MCG/KG/MIN: at 04:28

## 2020-01-01 RX ADMIN — ATORVASTATIN CALCIUM 20 MG: 20 TABLET, FILM COATED ORAL at 20:59

## 2020-01-01 RX ADMIN — BEVACIZUMAB-AWWB 1200 MG: 400 INJECTION, SOLUTION INTRAVENOUS at 14:35

## 2020-01-01 RX ADMIN — PHENYLEPHRINE HYDROCHLORIDE 100 MCG: 10 INJECTION INTRAVENOUS at 14:46

## 2020-01-01 RX ADMIN — DILTIAZEM HYDROCHLORIDE 30 MG: 30 TABLET, FILM COATED ORAL at 11:51

## 2020-01-01 RX ADMIN — KETOROLAC TROMETHAMINE 15 MG: 15 INJECTION, SOLUTION INTRAMUSCULAR; INTRAVENOUS at 19:18

## 2020-01-01 RX ADMIN — VANCOMYCIN HYDROCHLORIDE 1500 MG: 10 INJECTION, POWDER, LYOPHILIZED, FOR SOLUTION INTRAVENOUS at 06:21

## 2020-01-01 RX ADMIN — FUROSEMIDE 40 MG: 40 TABLET ORAL at 08:25

## 2020-01-01 RX ADMIN — Medication 10 ML: at 21:52

## 2020-01-01 RX ADMIN — ACETAMINOPHEN 650 MG: 325 TABLET, FILM COATED ORAL at 20:16

## 2020-01-01 RX ADMIN — Medication 1.2 MCG/KG/MIN: at 14:47

## 2020-01-01 RX ADMIN — DEXAMETHASONE SODIUM PHOSPHATE 4 MG: 4 INJECTION, SOLUTION INTRAMUSCULAR; INTRAVENOUS at 14:46

## 2020-01-01 RX ADMIN — DEXAMETHASONE 2 MG: 0.5 TABLET ORAL at 08:24

## 2020-01-01 RX ADMIN — DEXAMETHASONE SODIUM PHOSPHATE 10 MG: 4 INJECTION, SOLUTION INTRAMUSCULAR; INTRAVENOUS at 09:58

## 2020-01-01 RX ADMIN — KETOROLAC TROMETHAMINE 15 MG: 15 INJECTION, SOLUTION INTRAMUSCULAR; INTRAVENOUS at 11:32

## 2020-01-01 RX ADMIN — MORPHINE SULFATE 4 MG: 4 INJECTION INTRAVENOUS at 15:40

## 2020-01-01 RX ADMIN — HYDROMORPHONE HYDROCHLORIDE 0.5 MG: 2 INJECTION, SOLUTION INTRAMUSCULAR; INTRAVENOUS; SUBCUTANEOUS at 16:36

## 2020-01-01 RX ADMIN — METOPROLOL TARTRATE 25 MG: 25 TABLET ORAL at 08:58

## 2020-01-01 RX ADMIN — Medication 10 ML: at 20:55

## 2020-01-01 RX ADMIN — MORPHINE SULFATE 1 MG: 4 INJECTION INTRAVENOUS at 07:37

## 2020-01-01 RX ADMIN — Medication 10 ML: at 20:45

## 2020-01-01 RX ADMIN — LEVETIRACETAM 1000 MG: 10 INJECTION INTRAVENOUS at 12:37

## 2020-01-01 RX ADMIN — ALLOPURINOL 200 MG: 100 TABLET ORAL at 08:24

## 2020-01-01 RX ADMIN — SUCRALFATE 1 G: 1 TABLET ORAL at 08:03

## 2020-01-01 RX ADMIN — OXYCODONE HYDROCHLORIDE 5 MG: 5 TABLET ORAL at 15:43

## 2020-01-01 RX ADMIN — HYDRALAZINE HYDROCHLORIDE 10 MG: 20 INJECTION INTRAMUSCULAR; INTRAVENOUS at 14:37

## 2020-01-01 RX ADMIN — ALLOPURINOL 200 MG: 100 TABLET ORAL at 08:59

## 2020-01-01 RX ADMIN — HYDROCODONE BITARTRATE AND ACETAMINOPHEN 1 TABLET: 7.5; 325 TABLET ORAL at 06:21

## 2020-01-01 RX ADMIN — INSULIN LISPRO 2 UNITS: 100 INJECTION, SOLUTION INTRAVENOUS; SUBCUTANEOUS at 20:40

## 2020-01-01 RX ADMIN — APIXABAN 5 MG: 5 TABLET, FILM COATED ORAL at 20:53

## 2020-01-01 RX ADMIN — PANTOPRAZOLE SODIUM 40 MG: 40 INJECTION, POWDER, FOR SOLUTION INTRAVENOUS at 05:51

## 2020-01-01 RX ADMIN — DILTIAZEM HYDROCHLORIDE 10 MG: 5 INJECTION INTRAVENOUS at 19:55

## 2020-01-01 RX ADMIN — DEXAMETHASONE 6 MG: 0.5 TABLET ORAL at 20:54

## 2020-01-01 RX ADMIN — TRIAMTERENE AND HYDROCHLOROTHIAZIDE 1 TABLET: 75; 50 TABLET ORAL at 10:17

## 2020-01-01 RX ADMIN — SUCRALFATE 1 G: 1 TABLET ORAL at 06:31

## 2020-01-01 RX ADMIN — DEXAMETHASONE SODIUM PHOSPHATE 4 MG: 4 INJECTION, SOLUTION INTRAMUSCULAR; INTRAVENOUS at 05:54

## 2020-01-01 RX ADMIN — ACETAMINOPHEN 650 MG: 325 TABLET, FILM COATED ORAL at 07:46

## 2020-01-01 RX ADMIN — PIPERACILLIN AND TAZOBACTAM 3.38 G: 3; .375 INJECTION, POWDER, FOR SOLUTION INTRAVENOUS at 06:05

## 2020-01-01 RX ADMIN — DEXAMETHASONE SODIUM PHOSPHATE 4 MG: 4 INJECTION, SOLUTION INTRAMUSCULAR; INTRAVENOUS at 05:07

## 2020-01-01 RX ADMIN — HYDROCODONE BITARTRATE AND ACETAMINOPHEN 1 TABLET: 5; 325 TABLET ORAL at 15:30

## 2020-01-01 RX ADMIN — LEVETIRACETAM 1000 MG: 10 INJECTION INTRAVENOUS at 21:45

## 2020-01-01 RX ADMIN — CHLORHEXIDINE GLUCONATE 0.12% ORAL RINSE 15 ML: 1.2 LIQUID ORAL at 09:06

## 2020-01-01 RX ADMIN — OXYCODONE HYDROCHLORIDE 5 MG: 5 TABLET ORAL at 22:06

## 2020-01-01 RX ADMIN — SODIUM BICARBONATE: 84 INJECTION, SOLUTION INTRAVENOUS at 09:29

## 2020-01-01 RX ADMIN — DEXAMETHASONE 2 MG: 0.5 TABLET ORAL at 07:45

## 2020-01-01 RX ADMIN — SODIUM CHLORIDE 100 ML/HR: 900 INJECTION, SOLUTION INTRAVENOUS at 12:12

## 2020-01-01 RX ADMIN — PIPERACILLIN AND TAZOBACTAM 3.38 G: 3; .375 INJECTION, POWDER, FOR SOLUTION INTRAVENOUS at 00:11

## 2020-01-01 RX ADMIN — PANTOPRAZOLE SODIUM 40 MG: 40 INJECTION, POWDER, FOR SOLUTION INTRAVENOUS at 06:53

## 2020-01-01 RX ADMIN — HEPARIN 500 UNITS: 100 SYRINGE at 14:23

## 2020-01-01 RX ADMIN — CEFAZOLIN SODIUM 2 G: 10 INJECTION, POWDER, FOR SOLUTION INTRAVENOUS at 21:38

## 2020-01-01 RX ADMIN — BISACODYL 5 MG: 5 TABLET, COATED ORAL at 23:02

## 2020-01-01 RX ADMIN — DEXAMETHASONE 2 MG: 0.5 TABLET ORAL at 17:38

## 2020-01-01 RX ADMIN — METOPROLOL TARTRATE 2.5 MG: 5 INJECTION INTRAVENOUS at 02:19

## 2020-01-01 RX ADMIN — OXYCODONE HYDROCHLORIDE 5 MG: 5 TABLET ORAL at 10:29

## 2020-01-01 RX ADMIN — CEFAZOLIN 3 G: 1 INJECTION, POWDER, FOR SOLUTION INTRAMUSCULAR; INTRAVENOUS; PARENTERAL at 14:40

## 2020-01-01 RX ADMIN — OXYCODONE HYDROCHLORIDE 5 MG: 5 TABLET ORAL at 23:28

## 2020-01-01 RX ADMIN — INSULIN LISPRO 4 UNITS: 100 INJECTION, SOLUTION INTRAVENOUS; SUBCUTANEOUS at 11:58

## 2020-01-01 RX ADMIN — ALLOPURINOL 200 MG: 100 TABLET ORAL at 09:58

## 2020-01-01 RX ADMIN — HEPARIN SODIUM AND DEXTROSE 12.26 UNITS/KG/HR: 10000; 5 INJECTION INTRAVENOUS at 14:14

## 2020-01-01 RX ADMIN — DEXAMETHASONE 6 MG: 0.5 TABLET ORAL at 10:17

## 2020-01-01 RX ADMIN — PANTOPRAZOLE SODIUM 40 MG: 40 TABLET, DELAYED RELEASE ORAL at 20:16

## 2020-01-01 RX ADMIN — CEFAZOLIN SODIUM 2 G: 10 INJECTION, POWDER, FOR SOLUTION INTRAVENOUS at 13:52

## 2020-01-01 RX ADMIN — ACETAMINOPHEN 650 MG: 325 TABLET, FILM COATED ORAL at 10:15

## 2020-01-01 RX ADMIN — DEXAMETHASONE 2 MG: 0.5 TABLET ORAL at 21:01

## 2020-01-01 RX ADMIN — DEXAMETHASONE 2 MG: 0.5 TABLET ORAL at 08:01

## 2020-01-01 RX ADMIN — CYANOCOBALAMIN TAB 1000 MCG 1000 MCG: 1000 TAB at 09:58

## 2020-01-01 RX ADMIN — INSULIN LISPRO 2 UNITS: 100 INJECTION, SOLUTION INTRAVENOUS; SUBCUTANEOUS at 17:03

## 2020-01-01 RX ADMIN — MELATONIN 2000 UNITS: at 10:17

## 2020-01-01 RX ADMIN — Medication 0.01 MCG/KG/MIN: at 20:00

## 2020-01-01 RX ADMIN — DEXAMETHASONE 2 MG: 0.5 TABLET ORAL at 17:03

## 2020-01-01 RX ADMIN — INSULIN LISPRO 2 UNITS: 100 INJECTION, SOLUTION INTRAVENOUS; SUBCUTANEOUS at 22:00

## 2020-01-01 RX ADMIN — DEXAMETHASONE SODIUM PHOSPHATE 4 MG: 4 INJECTION, SOLUTION INTRAMUSCULAR; INTRAVENOUS at 11:05

## 2020-01-01 RX ADMIN — BEVACIZUMAB-AWWB 1200 MG: 400 INJECTION, SOLUTION INTRAVENOUS at 12:32

## 2020-01-01 RX ADMIN — METOPROLOL TARTRATE 25 MG: 25 TABLET ORAL at 20:38

## 2020-01-01 RX ADMIN — ACETAMINOPHEN 500 MG: 500 TABLET, FILM COATED ORAL at 21:16

## 2020-01-01 RX ADMIN — FUROSEMIDE 40 MG: 40 TABLET ORAL at 08:24

## 2020-01-01 RX ADMIN — HYDROCODONE BITARTRATE AND ACETAMINOPHEN 1 TABLET: 5; 325 TABLET ORAL at 15:50

## 2020-01-01 RX ADMIN — ALBUTEROL SULFATE 2.5 MG: 2.5 SOLUTION RESPIRATORY (INHALATION) at 12:15

## 2020-01-01 RX ADMIN — SUCRALFATE 1 G: 1 TABLET ORAL at 20:53

## 2020-01-01 RX ADMIN — SODIUM CHLORIDE 1000 MG: 900 INJECTION, SOLUTION INTRAVENOUS at 15:53

## 2020-01-01 RX ADMIN — APIXABAN 5 MG: 5 TABLET, FILM COATED ORAL at 12:30

## 2020-01-01 RX ADMIN — ALBUTEROL SULFATE 2.5 MG: 2.5 SOLUTION RESPIRATORY (INHALATION) at 14:50

## 2020-01-01 RX ADMIN — MELATONIN 2000 UNITS: at 08:41

## 2020-01-01 RX ADMIN — DEXAMETHASONE 2 MG: 0.5 TABLET ORAL at 22:17

## 2020-01-01 RX ADMIN — INSULIN LISPRO 4 UNITS: 100 INJECTION, SOLUTION INTRAVENOUS; SUBCUTANEOUS at 20:03

## 2020-01-01 RX ADMIN — Medication 3 ML: at 08:26

## 2020-01-01 RX ADMIN — TRAMADOL HYDROCHLORIDE 50 MG: 50 TABLET, FILM COATED ORAL at 16:18

## 2020-01-01 RX ADMIN — DEXAMETHASONE SODIUM PHOSPHATE 4 MG: 4 INJECTION, SOLUTION INTRAMUSCULAR; INTRAVENOUS at 17:41

## 2020-01-01 RX ADMIN — ALBUTEROL SULFATE 2.5 MG: 2.5 SOLUTION RESPIRATORY (INHALATION) at 07:25

## 2020-01-01 RX ADMIN — DEXAMETHASONE 2 MG: 0.5 TABLET ORAL at 17:49

## 2020-01-01 RX ADMIN — INSULIN LISPRO 2 UNITS: 100 INJECTION, SOLUTION INTRAVENOUS; SUBCUTANEOUS at 20:23

## 2020-01-01 RX ADMIN — FUROSEMIDE 40 MG: 40 TABLET ORAL at 10:18

## 2020-01-01 RX ADMIN — ALBUTEROL SULFATE 2.5 MG: 2.5 SOLUTION RESPIRATORY (INHALATION) at 18:58

## 2020-01-01 RX ADMIN — PIPERACILLIN AND TAZOBACTAM 3.38 G: 3; .375 INJECTION, POWDER, FOR SOLUTION INTRAVENOUS at 15:38

## 2020-01-01 RX ADMIN — HYDROMORPHONE HYDROCHLORIDE 0.5 MG: 2 INJECTION, SOLUTION INTRAMUSCULAR; INTRAVENOUS; SUBCUTANEOUS at 03:11

## 2020-01-01 RX ADMIN — PIPERACILLIN AND TAZOBACTAM 3.38 G: 3; .375 INJECTION, POWDER, FOR SOLUTION INTRAVENOUS at 06:24

## 2020-01-01 RX ADMIN — OXYCODONE HYDROCHLORIDE 5 MG: 5 TABLET ORAL at 11:23

## 2020-01-01 RX ADMIN — DEXAMETHASONE SODIUM PHOSPHATE 4 MG: 4 INJECTION, SOLUTION INTRAMUSCULAR; INTRAVENOUS at 11:57

## 2020-01-01 RX ADMIN — ATORVASTATIN CALCIUM 20 MG: 20 TABLET, FILM COATED ORAL at 20:35

## 2020-01-01 RX ADMIN — ATORVASTATIN CALCIUM 20 MG: 20 TABLET, FILM COATED ORAL at 20:11

## 2020-01-01 RX ADMIN — PIPERACILLIN AND TAZOBACTAM 3.38 G: 3; .375 INJECTION, POWDER, FOR SOLUTION INTRAVENOUS at 17:58

## 2020-01-01 RX ADMIN — LEVETIRACETAM 500 MG: 5 INJECTION INTRAVENOUS at 21:48

## 2020-01-01 RX ADMIN — HYDROMORPHONE HYDROCHLORIDE 0.5 MG: 2 INJECTION, SOLUTION INTRAMUSCULAR; INTRAVENOUS; SUBCUTANEOUS at 14:15

## 2020-01-01 RX ADMIN — ALBUTEROL SULFATE 2 PUFF: 90 AEROSOL, METERED RESPIRATORY (INHALATION) at 15:07

## 2020-01-01 RX ADMIN — DEXAMETHASONE SODIUM PHOSPHATE 4 MG: 4 INJECTION, SOLUTION INTRAMUSCULAR; INTRAVENOUS at 21:08

## 2020-01-01 RX ADMIN — FUROSEMIDE 40 MG: 10 INJECTION, SOLUTION INTRAMUSCULAR; INTRAVENOUS at 21:17

## 2020-01-01 RX ADMIN — PIPERACILLIN AND TAZOBACTAM 3.38 G: 3; .375 INJECTION, POWDER, FOR SOLUTION INTRAVENOUS at 23:52

## 2020-01-01 RX ADMIN — MIDAZOLAM HYDROCHLORIDE 2 MG: 1 INJECTION, SOLUTION INTRAMUSCULAR; INTRAVENOUS at 21:09

## 2020-01-01 RX ADMIN — ALBUTEROL SULFATE 2.5 MG: 2.5 SOLUTION RESPIRATORY (INHALATION) at 06:50

## 2020-01-01 RX ADMIN — PROPOFOL 50 MG: 10 INJECTION, EMULSION INTRAVENOUS at 10:53

## 2020-01-01 RX ADMIN — ACETAMINOPHEN 500 MG: 500 TABLET, FILM COATED ORAL at 10:17

## 2020-01-01 RX ADMIN — METOPROLOL TARTRATE 2.5 MG: 5 INJECTION INTRAVENOUS at 21:49

## 2020-01-01 RX ADMIN — PIPERACILLIN AND TAZOBACTAM 3.38 G: 3; .375 INJECTION, POWDER, FOR SOLUTION INTRAVENOUS at 23:56

## 2020-01-01 RX ADMIN — FLUTICASONE PROPIONATE 1 SPRAY: 50 SPRAY, METERED NASAL at 08:59

## 2020-01-01 RX ADMIN — ATORVASTATIN CALCIUM 20 MG: 20 TABLET, FILM COATED ORAL at 21:01

## 2020-01-01 RX ADMIN — Medication 1 MCG/KG/MIN: at 11:57

## 2020-01-01 RX ADMIN — DILTIAZEM HYDROCHLORIDE 30 MG: 30 TABLET, FILM COATED ORAL at 11:10

## 2020-01-01 RX ADMIN — FUROSEMIDE 40 MG: 40 TABLET ORAL at 08:58

## 2020-01-01 RX ADMIN — SILVER NITRATE APPLICATORS 1 APPLICATION: 25; 75 STICK TOPICAL at 18:09

## 2020-01-01 RX ADMIN — Medication 10 ML: at 08:01

## 2020-01-01 RX ADMIN — INSULIN LISPRO 2 UNITS: 100 INJECTION, SOLUTION INTRAVENOUS; SUBCUTANEOUS at 06:14

## 2020-01-01 RX ADMIN — ALLOPURINOL 200 MG: 100 TABLET ORAL at 07:45

## 2020-01-01 RX ADMIN — Medication 10 ML: at 09:24

## 2020-01-01 RX ADMIN — Medication 10 ML: at 23:18

## 2020-01-01 RX ADMIN — Medication 10 ML: at 09:58

## 2020-01-01 RX ADMIN — ALBUTEROL SULFATE 2.5 MG: 2.5 SOLUTION RESPIRATORY (INHALATION) at 19:09

## 2020-01-01 RX ADMIN — ENOXAPARIN SODIUM 110 MG: 120 INJECTION SUBCUTANEOUS at 08:30

## 2020-01-01 RX ADMIN — HYDROMORPHONE HYDROCHLORIDE 0.5 MG: 2 INJECTION, SOLUTION INTRAMUSCULAR; INTRAVENOUS; SUBCUTANEOUS at 20:35

## 2020-01-01 RX ADMIN — HYDROCODONE BITARTRATE AND ACETAMINOPHEN 1 TABLET: 5; 325 TABLET ORAL at 05:10

## 2020-01-01 RX ADMIN — BISACODYL 10 MG: 10 SUPPOSITORY RECTAL at 17:29

## 2020-01-01 RX ADMIN — CEFEPIME HYDROCHLORIDE 2 G: 2 INJECTION, POWDER, FOR SOLUTION INTRAVENOUS at 08:11

## 2020-01-01 RX ADMIN — FUROSEMIDE 40 MG: 40 TABLET ORAL at 09:24

## 2020-01-01 RX ADMIN — ALBUTEROL SULFATE 2.5 MG: 2.5 SOLUTION RESPIRATORY (INHALATION) at 08:09

## 2020-01-01 RX ADMIN — METOPROLOL TARTRATE 2.5 MG: 5 INJECTION INTRAVENOUS at 08:07

## 2020-01-01 RX ADMIN — Medication 10 ML: at 20:22

## 2020-01-01 RX ADMIN — POTASSIUM CHLORIDE 40 MEQ: 1500 TABLET, EXTENDED RELEASE ORAL at 20:13

## 2020-01-01 RX ADMIN — ATORVASTATIN CALCIUM 20 MG: 20 TABLET, FILM COATED ORAL at 21:16

## 2020-01-01 RX ADMIN — HYDROMORPHONE HYDROCHLORIDE 0.5 MG: 2 INJECTION, SOLUTION INTRAMUSCULAR; INTRAVENOUS; SUBCUTANEOUS at 16:20

## 2020-01-01 RX ADMIN — PANTOPRAZOLE SODIUM 40 MG: 40 TABLET, DELAYED RELEASE ORAL at 07:45

## 2020-01-01 RX ADMIN — Medication 10 ML: at 20:43

## 2020-01-01 RX ADMIN — Medication 1.2 MCG/KG/MIN: at 14:18

## 2020-01-01 RX ADMIN — ENOXAPARIN SODIUM 110 MG: 120 INJECTION SUBCUTANEOUS at 20:43

## 2020-01-01 RX ADMIN — Medication 10 ML: at 08:42

## 2020-01-01 RX ADMIN — DILTIAZEM HYDROCHLORIDE 30 MG: 30 TABLET, FILM COATED ORAL at 23:28

## 2020-01-01 RX ADMIN — ATORVASTATIN CALCIUM 20 MG: 20 TABLET, FILM COATED ORAL at 20:45

## 2020-01-01 RX ADMIN — DIGOXIN 125 MCG: 250 INJECTION, SOLUTION INTRAMUSCULAR; INTRAVENOUS; PARENTERAL at 11:05

## 2020-01-01 RX ADMIN — CHLORHEXIDINE GLUCONATE 0.12% ORAL RINSE 15 ML: 1.2 LIQUID ORAL at 09:24

## 2020-01-01 RX ADMIN — PANTOPRAZOLE SODIUM 40 MG: 40 TABLET, DELAYED RELEASE ORAL at 06:48

## 2020-01-01 RX ADMIN — VANCOMYCIN HYDROCHLORIDE 2250 MG: 750 INJECTION, POWDER, LYOPHILIZED, FOR SOLUTION INTRAVENOUS at 18:00

## 2020-01-01 RX ADMIN — DEXAMETHASONE 2 MG: 0.5 TABLET ORAL at 17:32

## 2020-01-01 RX ADMIN — OXYCODONE HYDROCHLORIDE 5 MG: 5 TABLET ORAL at 04:48

## 2020-01-01 RX ADMIN — LIDOCAINE HYDROCHLORIDE 50 MG: 10 INJECTION, SOLUTION EPIDURAL; INFILTRATION; INTRACAUDAL; PERINEURAL at 14:00

## 2020-01-01 RX ADMIN — OXYCODONE HYDROCHLORIDE 7.5 MG: 5 TABLET ORAL at 17:35

## 2020-01-01 RX ADMIN — Medication 10 ML: at 09:16

## 2020-01-01 RX ADMIN — ATORVASTATIN CALCIUM 20 MG: 20 TABLET, FILM COATED ORAL at 20:53

## 2020-01-01 RX ADMIN — CHLORHEXIDINE GLUCONATE 0.12% ORAL RINSE 15 ML: 1.2 LIQUID ORAL at 21:18

## 2020-01-01 RX ADMIN — Medication 10 ML: at 08:17

## 2020-01-01 RX ADMIN — DEXAMETHASONE SODIUM PHOSPHATE 4 MG: 4 INJECTION, SOLUTION INTRAMUSCULAR; INTRAVENOUS at 21:50

## 2020-01-01 RX ADMIN — LORAZEPAM 2 MG: 2 INJECTION INTRAMUSCULAR; INTRAVENOUS at 15:38

## 2020-01-01 RX ADMIN — DEXAMETHASONE SODIUM PHOSPHATE 4 MG: 4 INJECTION, SOLUTION INTRAMUSCULAR; INTRAVENOUS at 06:14

## 2020-01-01 RX ADMIN — HYDROCODONE BITARTRATE AND ACETAMINOPHEN 1 TABLET: 5; 325 TABLET ORAL at 06:03

## 2020-01-01 RX ADMIN — PIPERACILLIN AND TAZOBACTAM 3.38 G: 3; .375 INJECTION, POWDER, FOR SOLUTION INTRAVENOUS at 21:50

## 2020-01-01 RX ADMIN — ALUMINUM HYDROXIDE, MAGNESIUM HYDROXIDE, AND DIMETHICONE 15 ML: 400; 400; 40 SUSPENSION ORAL at 21:21

## 2020-01-01 RX ADMIN — HEPARIN SODIUM AND DEXTROSE 8.26 UNITS/KG/HR: 10000; 5 INJECTION INTRAVENOUS at 23:28

## 2020-01-01 RX ADMIN — ALLOPURINOL 200 MG: 100 TABLET ORAL at 09:46

## 2020-01-01 RX ADMIN — DEXAMETHASONE 6 MG: 0.5 TABLET ORAL at 08:41

## 2020-01-01 RX ADMIN — DEXAMETHASONE SODIUM PHOSPHATE 10 MG: 10 INJECTION, SOLUTION INTRAMUSCULAR; INTRAVENOUS at 12:34

## 2020-01-01 RX ADMIN — SODIUM CHLORIDE 100 ML/HR: 900 INJECTION, SOLUTION INTRAVENOUS at 06:06

## 2020-01-01 RX ADMIN — DILTIAZEM HYDROCHLORIDE 30 MG: 30 TABLET, FILM COATED ORAL at 17:36

## 2020-01-01 RX ADMIN — PANTOPRAZOLE SODIUM 40 MG: 40 TABLET, DELAYED RELEASE ORAL at 20:21

## 2020-01-01 RX ADMIN — SUCRALFATE 1 G: 1 TABLET ORAL at 17:16

## 2020-01-01 RX ADMIN — SODIUM CHLORIDE 9 ML/HR: 900 INJECTION, SOLUTION INTRAVENOUS at 09:10

## 2020-01-01 RX ADMIN — Medication 10 ML: at 21:47

## 2020-01-01 RX ADMIN — MIDAZOLAM HYDROCHLORIDE 2 MG: 1 INJECTION, SOLUTION INTRAMUSCULAR; INTRAVENOUS at 05:31

## 2020-01-01 RX ADMIN — Medication 5 MG: at 15:24

## 2020-01-01 RX ADMIN — DEXAMETHASONE SODIUM PHOSPHATE 4 MG: 4 INJECTION, SOLUTION INTRAMUSCULAR; INTRAVENOUS at 14:17

## 2020-01-01 RX ADMIN — PANTOPRAZOLE SODIUM 40 MG: 40 TABLET, DELAYED RELEASE ORAL at 11:32

## 2020-01-01 RX ADMIN — HYDROMORPHONE HYDROCHLORIDE 0.5 MG: 2 INJECTION, SOLUTION INTRAMUSCULAR; INTRAVENOUS; SUBCUTANEOUS at 13:06

## 2020-01-01 RX ADMIN — APIXABAN 5 MG: 5 TABLET, FILM COATED ORAL at 12:35

## 2020-01-01 RX ADMIN — OXYCODONE HYDROCHLORIDE 5 MG: 5 TABLET ORAL at 20:23

## 2020-01-01 RX ADMIN — PHENYLEPHRINE HYDROCHLORIDE 50 MCG: 10 INJECTION INTRAVENOUS at 15:04

## 2020-01-01 RX ADMIN — DEXAMETHASONE SODIUM PHOSPHATE 4 MG: 4 INJECTION, SOLUTION INTRAMUSCULAR; INTRAVENOUS at 23:31

## 2020-01-01 RX ADMIN — ALLOPURINOL 200 MG: 100 TABLET ORAL at 08:58

## 2020-01-01 RX ADMIN — ENOXAPARIN SODIUM 110 MG: 120 INJECTION SUBCUTANEOUS at 23:30

## 2020-01-01 RX ADMIN — HYDROMORPHONE HYDROCHLORIDE 0.5 MG: 2 INJECTION, SOLUTION INTRAMUSCULAR; INTRAVENOUS; SUBCUTANEOUS at 19:25

## 2020-01-01 RX ADMIN — BUMETANIDE 2 MG: 0.25 INJECTION INTRAMUSCULAR; INTRAVENOUS at 09:09

## 2020-01-01 RX ADMIN — HEPARIN SODIUM IN SODIUM CHLORIDE 1000 UNITS: 200 INJECTION INTRAVENOUS at 14:38

## 2020-01-01 RX ADMIN — LEVETIRACETAM INJECTION 500 MG: 5 INJECTION INTRAVENOUS at 20:38

## 2020-01-01 RX ADMIN — DEXAMETHASONE SODIUM PHOSPHATE 4 MG: 4 INJECTION, SOLUTION INTRAMUSCULAR; INTRAVENOUS at 06:52

## 2020-01-01 RX ADMIN — FLUTICASONE PROPIONATE 1 SPRAY: 50 SPRAY, METERED NASAL at 07:46

## 2020-01-01 RX ADMIN — DILTIAZEM HYDROCHLORIDE 30 MG: 30 TABLET, FILM COATED ORAL at 11:23

## 2020-01-01 RX ADMIN — FUROSEMIDE 40 MG: 40 TABLET ORAL at 09:00

## 2020-01-01 RX ADMIN — DEXAMETHASONE SODIUM PHOSPHATE 4 MG: 4 INJECTION, SOLUTION INTRAMUSCULAR; INTRAVENOUS at 23:02

## 2020-01-01 RX ADMIN — OXYCODONE HYDROCHLORIDE 5 MG: 5 TABLET ORAL at 17:49

## 2020-01-01 RX ADMIN — SUCRALFATE 1 G: 1 TABLET ORAL at 11:50

## 2020-01-01 RX ADMIN — LIDOCAINE HYDROCHLORIDE 10 ML: 10 INJECTION, SOLUTION INFILTRATION; PERINEURAL at 10:53

## 2020-01-01 RX ADMIN — ALBUTEROL SULFATE 2 PUFF: 90 AEROSOL, METERED RESPIRATORY (INHALATION) at 08:12

## 2020-01-01 RX ADMIN — SODIUM CHLORIDE 3240 ML: 0.9 INJECTION, SOLUTION INTRAVENOUS at 17:30

## 2020-01-01 RX ADMIN — Medication 10 ML: at 09:04

## 2020-01-01 RX ADMIN — ALBUTEROL SULFATE 2.5 MG: 2.5 SOLUTION RESPIRATORY (INHALATION) at 15:10

## 2020-01-01 RX ADMIN — DEXAMETHASONE 2 MG: 0.5 TABLET ORAL at 09:47

## 2020-01-01 RX ADMIN — Medication 0.8 MG: at 15:24

## 2020-01-01 RX ADMIN — PIPERACILLIN AND TAZOBACTAM 3.38 G: 3; .375 INJECTION, POWDER, FOR SOLUTION INTRAVENOUS at 15:42

## 2020-01-01 RX ADMIN — FLUTICASONE PROPIONATE 1 SPRAY: 50 SPRAY, METERED NASAL at 20:40

## 2020-01-01 RX ADMIN — MORPHINE SULFATE 1 MG: 4 INJECTION INTRAVENOUS at 20:45

## 2020-01-01 RX ADMIN — PANTOPRAZOLE SODIUM 40 MG: 40 INJECTION, POWDER, FOR SOLUTION INTRAVENOUS at 06:14

## 2020-01-01 RX ADMIN — METOPROLOL TARTRATE 25 MG: 25 TABLET ORAL at 20:12

## 2020-01-01 RX ADMIN — INSULIN LISPRO 2 UNITS: 100 INJECTION, SOLUTION INTRAVENOUS; SUBCUTANEOUS at 11:12

## 2020-01-01 RX ADMIN — ONDANSETRON 4 MG: 2 INJECTION INTRAMUSCULAR; INTRAVENOUS at 15:15

## 2020-01-01 RX ADMIN — CYANOCOBALAMIN TAB 1000 MCG 1000 MCG: 1000 TAB at 08:35

## 2020-01-01 RX ADMIN — OXYCODONE HYDROCHLORIDE 5 MG: 5 TABLET ORAL at 10:34

## 2020-01-01 RX ADMIN — ACETAMINOPHEN 650 MG: 325 TABLET, FILM COATED ORAL at 09:35

## 2020-01-01 RX ADMIN — ALUMINUM HYDROXIDE, MAGNESIUM HYDROXIDE, AND DIMETHICONE 15 ML: 400; 400; 40 SUSPENSION ORAL at 09:04

## 2020-01-01 RX ADMIN — ALBUTEROL SULFATE 2 PUFF: 90 AEROSOL, METERED RESPIRATORY (INHALATION) at 12:35

## 2020-01-01 RX ADMIN — Medication 10 ML: at 21:02

## 2020-01-01 RX ADMIN — GLYCOPYRROLATE 0.2 MG: 0.2 INJECTION, SOLUTION INTRAMUSCULAR; INTRAVENOUS at 14:45

## 2020-01-01 RX ADMIN — PIPERACILLIN AND TAZOBACTAM 3.38 G: 3; .375 INJECTION, POWDER, FOR SOLUTION INTRAVENOUS at 05:51

## 2020-01-01 RX ADMIN — DEXAMETHASONE 2 MG: 0.5 TABLET ORAL at 08:25

## 2020-01-01 RX ADMIN — FENTANYL CITRATE 100 MCG: 50 INJECTION, SOLUTION INTRAMUSCULAR; INTRAVENOUS at 14:00

## 2020-01-01 RX ADMIN — PANTOPRAZOLE SODIUM 40 MG: 40 TABLET, DELAYED RELEASE ORAL at 17:49

## 2020-01-01 RX ADMIN — Medication 10 ML: at 21:00

## 2020-01-01 RX ADMIN — OXYCODONE HYDROCHLORIDE 7.5 MG: 5 TABLET ORAL at 18:01

## 2020-01-01 RX ADMIN — DILTIAZEM HYDROCHLORIDE 10 MG: 5 INJECTION INTRAVENOUS at 18:53

## 2020-01-01 RX ADMIN — PANTOPRAZOLE SODIUM 40 MG: 40 TABLET, DELAYED RELEASE ORAL at 08:58

## 2020-01-01 RX ADMIN — AMIODARONE HYDROCHLORIDE 0.5 MG/MIN: 50 INJECTION, SOLUTION INTRAVENOUS at 05:04

## 2020-01-01 RX ADMIN — Medication 10 ML: at 08:25

## 2020-01-01 RX ADMIN — Medication 1.4 MCG/KG/MIN: at 00:21

## 2020-01-01 RX ADMIN — Medication 10 ML: at 09:32

## 2020-01-01 RX ADMIN — DILTIAZEM HYDROCHLORIDE 30 MG: 30 TABLET, FILM COATED ORAL at 17:02

## 2020-01-01 RX ADMIN — ACETAMINOPHEN 500 MG: 500 TABLET, FILM COATED ORAL at 16:12

## 2020-01-01 RX ADMIN — BUMETANIDE 1 MG: 0.25 INJECTION INTRAMUSCULAR; INTRAVENOUS at 15:39

## 2020-01-01 RX ADMIN — PANTOPRAZOLE SODIUM 40 MG: 40 TABLET, DELAYED RELEASE ORAL at 06:31

## 2020-01-01 RX ADMIN — PHENYLEPHRINE HYDROCHLORIDE 50 MCG: 10 INJECTION INTRAVENOUS at 14:57

## 2020-01-01 RX ADMIN — METOPROLOL TARTRATE 3 MG: 5 INJECTION INTRAVENOUS at 15:26

## 2020-01-01 RX ADMIN — HYDROCODONE BITARTRATE AND ACETAMINOPHEN 1 TABLET: 7.5; 325 TABLET ORAL at 02:13

## 2020-01-01 RX ADMIN — AMIODARONE HYDROCHLORIDE 200 MG: 200 TABLET ORAL at 10:56

## 2020-01-01 RX ADMIN — METOPROLOL SUCCINATE 50 MG: 50 TABLET, EXTENDED RELEASE ORAL at 08:24

## 2020-01-01 RX ADMIN — Medication 10 ML: at 08:07

## 2020-01-01 RX ADMIN — OXYCODONE HYDROCHLORIDE 5 MG: 5 TABLET ORAL at 12:05

## 2020-01-01 RX ADMIN — PANTOPRAZOLE SODIUM 40 MG: 40 TABLET, DELAYED RELEASE ORAL at 20:40

## 2020-01-01 RX ADMIN — ACETAMINOPHEN 650 MG: 325 TABLET, FILM COATED ORAL at 00:17

## 2020-01-01 RX ADMIN — DEXAMETHASONE SODIUM PHOSPHATE 4 MG: 4 INJECTION, SOLUTION INTRAMUSCULAR; INTRAVENOUS at 23:39

## 2020-01-01 RX ADMIN — BEVACIZUMAB-AWWB 1200 MG: 400 INJECTION, SOLUTION INTRAVENOUS at 11:46

## 2020-01-01 RX ADMIN — Medication 10 ML: at 07:46

## 2020-01-01 RX ADMIN — HEPARIN SODIUM AND DEXTROSE 13.26 UNITS/KG/HR: 10000; 5 INJECTION INTRAVENOUS at 04:32

## 2020-01-01 RX ADMIN — LEVETIRACETAM 500 MG: 5 INJECTION INTRAVENOUS at 21:18

## 2020-01-01 RX ADMIN — OXYCODONE HYDROCHLORIDE 5 MG: 5 TABLET ORAL at 11:09

## 2020-01-01 RX ADMIN — BUMETANIDE 2 MG: 0.25 INJECTION INTRAMUSCULAR; INTRAVENOUS at 00:13

## 2020-01-01 RX ADMIN — ACETAMINOPHEN 500 MG: 500 TABLET, FILM COATED ORAL at 08:41

## 2020-01-01 RX ADMIN — LEVETIRACETAM 500 MG: 5 INJECTION INTRAVENOUS at 09:16

## 2020-01-01 RX ADMIN — AMIODARONE HYDROCHLORIDE 0.5 MG/MIN: 50 INJECTION, SOLUTION INTRAVENOUS at 20:57

## 2020-01-01 RX ADMIN — DEXAMETHASONE SODIUM PHOSPHATE 4 MG: 4 INJECTION, SOLUTION INTRAMUSCULAR; INTRAVENOUS at 15:15

## 2020-01-01 RX ADMIN — ACETAMINOPHEN 650 MG: 325 TABLET, FILM COATED ORAL at 00:12

## 2020-01-01 RX ADMIN — PANTOPRAZOLE SODIUM 40 MG: 40 INJECTION, POWDER, FOR SOLUTION INTRAVENOUS at 05:07

## 2020-01-01 RX ADMIN — APIXABAN 5 MG: 5 TABLET, FILM COATED ORAL at 13:35

## 2020-01-01 RX ADMIN — Medication 0.5 MCG/KG/MIN: at 00:23

## 2020-01-01 RX ADMIN — DIGOXIN 125 MCG: 250 INJECTION, SOLUTION INTRAMUSCULAR; INTRAVENOUS; PARENTERAL at 12:32

## 2020-01-01 RX ADMIN — Medication 10 ML: at 20:35

## 2020-01-01 RX ADMIN — INSULIN LISPRO 2 UNITS: 100 INJECTION, SOLUTION INTRAVENOUS; SUBCUTANEOUS at 23:56

## 2020-01-01 RX ADMIN — SODIUM CHLORIDE 1000 MG: 900 INJECTION, SOLUTION INTRAVENOUS at 06:10

## 2020-01-01 RX ADMIN — Medication 10 ML: at 20:13

## 2020-01-01 RX ADMIN — PANTOPRAZOLE SODIUM 40 MG: 40 TABLET, DELAYED RELEASE ORAL at 08:25

## 2020-01-01 RX ADMIN — PANTOPRAZOLE SODIUM 40 MG: 40 TABLET, DELAYED RELEASE ORAL at 09:46

## 2020-01-01 RX ADMIN — SUCRALFATE 1 G: 1 TABLET ORAL at 17:28

## 2020-01-01 RX ADMIN — DIGOXIN 125 MCG: 0.12 TABLET ORAL at 12:35

## 2020-01-01 RX ADMIN — LEVETIRACETAM 500 MG: 5 INJECTION INTRAVENOUS at 12:21

## 2020-01-01 RX ADMIN — SODIUM BICARBONATE: 84 INJECTION, SOLUTION INTRAVENOUS at 05:58

## 2020-01-01 RX ADMIN — CHLORHEXIDINE GLUCONATE 0.12% ORAL RINSE 15 ML: 1.2 LIQUID ORAL at 09:16

## 2020-01-01 RX ADMIN — DILTIAZEM HYDROCHLORIDE 30 MG: 30 TABLET, FILM COATED ORAL at 23:45

## 2020-01-01 RX ADMIN — PIPERACILLIN AND TAZOBACTAM 3.38 G: 3; .375 INJECTION, POWDER, FOR SOLUTION INTRAVENOUS at 23:39

## 2020-01-01 RX ADMIN — ALBUTEROL SULFATE 2.5 MG: 2.5 SOLUTION RESPIRATORY (INHALATION) at 11:12

## 2020-01-01 RX ADMIN — METOPROLOL TARTRATE 25 MG: 25 TABLET ORAL at 09:24

## 2020-01-01 RX ADMIN — FLUTICASONE PROPIONATE 1 SPRAY: 50 SPRAY, METERED NASAL at 20:16

## 2020-01-01 RX ADMIN — FLUTICASONE PROPIONATE 1 SPRAY: 50 SPRAY, METERED NASAL at 20:45

## 2020-01-01 RX ADMIN — PANTOPRAZOLE SODIUM 40 MG: 40 TABLET, DELAYED RELEASE ORAL at 08:24

## 2020-01-01 RX ADMIN — OXYCODONE HYDROCHLORIDE 5 MG: 5 TABLET ORAL at 04:38

## 2020-01-01 RX ADMIN — LEVETIRACETAM 500 MG: 5 INJECTION INTRAVENOUS at 08:48

## 2020-01-01 RX ADMIN — METOPROLOL TARTRATE 2.5 MG: 5 INJECTION INTRAVENOUS at 10:08

## 2020-01-01 RX ADMIN — DEXAMETHASONE SODIUM PHOSPHATE 4 MG: 4 INJECTION, SOLUTION INTRAMUSCULAR; INTRAVENOUS at 21:18

## 2020-01-01 RX ADMIN — PIPERACILLIN AND TAZOBACTAM 3.38 G: 3; .375 INJECTION, POWDER, FOR SOLUTION INTRAVENOUS at 06:53

## 2020-01-01 RX ADMIN — DEXAMETHASONE SODIUM PHOSPHATE 4 MG: 4 INJECTION, SOLUTION INTRAMUSCULAR; INTRAVENOUS at 06:44

## 2020-01-01 RX ADMIN — DEXAMETHASONE SODIUM PHOSPHATE 4 MG: 4 INJECTION, SOLUTION INTRAMUSCULAR; INTRAVENOUS at 15:14

## 2020-01-01 RX ADMIN — METOPROLOL TARTRATE 2.5 MG: 5 INJECTION INTRAVENOUS at 15:42

## 2020-01-01 RX ADMIN — FLUTICASONE PROPIONATE 1 SPRAY: 50 SPRAY, METERED NASAL at 08:40

## 2020-01-01 RX ADMIN — ETOMIDATE 20 MG: 40 INJECTION, SOLUTION INTRAVENOUS at 19:03

## 2020-01-01 RX ADMIN — DEXAMETHASONE 2 MG: 0.5 TABLET ORAL at 08:59

## 2020-01-01 RX ADMIN — DEXAMETHASONE 2 MG: 0.5 TABLET ORAL at 17:36

## 2020-01-01 RX ADMIN — DEXAMETHASONE SODIUM PHOSPHATE 4 MG: 4 INJECTION, SOLUTION INTRAMUSCULAR; INTRAVENOUS at 21:45

## 2020-01-01 RX ADMIN — PHENYLEPHRINE HYDROCHLORIDE 50 MCG: 10 INJECTION INTRAVENOUS at 14:55

## 2020-01-01 RX ADMIN — PROPOFOL 200 MG: 10 INJECTION, EMULSION INTRAVENOUS at 14:00

## 2020-01-01 RX ADMIN — ONDANSETRON HYDROCHLORIDE 4 MG: 4 TABLET, FILM COATED ORAL at 15:20

## 2020-01-01 RX ADMIN — ALBUTEROL SULFATE 2 PUFF: 90 AEROSOL, METERED RESPIRATORY (INHALATION) at 07:07

## 2020-01-01 RX ADMIN — ALBUTEROL SULFATE 2 PUFF: 90 AEROSOL, METERED RESPIRATORY (INHALATION) at 07:27

## 2020-01-01 RX ADMIN — DEXAMETHASONE 2 MG: 0.5 TABLET ORAL at 21:16

## 2020-01-01 RX ADMIN — CHLORHEXIDINE GLUCONATE 0.12% ORAL RINSE 15 ML: 1.2 LIQUID ORAL at 21:08

## 2020-01-01 RX ADMIN — ALLOPURINOL 200 MG: 100 TABLET ORAL at 08:01

## 2020-01-01 RX ADMIN — SODIUM CHLORIDE, SODIUM LACTATE, POTASSIUM CHLORIDE, AND CALCIUM CHLORIDE: .6; .31; .03; .02 INJECTION, SOLUTION INTRAVENOUS at 13:54

## 2020-01-01 RX ADMIN — SODIUM CHLORIDE 100 ML/HR: 900 INJECTION, SOLUTION INTRAVENOUS at 03:12

## 2020-01-01 RX ADMIN — DILTIAZEM HYDROCHLORIDE 30 MG: 30 TABLET, FILM COATED ORAL at 17:33

## 2020-01-01 RX ADMIN — FLUTICASONE PROPIONATE 1 SPRAY: 50 SPRAY, METERED NASAL at 20:22

## 2020-01-01 RX ADMIN — Medication 1.2 MCG/KG/MIN: at 00:59

## 2020-01-01 RX ADMIN — MELATONIN 2000 UNITS: at 08:34

## 2020-01-01 RX ADMIN — HYDROMORPHONE HYDROCHLORIDE 0.5 MG: 2 INJECTION, SOLUTION INTRAMUSCULAR; INTRAVENOUS; SUBCUTANEOUS at 05:18

## 2020-01-01 RX ADMIN — METOPROLOL TARTRATE 25 MG: 25 TABLET ORAL at 20:35

## 2020-01-01 RX ADMIN — OXYCODONE HYDROCHLORIDE 5 MG: 5 TABLET ORAL at 14:38

## 2020-01-01 RX ADMIN — METOPROLOL TARTRATE 2.5 MG: 5 INJECTION INTRAVENOUS at 03:48

## 2020-01-01 RX ADMIN — CYANOCOBALAMIN TAB 1000 MCG 1000 MCG: 1000 TAB at 08:41

## 2020-01-01 RX ADMIN — BUMETANIDE 0.5 MG: 0.25 INJECTION INTRAMUSCULAR; INTRAVENOUS at 18:06

## 2020-01-01 RX ADMIN — LEVETIRACETAM INJECTION 500 MG: 5 INJECTION INTRAVENOUS at 20:13

## 2020-01-01 RX ADMIN — CHLOROTHIAZIDE SODIUM 500 MG: 500 INJECTION, POWDER, LYOPHILIZED, FOR SOLUTION INTRAVENOUS at 18:13

## 2020-01-01 RX ADMIN — ALBUTEROL SULFATE 2.5 MG: 2.5 SOLUTION RESPIRATORY (INHALATION) at 18:48

## 2020-01-01 RX ADMIN — FUROSEMIDE 40 MG: 40 TABLET ORAL at 08:42

## 2020-01-01 RX ADMIN — DILTIAZEM HYDROCHLORIDE 5 MG/HR: 5 INJECTION INTRAVENOUS at 21:19

## 2020-01-01 RX ADMIN — ALLOPURINOL 200 MG: 100 TABLET ORAL at 08:41

## 2020-01-01 RX ADMIN — PIPERACILLIN AND TAZOBACTAM 3.38 G: 3; .375 INJECTION, POWDER, FOR SOLUTION INTRAVENOUS at 06:14

## 2020-01-01 RX ADMIN — APIXABAN 5 MG: 5 TABLET, FILM COATED ORAL at 21:02

## 2020-01-01 RX ADMIN — ACETAMINOPHEN 500 MG: 500 TABLET, FILM COATED ORAL at 20:53

## 2020-01-01 RX ADMIN — METOPROLOL TARTRATE 25 MG: 25 TABLET, FILM COATED ORAL at 11:00

## 2020-01-01 RX ADMIN — ACETAMINOPHEN 1300 MG: 650 SUPPOSITORY RECTAL at 17:30

## 2020-01-01 RX ADMIN — HYDROMORPHONE HYDROCHLORIDE 0.5 MG: 2 INJECTION, SOLUTION INTRAMUSCULAR; INTRAVENOUS; SUBCUTANEOUS at 14:40

## 2020-01-01 RX ADMIN — PANTOPRAZOLE SODIUM 40 MG: 40 INJECTION, POWDER, FOR SOLUTION INTRAVENOUS at 06:09

## 2020-01-01 RX ADMIN — OXYCODONE HYDROCHLORIDE 5 MG: 5 TABLET ORAL at 06:11

## 2020-01-01 RX ADMIN — APIXABAN 5 MG: 5 TABLET, FILM COATED ORAL at 08:41

## 2020-01-01 RX ADMIN — INSULIN LISPRO 2 UNITS: 100 INJECTION, SOLUTION INTRAVENOUS; SUBCUTANEOUS at 12:32

## 2020-01-01 RX ADMIN — PANTOPRAZOLE SODIUM 40 MG: 40 TABLET, DELAYED RELEASE ORAL at 09:24

## 2020-01-01 RX ADMIN — FENTANYL CITRATE 100 MCG: 50 INJECTION, SOLUTION INTRAMUSCULAR; INTRAVENOUS at 14:36

## 2020-01-01 RX ADMIN — OXYCODONE HYDROCHLORIDE 7.5 MG: 5 TABLET ORAL at 17:41

## 2020-01-01 RX ADMIN — DEXAMETHASONE SODIUM PHOSPHATE 4 MG: 4 INJECTION, SOLUTION INTRAMUSCULAR; INTRAVENOUS at 21:47

## 2020-01-01 RX ADMIN — PIPERACILLIN AND TAZOBACTAM 3.38 G: 3; .375 INJECTION, POWDER, FOR SOLUTION INTRAVENOUS at 15:15

## 2020-01-01 RX ADMIN — DIGOXIN 125 MCG: 250 INJECTION, SOLUTION INTRAMUSCULAR; INTRAVENOUS; PARENTERAL at 11:00

## 2020-01-01 RX ADMIN — SODIUM CHLORIDE 500 ML: 0.9 INJECTION, SOLUTION INTRAVENOUS at 19:00

## 2020-01-01 RX ADMIN — Medication 10 ML: at 08:48

## 2020-01-01 RX ADMIN — BISACODYL 10 MG: 10 SUPPOSITORY RECTAL at 06:46

## 2020-01-01 RX ADMIN — PROPOFOL 20 MG: 10 INJECTION, EMULSION INTRAVENOUS at 14:49

## 2020-01-01 RX ADMIN — ENOXAPARIN SODIUM 120 MG: 120 INJECTION SUBCUTANEOUS at 14:15

## 2020-01-01 RX ADMIN — ACETAMINOPHEN 650 MG: 325 TABLET, FILM COATED ORAL at 22:12

## 2020-01-01 RX ADMIN — DEXAMETHASONE 2 MG: 0.5 TABLET ORAL at 21:00

## 2020-01-01 RX ADMIN — METOPROLOL TARTRATE 2.5 MG: 5 INJECTION INTRAVENOUS at 08:29

## 2020-01-01 RX ADMIN — HYDROCODONE BITARTRATE AND ACETAMINOPHEN 1 TABLET: 5; 325 TABLET ORAL at 06:42

## 2020-01-01 RX ADMIN — FLUTICASONE PROPIONATE 1 SPRAY: 50 SPRAY, METERED NASAL at 08:53

## 2020-01-01 RX ADMIN — SUCRALFATE 1 G: 1 TABLET ORAL at 13:38

## 2020-01-01 RX ADMIN — APIXABAN 5 MG: 5 TABLET, FILM COATED ORAL at 13:38

## 2020-01-01 RX ADMIN — SODIUM BICARBONATE: 84 INJECTION, SOLUTION INTRAVENOUS at 11:06

## 2020-01-01 RX ADMIN — AMIODARONE HYDROCHLORIDE 150 MG: 150 INJECTION, SOLUTION INTRAVENOUS at 14:35

## 2020-01-01 RX ADMIN — SUCRALFATE 1 G: 1 TABLET ORAL at 11:20

## 2020-01-01 RX ADMIN — DEXAMETHASONE SODIUM PHOSPHATE 4 MG: 4 INJECTION, SOLUTION INTRAMUSCULAR; INTRAVENOUS at 05:51

## 2020-01-01 RX ADMIN — KETOROLAC TROMETHAMINE 15 MG: 15 INJECTION, SOLUTION INTRAMUSCULAR; INTRAVENOUS at 01:30

## 2020-01-01 RX ADMIN — FUROSEMIDE 40 MG: 40 TABLET ORAL at 09:46

## 2020-01-01 RX ADMIN — Medication 10 ML: at 20:16

## 2020-01-01 RX ADMIN — AMIODARONE HYDROCHLORIDE 0.5 MG/MIN: 50 INJECTION, SOLUTION INTRAVENOUS at 14:54

## 2020-01-01 RX ADMIN — ALLOPURINOL 200 MG: 100 TABLET ORAL at 08:25

## 2020-01-01 RX ADMIN — DIGOXIN 500 MCG: 250 INJECTION, SOLUTION INTRAMUSCULAR; INTRAVENOUS; PARENTERAL at 17:33

## 2020-01-01 RX ADMIN — ALLOPURINOL 200 MG: 100 TABLET ORAL at 08:35

## 2020-01-01 RX ADMIN — BISACODYL 10 MG: 10 SUPPOSITORY RECTAL at 21:52

## 2020-01-01 RX ADMIN — PROPOFOL 20 MG: 10 INJECTION, EMULSION INTRAVENOUS at 14:43

## 2020-01-01 RX ADMIN — MIDAZOLAM HYDROCHLORIDE 2 MG: 1 INJECTION, SOLUTION INTRAMUSCULAR; INTRAVENOUS at 10:11

## 2020-01-01 RX ADMIN — DEXAMETHASONE 2 MG: 0.5 TABLET ORAL at 08:35

## 2020-01-01 RX ADMIN — PANTOPRAZOLE SODIUM 40 MG: 40 INJECTION, POWDER, FOR SOLUTION INTRAVENOUS at 05:50

## 2020-01-01 RX ADMIN — DEXAMETHASONE SODIUM PHOSPHATE 4 MG: 4 INJECTION, SOLUTION INTRAMUSCULAR; INTRAVENOUS at 05:50

## 2020-01-01 RX ADMIN — PIPERACILLIN AND TAZOBACTAM 3.38 G: 3; .375 INJECTION, POWDER, FOR SOLUTION INTRAVENOUS at 14:35

## 2020-01-01 RX ADMIN — ENOXAPARIN SODIUM 110 MG: 120 INJECTION SUBCUTANEOUS at 08:07

## 2020-01-01 RX ADMIN — METOPROLOL TARTRATE 25 MG: 25 TABLET ORAL at 20:40

## 2020-01-01 RX ADMIN — PANTOPRAZOLE SODIUM 40 MG: 40 INJECTION, POWDER, FOR SOLUTION INTRAVENOUS at 06:05

## 2020-01-01 RX ADMIN — TRIAMTERENE AND HYDROCHLOROTHIAZIDE 1 TABLET: 75; 50 TABLET ORAL at 08:35

## 2020-01-01 RX ADMIN — SUCRALFATE 1 G: 1 TABLET ORAL at 20:59

## 2020-01-01 RX ADMIN — INSULIN LISPRO 2 UNITS: 100 INJECTION, SOLUTION INTRAVENOUS; SUBCUTANEOUS at 06:20

## 2020-01-01 RX ADMIN — METOPROLOL TARTRATE 2.5 MG: 5 INJECTION INTRAVENOUS at 20:43

## 2020-01-01 RX ADMIN — PHENYLEPHRINE HYDROCHLORIDE 50 MCG: 10 INJECTION INTRAVENOUS at 15:03

## 2020-01-01 RX ADMIN — METOPROLOL TARTRATE 2.5 MG: 5 INJECTION INTRAVENOUS at 03:22

## 2020-01-01 RX ADMIN — DILTIAZEM HYDROCHLORIDE 30 MG: 30 TABLET, FILM COATED ORAL at 05:36

## 2020-01-01 RX ADMIN — POLYETHYLENE GLYCOL 3350 17 G: 17 POWDER, FOR SOLUTION ORAL at 08:36

## 2020-01-01 RX ADMIN — Medication 1.2 MCG/KG/MIN: at 17:58

## 2020-01-01 RX ADMIN — METOPROLOL TARTRATE 25 MG: 25 TABLET ORAL at 08:17

## 2020-01-01 RX ADMIN — DEXAMETHASONE SODIUM PHOSPHATE 4 MG: 4 INJECTION, SOLUTION INTRAMUSCULAR; INTRAVENOUS at 13:06

## 2020-01-01 RX ADMIN — LEVETIRACETAM INJECTION 500 MG: 5 INJECTION INTRAVENOUS at 08:40

## 2020-01-01 RX ADMIN — SUCRALFATE 1 G: 1 TABLET ORAL at 21:01

## 2020-01-01 RX ADMIN — PHENYLEPHRINE HYDROCHLORIDE 50 MCG: 10 INJECTION INTRAVENOUS at 15:12

## 2020-01-01 RX ADMIN — DILTIAZEM HYDROCHLORIDE 30 MG: 30 TABLET, FILM COATED ORAL at 01:54

## 2020-01-01 RX ADMIN — HYDROCODONE BITARTRATE AND ACETAMINOPHEN 1 TABLET: 5; 325 TABLET ORAL at 00:19

## 2020-01-01 RX ADMIN — FLUTICASONE PROPIONATE 1 SPRAY: 50 SPRAY, METERED NASAL at 09:24

## 2020-01-01 RX ADMIN — FLUTICASONE PROPIONATE 1 SPRAY: 50 SPRAY, METERED NASAL at 20:23

## 2020-01-01 RX ADMIN — DEXTROSE MONOHYDRATE 100 ML/HR: 50 INJECTION, SOLUTION INTRAVENOUS at 09:04

## 2020-01-01 RX ADMIN — Medication 10 ML: at 08:11

## 2020-01-01 RX ADMIN — METOPROLOL TARTRATE 25 MG: 25 TABLET ORAL at 20:22

## 2020-01-01 RX ADMIN — CHLORHEXIDINE GLUCONATE 0.12% ORAL RINSE 15 ML: 1.2 LIQUID ORAL at 08:48

## 2020-01-01 RX ADMIN — PANTOPRAZOLE SODIUM 40 MG: 40 TABLET, DELAYED RELEASE ORAL at 06:44

## 2020-01-01 RX ADMIN — TRAMADOL HYDROCHLORIDE 50 MG: 50 TABLET, FILM COATED ORAL at 07:50

## 2020-01-01 RX ADMIN — HYDROMORPHONE HYDROCHLORIDE 0.5 MG: 2 INJECTION, SOLUTION INTRAMUSCULAR; INTRAVENOUS; SUBCUTANEOUS at 16:28

## 2020-01-01 RX ADMIN — Medication 20 ML: at 14:23

## 2020-01-01 RX ADMIN — ALLOPURINOL 200 MG: 100 TABLET ORAL at 09:24

## 2020-01-01 RX ADMIN — CEFAZOLIN SODIUM 2 G: 10 INJECTION, POWDER, FOR SOLUTION INTRAVENOUS at 05:49

## 2020-01-01 RX ADMIN — DEXAMETHASONE SODIUM PHOSPHATE 4 MG: 4 INJECTION, SOLUTION INTRAMUSCULAR; INTRAVENOUS at 23:47

## 2020-01-01 RX ADMIN — SUCRALFATE 1 G: 1 TABLET ORAL at 17:05

## 2020-01-01 RX ADMIN — FUROSEMIDE 40 MG: 40 TABLET ORAL at 07:45

## 2020-01-01 RX ADMIN — DEXAMETHASONE SODIUM PHOSPHATE 4 MG: 4 INJECTION, SOLUTION INTRAMUSCULAR; INTRAVENOUS at 15:42

## 2020-01-01 RX ADMIN — TRIAMTERENE AND HYDROCHLOROTHIAZIDE 1 TABLET: 75; 50 TABLET ORAL at 13:38

## 2020-01-01 RX ADMIN — ACETAMINOPHEN 650 MG: 325 TABLET, FILM COATED ORAL at 04:39

## 2020-01-01 RX ADMIN — ACETAMINOPHEN 500 MG: 500 TABLET, FILM COATED ORAL at 14:20

## 2020-01-01 RX ADMIN — HYDROCODONE BITARTRATE AND ACETAMINOPHEN 1 TABLET: 7.5; 325 TABLET ORAL at 22:06

## 2020-01-01 RX ADMIN — DEXAMETHASONE SODIUM PHOSPHATE 4 MG: 4 INJECTION, SOLUTION INTRAMUSCULAR; INTRAVENOUS at 06:05

## 2020-01-01 RX ADMIN — METOPROLOL SUCCINATE 50 MG: 50 TABLET, EXTENDED RELEASE ORAL at 20:16

## 2020-01-01 RX ADMIN — Medication 10 ML: at 08:40

## 2020-01-01 RX ADMIN — HYDROMORPHONE HYDROCHLORIDE 0.5 MG: 2 INJECTION, SOLUTION INTRAMUSCULAR; INTRAVENOUS; SUBCUTANEOUS at 17:46

## 2020-01-01 RX ADMIN — METOPROLOL TARTRATE 25 MG: 25 TABLET ORAL at 08:25

## 2020-01-01 RX ADMIN — CHLORHEXIDINE GLUCONATE 0.12% ORAL RINSE 15 ML: 1.2 LIQUID ORAL at 22:22

## 2020-01-01 RX ADMIN — OXYCODONE HYDROCHLORIDE 5 MG: 5 TABLET ORAL at 20:52

## 2020-01-01 RX ADMIN — DEXAMETHASONE SODIUM PHOSPHATE 4 MG: 4 INJECTION, SOLUTION INTRAMUSCULAR; INTRAVENOUS at 06:09

## 2020-01-01 RX ADMIN — INSULIN LISPRO 2 UNITS: 100 INJECTION, SOLUTION INTRAVENOUS; SUBCUTANEOUS at 12:20

## 2020-01-01 RX ADMIN — ACETAMINOPHEN 650 MG: 325 TABLET, FILM COATED ORAL at 07:11

## 2020-01-01 RX ADMIN — METOPROLOL TARTRATE 2.5 MG: 5 INJECTION INTRAVENOUS at 21:45

## 2020-01-01 RX ADMIN — OXYCODONE HYDROCHLORIDE 5 MG: 5 TABLET ORAL at 08:21

## 2020-01-01 RX ADMIN — DEXAMETHASONE 2 MG: 0.5 TABLET ORAL at 08:58

## 2020-01-01 RX ADMIN — PROPOFOL 20 MG: 10 INJECTION, EMULSION INTRAVENOUS at 15:27

## 2020-01-01 RX ADMIN — OXYCODONE HYDROCHLORIDE 5 MG: 5 TABLET ORAL at 14:14

## 2020-01-01 RX ADMIN — DOCUSATE SODIUM 100 MG: 100 CAPSULE, LIQUID FILLED ORAL at 23:02

## 2020-01-01 RX ADMIN — ATORVASTATIN CALCIUM 20 MG: 20 TABLET, FILM COATED ORAL at 20:40

## 2020-01-01 RX ADMIN — ONDANSETRON 4 MG: 2 INJECTION INTRAMUSCULAR; INTRAVENOUS at 13:52

## 2020-01-01 RX ADMIN — FUROSEMIDE 40 MG: 40 TABLET ORAL at 08:01

## 2020-01-01 RX ADMIN — DILTIAZEM HYDROCHLORIDE 30 MG: 30 TABLET, FILM COATED ORAL at 06:24

## 2020-01-01 RX ADMIN — MAGNESIUM HYDROXIDE 10 ML: 2400 SUSPENSION ORAL at 14:51

## 2020-01-01 RX ADMIN — TRIAMTERENE AND HYDROCHLOROTHIAZIDE 1 TABLET: 75; 50 TABLET ORAL at 08:01

## 2020-01-01 RX ADMIN — MELATONIN 2000 UNITS: at 09:57

## 2020-01-01 RX ADMIN — PIPERACILLIN AND TAZOBACTAM 3.38 G: 3; .375 INJECTION, POWDER, FOR SOLUTION INTRAVENOUS at 06:09

## 2020-01-01 RX ADMIN — ATORVASTATIN CALCIUM 20 MG: 20 TABLET, FILM COATED ORAL at 20:22

## 2020-01-01 RX ADMIN — PANTOPRAZOLE SODIUM 40 MG: 40 TABLET, DELAYED RELEASE ORAL at 08:59

## 2020-01-01 RX ADMIN — Medication 10 ML: at 08:39

## 2020-01-01 RX ADMIN — Medication 10 ML: at 21:45

## 2020-01-01 RX ADMIN — DEXAMETHASONE 2 MG: 0.5 TABLET ORAL at 09:58

## 2020-01-01 RX ADMIN — MORPHINE SULFATE 1 MG: 4 INJECTION INTRAVENOUS at 02:10

## 2020-01-01 RX ADMIN — Medication 1.2 MCG/KG/MIN: at 12:12

## 2020-01-01 RX ADMIN — OXYCODONE HYDROCHLORIDE 5 MG: 5 TABLET ORAL at 11:55

## 2020-01-01 RX ADMIN — DEXAMETHASONE SODIUM PHOSPHATE 4 MG: 4 INJECTION, SOLUTION INTRAMUSCULAR; INTRAVENOUS at 17:37

## 2020-01-01 RX ADMIN — OXYCODONE HYDROCHLORIDE 5 MG: 5 TABLET ORAL at 14:24

## 2020-01-01 RX ADMIN — ATORVASTATIN CALCIUM 20 MG: 20 TABLET, FILM COATED ORAL at 20:38

## 2020-01-01 RX ADMIN — OXYCODONE HYDROCHLORIDE 5 MG: 5 TABLET ORAL at 10:40

## 2020-01-01 RX ADMIN — Medication 10 ML: at 20:38

## 2020-01-01 RX ADMIN — PANTOPRAZOLE SODIUM 40 MG: 40 INJECTION, POWDER, FOR SOLUTION INTRAVENOUS at 05:54

## 2020-01-01 RX ADMIN — Medication 10 ML: at 20:39

## 2020-01-01 RX ADMIN — SODIUM CHLORIDE 75 ML/HR: 900 INJECTION, SOLUTION INTRAVENOUS at 20:46

## 2020-01-01 RX ADMIN — SODIUM CHLORIDE 100 ML/HR: 900 INJECTION, SOLUTION INTRAVENOUS at 21:48

## 2020-01-01 RX ADMIN — ALBUTEROL SULFATE 2.5 MG: 2.5 SOLUTION RESPIRATORY (INHALATION) at 11:25

## 2020-01-01 RX ADMIN — INSULIN LISPRO 4 UNITS: 100 INJECTION, SOLUTION INTRAVENOUS; SUBCUTANEOUS at 19:26

## 2020-01-01 RX ADMIN — CYANOCOBALAMIN TAB 1000 MCG 1000 MCG: 1000 TAB at 10:18

## 2020-01-01 RX ADMIN — AMIODARONE HYDROCHLORIDE 200 MG: 200 TABLET ORAL at 09:24

## 2020-01-01 RX ADMIN — ALBUTEROL SULFATE 2.5 MG: 2.5 SOLUTION RESPIRATORY (INHALATION) at 11:30

## 2020-01-01 RX ADMIN — MELATONIN 2000 UNITS: at 08:01

## 2020-01-01 RX ADMIN — GADOTERIDOL 20 ML: 279.3 INJECTION, SOLUTION INTRAVENOUS at 11:00

## 2020-01-01 RX ADMIN — TRAMADOL HYDROCHLORIDE 50 MG: 50 TABLET, FILM COATED ORAL at 00:00

## 2020-01-01 RX ADMIN — Medication 1.2 MCG/KG/MIN: at 21:18

## 2020-01-03 NOTE — TELEPHONE ENCOUNTER
On antibiotic per Dr Headley , sp cabg, left leg cold   Please advise  [phone]  Stefani wife or Devon  [phone]

## 2020-01-04 PROBLEM — G93.89 MASS OF BRAIN: Status: ACTIVE | Noted: 2020-01-01

## 2020-01-04 NOTE — ED NOTES
Pt c/o h/a, left foot coldness and numbness with left arm burning x 1 month; states spoke with Dr. Og last PM and was told to come to ED; states taking abx for possible infection Rx by PCP.     Lelo Antunez RN  01/04/20 0947

## 2020-01-04 NOTE — ED NOTES
Pt sitting up abed,denies needs. No s/s distress; call light within reach.     Lelo Antunez RN  01/04/20 1219

## 2020-01-04 NOTE — H&P
Pulmonary/ Critical Care/ sleep medicine ADMISSION H&P Note        Patient Name:  Portillo Pete    :  1945    Medical Record:  [id number]    Primary Care Physician     Quyen Headley  Portillo Pete is a 74 y.o. male who presented to the emergency department for evaluation of a 1 month.  Of intermittent left sided paresthesias.  He reports that his left arm has had a burning sensation and his left leg feels cold although is not cold to the touch.  He denies generalized or unilateral weakness.  He denies recent fever, chills, nausea, vomiting, shortness of air, chest pain.  He reports occasional numbness and tingling of the fingers.  He reports that he has had an intermittent headache for 1 week with progressive worsening.  He does have a history of paroxysmal atrial fibrillation however is compliant with anticoagulation with Eliquis.    MRI of the brain obtained in the emergency department revealed a contrast-enhancing 2.0 x 1.7 cm mass within the right thalamus, differential considerations include glioma or metastatic disease.  There is mass-effect on the third ventricle but without definite hydrocephalus.  Neurosurgery was consulted by the emergency department physician and Keppra and Decadron have been initiated.  The patient will be admitted to the intensive care unit for close neuro monitoring and medical management.    Past medical history includes coronary artery disease, cardiomegaly, colon cancer status post chemo and surgery 9 years ago, DVT/PE, hypertension, chronic kidney disease, hyperlipidemia, obesity, paroxysmal atrial fibrillation with chronic anticoagulation with Eliquis, tobacco abuse in remission.    Review of Systems    as above    Medical History    Past Medical History:   Diagnosis Date   • 3-vessel CAD 04/15/2019    Severe Involving Proximal LAD/LCX--Noted on Cardiac Cath    • Allergic rhinitis 2018   • Angina pectoris (CMS/HCC) Chronic   • Bilateral renal cysts  04/15/2019    Noted on Renal US   • Bronchitis 01/2017   • CAD (coronary artery disease) 11/17/2016    Involving the LAD/LCX--Noted on Cardiac Cath   • Cardiomegaly 11/16/16 & 02/06/2017 & 4/19/19-XR    Noted on Chest XR & CT Chest   • Chronic fatigue    • Colon cancer (CMS/[facility]) Dx in 2000    Stage 2 w/Chemo--S/p Sx on 06/30/11    • Coronary artery calcification    • Cyst of left kidney 04/15/2019    1.7CM--Noted on Renal US   • DVT (deep venous thrombosis) (CMS/[facility]) Hx   • Dyspnea 04/2019   • Elevated serum creatinine 4/15/19-F IP   • Enlarged prostate 04/15/2019    Noted on Renal US   • First degree AV block 08/09/2012    Noted on EKG   • GERD (gastroesophageal reflux disease)     Prilosec   • History of cancer chemotherapy 2011    Colon Ca   • History of chest x-ray 4/19/19-[facility]    L Midlung Granulomas; Stable Cardiomegaly   • History of EKG 08/9/12-[facility]    First Degree AV Block; Premature Atrial Contractions   • History of EKG 4/2019-[facility]   • History of EKG 6/25/11-[facility]    L Axis Deviations; Probable Lateral Infarct; Borderline AV Conduction Delay   • History of nuclear stress test 11/16/16-[facility]    Inferoapocal Wall Ischemia Noted   • Hx of chest x-ray 11/16/16-[facility]    Normal w/Stable Cardiomegaly   • Hx of colonic polyps 06/24/2011    Noted on Colonoscopy   • Hyperbilirubinemia Hx   • Hypertension     Controlled w/Meds   • Kidney disease, chronic, stage III (GFR 30-59 ml/min) (CMS/[facility])    • LAD stenosis 11/17/2016    40-50% LAD Disease-First Diagonal is Small w/60-70% Disease--Noted on Cardiac Cath    • LAD stenosis 04/15/2019    70% Proximal LAD Stenosis; 90% Diagonal; 80-90% First Marginal Stenosis; 2nd Marginal is Large with 60-70% Disease--Noted on Cardiac Cath    • Left axis deviation 06/25/2011    Noted on EKG   • Lower extremity edema 2017   • Lung granuloma (CMS/[facility]) 04/19/2019    Noted on Chest XR   • Megaloblastic anemia due to B12 deficiency    • Mixed hyperlipidemia    • Obesity    • Osteoarthritis      Knee   • Paroxysmal A-fib (CMS/HCC) Dx 11/2016    Eliquis   • PE (pulmonary thromboembolism) (CMS/HCC) Hx   • Pulmonary nodule, right 02/06/2017    8MM--Noted on CT Chest   • Pulmonary nodules 01/27/17 & 02/06/2017    Several--Noted on CT Chest Measuring 2-3MM   • RCA occlusion (CMS/HCC) 11/17/2016    50% Proximal Disease--Noted on Cardiac Cath    • RCA occlusion (CMS/HCC) 04/15/2019    70% Ostial Stenosis; Ostial the PDA to Proximal PDA Has 90% Disease--Noted on Cardiac Cath   • Renal cyst 04/15/2019    Noted on Renal US--R Measuring 6.8CM   • SOB (shortness of breath) 2016   • Vitamin B 12 deficiency    • Vitamin D deficiency         Surgical History    Past Surgical History:   Procedure Laterality Date   • CARDIAC CATHETERIZATION Left 11/17/16-F    w/Arteriography/Angiography--Dr. Og--Nonobstructive CAD Involving the LAD & LCX   • CARDIAC CATHETERIZATION Left 4/15/19-F    w/Arteriography/Angiography--Dr. Og--Severe 3V CAD   • COLON RESECTION  2000    Colon Ca   • COLON SURGERY  06/25/2011    Lap L Hemicolectomy w/Transverse Sigmoid Side to Side Anastomaosis   • COLONOSCOPY W/ POLYPECTOMY  06/24/2011 & 05/2012   • CORONARY ARTERY BYPASS GRAFT  04/24/2019    X4  Dr Bermudez   • MAZE PROCEDURE  04/23/2019    Dr Bermudez   • OTHER SURGICAL HISTORY  6/30/11-[facility]    L Subclavian Mediport Placement w/Fluoroscopy Interpretation--Akila Cartagena MD--For Stage 2 Colon Cancer   • OTHER SURGICAL HISTORY  8/9/12-[facility]    Removal of L Subclavian Mediport--Akila Cartagena MD--For Stage 1 Colon Cancer/Chemo        Family History    Family History   Problem Relation Age of Onset   • Heart failure Mother        Social History    Social History     Tobacco Use   • Smoking status: Former Smoker     Types: Cigarettes   • Smokeless tobacco: Never Used   • Tobacco comment: Smoked x 15 Yrs--Quit in 1982   Substance Use Topics   • Alcohol use: No     Comment: Daily Caffeine        Allergies    No Known Allergies    Medications     Scheduled Meds:  dexamethasone 4 mg Intravenous Q6H   levETIRAcetam 500 mg Intravenous Q12H   [START ON 2020] pantoprazole 40 mg Intravenous Q AM   sodium chloride 10 mL Intravenous Q12H     Continuous Infusions:   PRN Meds:.ondansetron **OR** ondansetron  •  oxyCODONE  •  [COMPLETED] Insert peripheral IV **AND** sodium chloride  •  sodium chloride      Physical Exam    tMax 24 hrs:  Temp (24hrs), Av.9 °F (36.6 °C), Min:97.6 °F (36.4 °C), Max:98.1 °F (36.7 °C)    Vitals Ranges:  Temp:  [97.6 °F (36.4 °C)-98.1 °F (36.7 °C)] 98.1 °F (36.7 °C)  Heart Rate:  [59-77] 67  Resp:  [18-20] 18  BP: ()/(54-77) 105/56  Intake and Output Last 3 Shifts:  No intake/output data recorded.    Constitutional:  Well developed, well nourished, no acute distress, non-toxic appearance   Eyes:  PERRL, conjunctiva normal   HENT:  Atraumatic, external ears normal, nose normal, oropharynx moist. Neck- normal range of motion, no tenderness, supple   Respiratory:  No respiratory distress, normal breath sounds, no rales, no wheezing   Cardiovascular:  Normal rate, normal rhythm, no murmurs, no gallops, no rubs   GI:  Soft, nondistended, normal bowel sounds, nontender, no rebound, no guarding   : Deferred  Musculoskeletal:  No edema, no tenderness, no deformities  Integument:  Well hydrated, no rash   Neurologic:  Alert & oriented x 3, no lateralizing deficits.  Reports continued headache without vision changes.  Reports continued sensation of coolness to the right lower extremity and warmth to the right upper extremity  Psychiatric:  Speech and behavior appropriate     labs    Lab Results (last 24 hours)     Procedure Component Value Units Date/Time    Protime-INR [[id number]]  (Abnormal) Collected:  20    Specimen:  Blood Updated:  20 1029     Protime 12.0 Seconds      INR 1.18    aPTT [[id number]]  (Normal) Collected:  20    Specimen:  Blood Updated:  20 1029     PTT 26.3 seconds     Basic  Metabolic Panel [[id number]]  (Abnormal) Collected:  01/04/20 0952    Specimen:  Blood Updated:  01/04/20 1020     Glucose 126 mg/dL      BUN 26 mg/dL      Creatinine 1.75 mg/dL      Sodium 139 mmol/L      Potassium 3.3 mmol/L      Chloride 97 mmol/L      CO2 26.0 mmol/L      Calcium 9.9 mg/dL      eGFR Non African Amer 39 mL/min/1.73      BUN/Creatinine Ratio 14.9     Anion Gap 16.0 mmol/L     Narrative:       GFR Normal >60  Chronic Kidney Disease <60  Kidney Failure <15      Troponin [[id number]]  (Normal) Collected:  01/04/20 0952    Specimen:  Blood Updated:  01/04/20 1020     Troponin T 0.012 ng/mL     Narrative:       Troponin T Reference Range:  <= 0.03 ng/mL-   Negative for AMI  >0.03 ng/mL-     Abnormal for myocardial necrosis.  Clinicians would have to utilize clinical acumen, EKG, Troponin and serial changes to determine if it is an Acute Myocardial Infarction or myocardial injury due to an underlying chronic condition.     CBC & Differential [[id number]] Collected:  01/04/20 0952    Specimen:  Blood Updated:  01/04/20 1001    Narrative:       The following orders were created for panel order CBC & Differential.  Procedure                               Abnormality         Status                     ---------                               -----------         ------                     CBC Auto Differential[[id number]]        Abnormal            Final result                 Please view results for these tests on the individual orders.    CBC Auto Differential [[id number]]  (Abnormal) Collected:  01/04/20 0952    Specimen:  Blood Updated:  01/04/20 1001     WBC 7.80 10*3/mm3      RBC 5.25 10*6/mm3      Hemoglobin 14.7 g/dL      Hematocrit 43.4 %      MCV 82.6 fL      MCH 28.0 pg      MCHC 33.9 g/dL      RDW 17.0 %      RDW-SD 49.9 fl      MPV 8.2 fL      Platelets 233 10*3/mm3      Neutrophil % 70.5 %      Lymphocyte % 15.9 %      Monocyte % 12.7 %      Eosinophil % 0.4 %      Basophil % 0.5 %      Neutrophils,  Absolute 5.50 10*3/mm3      Lymphocytes, Absolute 1.20 10*3/mm3      Monocytes, Absolute 1.00 10*3/mm3      Eosinophils, Absolute 0.00 10*3/mm3      Basophils, Absolute 0.00 10*3/mm3      nRBC 0.2 /100 WBC           Imaging & Other Studies    Imaging Results (Last 72 Hours)     Procedure Component Value Units Date/Time    MRI Brain With & Without Contrast [[id number]] Collected:  01/04/20 1136     Updated:  01/04/20 1143    Narrative:       MRI BRAIN W WO CONTRAST-     Date of Exam: 1/4/2020 10:14 AM     Indication: left sided paresthesias.     Technique: Technique: Routine multiplanar multisequence MR imaging of  the brain was performed without and with the administration of 20 ml of  ProHance  gadolinium contrast.        Comparison Exams: None available.     FINDINGS:  Diffusion-weighted images demonstrate no acute infarcts.  There is no  evidence of acute intracranial hemorrhage.  There is mild generalized  parenchymal volume loss.  Within the right thalamus there is a 2.0 x 1.7  cm mass with a moderate amount of surrounding vasogenic edema.  This  mass has decreased T1 and T2 signal.  Postcontrast images demonstrate  nodular peripheral contrast enhancement.  There are also multiple linear  areas of internal enhancement.  Differential considerations would  include glioma or metastatic disease.  Glioma is favored.  There is mild  mass effect on the third ventricle.  There is no definite hydro-Cephalus  at this time.  No other contrast enhancing lesions identified.  There  are no abnormal extra-axial fluid collections.  The major intracranial  vascular flow voids are preserved.  Within the subcortical white matter  of the right frontal lobe there is a focal area of increased T2 signal  most likely due to chronic ischemic change.  There is no associated  contrast enhancement.     Globes and orbits are within normal limits.  There is a polyp or mucous  retention cyst within the left maxillary sinus.  Other  visualized  paranasal sinuses and mastoid air cells are clear.  Sella and  suprasellar cistern are within normal limits.  Craniovertebral junction  appears normal.     \             Impression:             1.  Contrast enhancing 2.0 x 1.7 cm mass within the right thalamus.   Differential considerations would include glioma or metastatic disease.   Glioma is favored.  There is mass effect on the third ventricle but  without definite hydrocephalus at this time.     Electronically Signed By-Andi Hill On:1/4/2020 11:41 AM  This report was finalized on [id number] by  Andi Hill, .    XR Chest 1 View [[id number]] Collected:  01/04/20 1019     Updated:  01/04/20 1022    Narrative:       DATE OF EXAM:  1/4/2020 9:45 AM     PROCEDURE:  XR CHEST 1 VW-     INDICATIONS:  LEFT arm paresthesias      COMPARISON:  04/26/2019.     TECHNIQUE:   Single radiographic view of the chest was obtained.     FINDINGS:  The heart is borderline enlarged with postsurgical changes apparent. The  pulmonary vascular markings are normal. The lungs and pleural spaces are  clear of active disease.  There are chronic age-related changes  involving the bony thorax and thoracic aorta.       Impression:          1. Borderline cardiomegaly.  2. No active pulmonary disease.     Electronically Signed By-Julio Schaeffer On:1/4/2020 10:20 AM  This report was finalized on [id number] by  Julio Schaeffer, .          Assessment    Brain mass as above, new finding  Left-sided paresthesias secondary to brain mass   History of colon cancer status post chemo and surgery 9 years ago  Hypertension  Acute kidney injury on chronic kidney disease  Hyperlipidemia  History of DVT/PE  Paroxysmal atrial fibrillation, chronic anticoagulation with Eliquis.  Currently sinus rhythm  Tobacco abuse in remission    Plan    CT of the head reviewed as above  Neurosurgery consulted  Decadron and Keppra per neurosurgery recommendations  We will place on sliding scale insulin for  tight glycemic control due to steroid administration  Restart home dose atorvastatin  Restart home dose metoprolol  We will hold diuretics at this time due to acute kidney injury on chronic kidney disease    PUD prophylaxis, Protonix  DVT prophylaxis with SCDs, hold chemical anticoagulation until surgical evaluation.  Restart home dose Eliquis when clinically appropriate  Nutrition: Advance per surgery recommendations    See orders. The plan was discussed with the patient and his wife    This document has been electronically signed by  FALGUNI Peng  5:46 PM  Addendum  I have seen this patient independently and reviewed the medical records, labs and imaging and I agree with the note above as scribed for me by FALGUNI. I have corrected the note above for accuracy.  Patient presented with left sided weakness and numbness. MRI showed right side mass. Admitted for neurosurg eval, decadron as per them. Discussed with patient. Keep in ICU. Neuro checks frequent.   Kimberlyn Pittman MD  1/4/2020  6:08 PM

## 2020-01-04 NOTE — ED PROVIDER NOTES
Subjective   74-year-old male presents with left-sided paresthesias.  Patient states he has noticed this for about 1 month.  He states symptoms have been constant.  He reports a burning sensation in his left arm.  He states his left leg feels cold although it is not cool to the touch.  He denies any weakness.  He denies any chest pain or shortness of breath.  He does report a headache intermittently over the past month as well.  He denies any alleviating or exacerbating factors.      History provided by:  Patient      Review of Systems   Constitutional: Negative for fatigue and fever.   HENT: Negative for congestion and sore throat.    Eyes: Negative for pain and redness.   Respiratory: Negative for cough and shortness of breath.    Cardiovascular: Negative for chest pain and palpitations.   Gastrointestinal: Negative for abdominal pain and vomiting.   Genitourinary: Negative for dysuria and frequency.   Musculoskeletal: Negative for back pain.   Skin: Negative for rash.   Neurological: Positive for numbness and headaches. Negative for weakness.   Psychiatric/Behavioral: Negative for behavioral problems and confusion.       Past Medical History:   Diagnosis Date   • 3-vessel CAD 04/15/2019    Severe Involving Proximal LAD/LCX--Noted on Cardiac Cath    • Allergic rhinitis 04/2018   • Angina pectoris (CMS/HCC) Chronic   • Bilateral renal cysts 04/15/2019    Noted on Renal US   • Bronchitis 01/2017   • CAD (coronary artery disease) 11/17/2016    Involving the LAD/LCX--Noted on Cardiac Cath   • Cardiomegaly 11/16/16 & 02/06/2017 & 4/19/19-XR    Noted on Chest XR & CT Chest   • Chronic fatigue    • Colon cancer (CMS/HCC) Dx in 2000    Stage 2 w/Chemo--S/p Sx on 06/30/11    • Coronary artery calcification    • Cyst of left kidney 04/15/2019    1.7CM--Noted on Renal US   • DVT (deep venous thrombosis) (CMS/HCC) Hx   • Dyspnea 04/2019   • Elevated serum creatinine 4/15/19-[facility] IP   • Enlarged prostate 04/15/2019    Noted on  Renal US   • First degree AV block 08/09/2012    Noted on EKG   • GERD (gastroesophageal reflux disease)     Prilosec   • History of cancer chemotherapy 2011    Colon Ca   • History of chest x-ray 4/19/19-[facility]    L Midlung Granulomas; Stable Cardiomegaly   • History of E[facility]/12[facility]    First Degree AV Block; Premature Atrial Contractions   • History of EKG 4/2019-[facility]   • History of EKG 6/25/11-[facility]    L Axis Deviations; Probable Lateral Infarct; Borderline AV Conduction Delay   • History of nuclear stress test 11/16/16-[facility]    Inferoapocal Wall Ischemia Noted   • Hx of chest x-ray 11/16/16-[facility]    Normal w/Stable Cardiomegaly   • Hx of colonic polyps 06/24/2011    Noted on Colonoscopy   • Hyperbilirubinemia Hx   • Hypertension     Controlled w/Meds   • LAD stenosis 11/17/2016    40-50% LAD Disease-First Diagonal is Small w/60-70% Disease--Noted on Cardiac Cath    • LAD stenosis 04/15/2019    70% Proximal LAD Stenosis; 90% Diagonal; 80-90% First Marginal Stenosis; 2nd Marginal is Large with 60-70% Disease--Noted on Cardiac Cath    • Left axis deviation 06/25/2011    Noted on EKG   • Lower extremity edema 2017   • 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    Noted on Chest XR   • Megaloblastic anemia due to B12 deficiency    • Mixed hyperlipidemia    • Obesity    • Osteoarthritis     Knee   • Paroxysmal A-fib (CMS/HCC) Dx 11/2016    Eliquis   • PE (pulmonary thromboembolism) (CMS/HCC) Hx   • Pulmonary nodule, right 02/06/2017    8MM--Noted on CT Chest   • Pulmonary nodules 01/27/17 & 02/06/2017    Several--Noted on CT Chest Measuring 2-3MM   • RCA occlusion (CMS/HCC) 11/17/2016    50% Proximal Disease--Noted on Cardiac Cath    • RCA occlusion (CMS/HCC) 04/15/2019    70% Ostial Stenosis; Ostial the PDA to Proximal PDA Has 90% Disease--Noted on Cardiac Cath   • Renal cyst 04/15/2019    Noted on Renal US--R Measuring 6.8CM   • SOB (shortness of breath) 2016   • Vitamin B 12 deficiency    • Vitamin D deficiency        No Known  "Allergies    Past Surgical History:   Procedure Laterality Date   • CARDIAC CATHETERIZATION Left 11/17/16-[facility]    w/Arteriography/Angiography--Dr. Og--Nonobstructive CAD Involving the LAD & LCX   • CARDIAC CATHETERIZATION Left 4/15/19-[facility]    w/Arteriography/Angiography--Dr. Og--Severe 3V CAD   • COLON RESECTION  2000    Colon Ca   • COLON SURGERY  06/25/2011    Lap L Hemicolectomy w/Transverse Sigmoid Side to Side Anastomaosis   • COLONOSCOPY W/ POLYPECTOMY  06/24/2011 & 05/2012   • CORONARY ARTERY BYPASS GRAFT  04/24/2019    X4  Dr Bermudez   • MAZE PROCEDURE  04/23/2019    Dr Bermudez   • OTHER SURGICAL HISTORY  6/30/11-[facility]    L Subclavian Mediport Placement w/Fluoroscopy Interpretation--Akila Cartagena MD--For Stage 2 Colon Cancer   • OTHER SURGICAL HISTORY  8/9/12-[facility]    Removal of L Subclavian Mediport--kAila Cartagena MD--For Stage 1 Colon Cancer/Chemo       Family History   Problem Relation Age of Onset   • Heart failure Mother        Social History     Socioeconomic History   • Marital status:      Spouse name: Sujatha   • Number of children: Not on file   • Years of education: Not on file   • Highest education level: Not on file   Occupational History   • Occupation: RETIRED   Tobacco Use   • Smoking status: Former Smoker     Types: Cigarettes   • Smokeless tobacco: Never Used   • Tobacco comment: Smoked x 15 Yrs--Quit in 1982   Substance and Sexual Activity   • Alcohol use: No     Comment: Daily Caffeine    • Drug use: No   • Sexual activity: Defer       /61   Pulse 77   Temp 97.6 °F (36.4 °C) (Oral)   Resp 18   Ht 185.4 cm (73\")   Wt 120 kg (265 lb 3.4 oz)   SpO2 99%   BMI 34.99 kg/m²       Objective   Physical Exam   Constitutional: He is oriented to person, place, and time. He appears well-developed and well-nourished.   HENT:   Head: Normocephalic and atraumatic.   Eyes: Pupils are equal, round, and reactive to light. EOM are normal.   Neck: Normal range of motion. Neck supple. "   Cardiovascular: Normal rate, regular rhythm and normal heart sounds.   Pulmonary/Chest: Effort normal and breath sounds normal. No respiratory distress.   Abdominal: Soft. Bowel sounds are normal. There is no tenderness.   Musculoskeletal: Normal range of motion.   Left arm and left leg are both warm with good distal pulses and good cap refill   Neurological: He is alert and oriented to person, place, and time. He has normal strength. No cranial nerve deficit or sensory deficit.   Skin: Skin is warm and dry.   Nursing note and vitals reviewed.      Procedures           ED Course      My interpretation of EKG shows sinus rhythm, rate of 60, first-degree AV block, no ST elevation.     Results for orders placed or performed during the hospital encounter of 01/04/20   Basic Metabolic Panel   Result Value Ref Range    Glucose 126 (H) 65 - 99 mg/dL    BUN 26 (H) 8 - 23 mg/dL    Creatinine 1.75 (H) 0.76 - 1.27 mg/dL    Sodium 139 136 - 145 mmol/L    Potassium 3.3 (L) 3.5 - 5.2 mmol/L    Chloride 97 (L) 98 - 107 mmol/L    CO2 26.0 22.0 - 29.0 mmol/L    Calcium 9.9 8.6 - 10.5 mg/dL    eGFR Non African Amer 39 (L) >60 mL/min/1.73    BUN/Creatinine Ratio 14.9 7.0 - 25.0    Anion Gap 16.0 (H) 5.0 - 15.0 mmol/L   Protime-INR   Result Value Ref Range    Protime 12.0 (H) 9.6 - 11.7 Seconds    INR 1.18 (H) 0.90 - 1.10   aPTT   Result Value Ref Range    PTT 26.3 24.0 - 31.0 seconds   Troponin   Result Value Ref Range    Troponin T 0.012 0.000 - 0.030 ng/mL   CBC Auto Differential   Result Value Ref Range    WBC 7.80 3.40 - 10.80 10*3/mm3    RBC 5.25 4.14 - 5.80 10*6/mm3    Hemoglobin 14.7 13.0 - 17.7 g/dL    Hematocrit 43.4 37.5 - 51.0 %    MCV 82.6 79.0 - 97.0 fL    MCH 28.0 26.6 - 33.0 pg    MCHC 33.9 31.5 - 35.7 g/dL    RDW 17.0 (H) 12.3 - 15.4 %    RDW-SD 49.9 37.0 - 54.0 fl    MPV 8.2 6.0 - 12.0 fL    Platelets 233 140 - 450 10*3/mm3    Neutrophil % 70.5 42.7 - 76.0 %    Lymphocyte % 15.9 (L) 19.6 - 45.3 %    Monocyte % 12.7  (H) 5.0 - 12.0 %    Eosinophil % 0.4 0.3 - 6.2 %    Basophil % 0.5 0.0 - 1.5 %    Neutrophils, Absolute 5.50 1.70 - 7.00 10*3/mm3    Lymphocytes, Absolute 1.20 0.70 - 3.10 10*3/mm3    Monocytes, Absolute 1.00 (H) 0.10 - 0.90 10*3/mm3    Eosinophils, Absolute 0.00 0.00 - 0.40 10*3/mm3    Basophils, Absolute 0.00 0.00 - 0.20 10*3/mm3    nRBC 0.2 0.0 - 0.2 /100 WBC     Mri Brain With & Without Contrast    Result Date: 1/4/2020    1.  Contrast enhancing 2.0 x 1.7 cm mass within the right thalamus. Differential considerations would include glioma or metastatic disease. Glioma is favored.  There is mass effect on the third ventricle but without definite hydrocephalus at this time.  Electronically Signed By-Andi Hill On:1/4/2020 11:41 AM This report was finalized on [id number] by  Andi Hill, .    Xr Chest 1 View    Result Date: 1/4/2020   1. Borderline cardiomegaly. 2. No active pulmonary disease.  Electronically Signed By-Julio Schaeffer On:1/4/2020 10:20 AM This report was finalized on [id number] by  Julio Schaeffer, .                                        MDM   Patient had the above evaluation.  Results were discussed with the patient.  MRI of the brain is showing a contrast-enhancing mass in the right thalamus measuring 2.0 x 1.7 cm.  This could be metastatic disease or glioma.  Radiologist favors glioma.  Patient does have a history of colon cancer 9 years ago.  I discussed with neurosurgery on-call who recommends Keppra and Decadron.  These orders were placed.  I discussed with the nurse practitioner on-call for the intensivist and the patient will be admitted to the ICU.      Final diagnoses:   Mass of brain   Paresthesias            Ej Bloom MD  01/04/20 [id number]

## 2020-01-04 NOTE — ED NOTES
Pt sitting up abed, wife at bedside, pt denies needs; no s/s distress.     Lelo Antunez, RN  01/04/20 [id number]

## 2020-01-05 NOTE — CONSULTS
Consults    Patient Care Team:  Quyen Headley as PCP - General  Quyen Headley as PCP - Family Medicine    Chief complaint: Left-sided numbness tingling and burning sensation due to a right-sided brain tumor    Subjective     I am covering for Dr. Canela.  I was asked to see this patient by the emergency room physician.  He is a very pleasant man.  I saw him in the ICU with his wife.  He is a retired  and is done some farming.  He stopped smoking 30 years ago.  He did have a history of colon cancer treated in 2009 but he has been actually quite stable with it.  Over the last few months and particularly in the last few days he has been experiencing some burning sensation almost pain in the left side of his face, left arm, left leg and left side of his body.  He does have a history of coronary artery disease and he contacted his cardiologist who told him to go to the emergency room to be worked up for a stroke.  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  I instructed them to put him on Keppra and steroids and he was put in the ICU.  He does not really have any motor deficits and is completely awake and alert.  The mass is in the thalamus predominantly but it abuts the midbrain.  He has no extraocular movement disorder and no problems with swallowing.  He was in bed and I actually did not get him to walk but he has no drift or any obvious motor deficit.  I told him the nature of this problem that were dealing with either a metastatic tumor or a primary glioma.  We will get a CT scan of the chest abdomen pelvis.  If there is an area that is more easily accessible to biopsy in the chest abdomen pelvis then that would be the preferred area to get tissue to make a diagnosis but if there is none then he will need a brain biopsy.  Dr. Canela is back in town tomorrow and  is aware of this patient.  We will get the process started.  He was on Eliquis and that is been stopped.      Review of Systems   Constitutional: Negative.    HENT: Negative.    Eyes: Negative.    Respiratory: Negative.    Cardiovascular: Negative.    Gastrointestinal: Negative.    Endocrine: Negative.    Genitourinary: Negative.    Musculoskeletal: Negative.    Skin: Negative.    Allergic/Immunologic: Negative.    Neurological: Positive for numbness.   Hematological: Negative.    Psychiatric/Behavioral: Negative.         Past Medical History:   Diagnosis Date   • 3-vessel CAD 04/15/2019    Severe Involving Proximal LAD/LCX--Noted on Cardiac Cath    • Allergic rhinitis 04/2018   • Angina pectoris (CMS/HCC) Chronic   • Bilateral renal cysts 04/15/2019    Noted on Renal US   • Bronchitis 01/2017   • CAD (coronary artery disease) 11/17/2016    Involving the LAD/LCX--Noted on Cardiac Cath   • Cardiomegaly 11/16/16 & 02/06/2017 & 4/19/19-XR    Noted on Chest XR & CT Chest   • Chronic fatigue    • Colon cancer (CMS/HCC) Dx in 2000    Stage 2 w/Chemo--S/p Sx on 06/30/11    • Coronary artery calcification    • Cyst of left kidney 04/15/2019    1.7CM--Noted on Renal US   • DVT (deep venous thrombosis) (CMS/HCC) Hx   • Dyspnea 04/2019   • Elevated serum creatinine 4/15/19-[facility] IP   • Enlarged prostate 04/15/2019    Noted on Renal US   • First degree AV block 08/09/2012    Noted on EKG   • GERD (gastroesophageal reflux disease)     Prilosec   • History of cancer chemotherapy 2011    Colon Ca   • History of chest x-ray 4/19/19-[facility]    L Midlung Granulomas; Stable Cardiomegaly   • History of EKG 08/9/12-[facility]    First Degree AV Block; Premature Atrial Contractions   • History of EKG 4/2019-F   • History of EKG 6/25/11-[facility]    L Axis Deviations; Probable Lateral Infarct; Borderline AV Conduction Delay   • History of nuclear stress test 11/16/16-[facility]    Inferoapocal Wall Ischemia Noted   • Hx of chest x-ray 11/16/16-[facility]    Normal  w/Stable Cardiomegaly   • Hx of colonic polyps 06/24/2011    Noted on Colonoscopy   • Hyperbilirubinemia Hx   • Hypertension     Controlled w/Meds   • Kidney disease, chronic, stage III (GFR 30-59 ml/min) (CMS/[facility])    • LAD stenosis 11/17/2016    40-50% LAD Disease-First Diagonal is Small w/60-70% Disease--Noted on Cardiac Cath    • LAD stenosis 04/15/2019    70% Proximal LAD Stenosis; 90% Diagonal; 80-90% First Marginal Stenosis; 2nd Marginal is Large with 60-70% Disease--Noted on Cardiac Cath    • Left axis deviation 06/25/2011    Noted on EKG   • Lower extremity edema 2017   • 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    Noted on Chest XR   • Megaloblastic anemia due to B12 deficiency    • Mixed hyperlipidemia    • Obesity    • Osteoarthritis     Knee   • Paroxysmal A-fib (CMS/[facility]) Dx 11/2016    Eliquis   • PE (pulmonary thromboembolism) (CMS/HCC) Hx   • Pulmonary nodule, right 02/06/2017    8MM--Noted on CT Chest   • Pulmonary nodules 01/27/17 & 02/06/2017    Several--Noted on CT Chest Measuring 2-3MM   • RCA occlusion (CMS/[facility]) 11/17/2016    50% Proximal Disease--Noted on Cardiac Cath    • RCA occlusion (CMS/[facility]) 04/15/2019    70% Ostial Stenosis; Ostial the PDA to Proximal PDA Has 90% Disease--Noted on Cardiac Cath   • Renal cyst 04/15/2019    Noted on Renal US--R Measuring 6.8CM   • SOB (shortness of breath) 2016   • Vitamin B 12 deficiency    • Vitamin D deficiency    ,   Past Surgical History:   Procedure Laterality Date   • CARDIAC CATHETERIZATION Left 11/17/16-[facility]    w/Arteriography/Angiography--Dr. Og--Nonobstructive CAD Involving the LAD & LCX   • CARDIAC CATHETERIZATION Left 4/15/19-[facility]    w/Arteriography/Angiography--Dr. Og--Severe 3V CAD   • COLON RESECTION  2000    Colon Ca   • COLON SURGERY  06/25/2011    Lap L Hemicolectomy w/Transverse Sigmoid Side to Side Anastomaosis   • COLONOSCOPY W/ POLYPECTOMY  06/24/2011 & 05/2012   • CORONARY ARTERY BYPASS GRAFT  04/24/2019    X4  Dr Bermudez   • MAZE  PROCEDURE  04/23/2019    Dr Bermudez   • OTHER SURGICAL HISTORY  6/30/11-[facility]    L Subclavian Mediport Placement w/Fluoroscopy Interpretation--Akila Cartagena MD--For Stage 2 Colon Cancer   • OTHER SURGICAL HISTORY  8/9/12-[facility]    Removal of L Subclavian Mediport--Akila Cartagena MD--For Stage 1 Colon Cancer/Chemo   ,   Family History   Problem Relation Age of Onset   • Heart failure Mother    ,   Social History     Tobacco Use   • Smoking status: Former Smoker     Types: Cigarettes   • Smokeless tobacco: Never Used   • Tobacco comment: Smoked x 15 Yrs--Quit in 1982   Substance Use Topics   • Alcohol use: No     Comment: Daily Caffeine    • Drug use: No   ,   Medications Prior to Admission   Medication Sig Dispense Refill Last Dose   • allopurinol (ZYLOPRIM) 100 MG tablet Take 200 mg by mouth Daily.   1/4/2020 at Unknown time   • apixaban (ELIQUIS) 5 MG tablet tablet Take 1 tablet by mouth 2 (Two) Times a Day. 56 tablet 0 1/4/2020 at Unknown time   • aspirin 81 MG chewable tablet Chew 81 mg Daily.   1/4/2020 at Unknown time   • atorvastatin (LIPITOR) 20 MG tablet Take 20 mg by mouth Every Night.   1/3/2020 at Unknown time   • Cholecalciferol (VITAMIN D3) 50 MCG (2000 UT) capsule Take 2,000 Units by mouth Daily.   1/4/2020 at Unknown time   • fluticasone (FLONASE) 50 MCG/ACT nasal spray 1 spray into the nostril(s) as directed by provider Daily As Needed.   Taking   • furosemide (LASIX) 40 MG tablet Take 40 mg by mouth Daily.   Taking   • levoFLOXacin (LEVAQUIN) 750 MG tablet Take 750 mg by mouth Daily.      • metoprolol tartrate (LOPRESSOR) 25 MG tablet Take 25 mg by mouth 2 (Two) Times a Day.   1/4/2020 at Unknown time   • omeprazole (priLOSEC) 40 MG capsule Take 40 mg by mouth Daily As Needed.   Taking   • potassium chloride (K-DUR,KLOR-CON) 10 MEQ CR tablet Take 10 mEq by mouth Daily.   1/4/2020 at Unknown time   • triamterene-hydrochlorothiazide (MAXZIDE) 75-50 MG per tablet Take 1 tablet by mouth Daily.   1/4/2020 at  Unknown time   • vitamin B-12 (CYANOCOBALAMIN) 1000 MCG tablet Take 1,000 mcg by mouth Daily.   1/4/2020 at Unknown time   , Scheduled Meds:    atorvastatin 20 mg Oral Nightly   dexamethasone 4 mg Intravenous Q6H   fluticasone 1 spray Nasal Daily   insulin lispro 0-9 Units Subcutaneous 4x Daily With Meals & Nightly   levETIRAcetam 500 mg Intravenous Q12H   metoprolol tartrate 25 mg Oral BID   pantoprazole 40 mg Intravenous Q AM   sodium chloride 10 mL Intravenous Q12H   , Continuous Infusions:   , PRN Meds:  dextrose  •  dextrose  •  glucagon (human recombinant)  •  insulin lispro **AND** insulin lispro  •  ondansetron **OR** ondansetron  •  oxyCODONE  •  [COMPLETED] Insert peripheral IV **AND** sodium chloride  •  sodium chloride and Allergies:  Patient has no known allergies.    Objective      Vital Signs  Temp:  [97.5 °F (36.4 °C)-98.4 °F (36.9 °C)] 97.5 °F (36.4 °C)  Heart Rate:  [] 61  Resp:  [17-19] 19  BP: ()/(12-94) 154/85    Physical Exam   Constitutional: He is oriented to person, place, and time. He appears well-developed and well-nourished.   HENT:   Head: Normocephalic and atraumatic.   Eyes: Pupils are equal, round, and reactive to light. Conjunctivae are normal.   Fundoscopic exam:       The right eye shows no papilledema. The right eye shows venous pulsations.        The left eye shows no papilledema. The left eye shows venous pulsations.   Neck: Normal range of motion. Neck supple. Carotid bruit is not present.   Cardiovascular: Normal rate.   Pulmonary/Chest: Effort normal.   Abdominal: Soft.   Musculoskeletal: Normal range of motion.   Neurological: He is alert and oriented to person, place, and time. He has normal strength. He displays normal reflexes. No cranial nerve deficit or sensory deficit. He exhibits normal muscle tone. He displays no seizure activity. Coordination normal. GCS eye subscore is 4. GCS verbal subscore is 5. GCS motor subscore is 6.   Reflex Scores:       Tricep  reflexes are 2+ on the right side and 2+ on the left side.       Bicep reflexes are 2+ on the right side and 2+ on the left side.       Brachioradialis reflexes are 2+ on the right side and 2+ on the left side.       Patellar reflexes are 2+ on the right side and 2+ on the left side.       Achilles reflexes are 2+ on the right side and 2+ on the left side.  Skin: Skin is warm and dry.       Results Review:    I reviewed the patient's new clinical results.  I reviewed the patient's new imaging results and agree with the interpretation.  I reviewed the patient's other test results and agree with the interpretation  Study Result     MRI BRAIN W WO CONTRAST-     Date of Exam: 1/4/2020 10:14 AM     Indication: left sided paresthesias.     Technique: Technique: Routine multiplanar multisequence MR imaging of  the brain was performed without and with the administration of 20 ml of  ProHance  gadolinium contrast.        Comparison Exams: None available.     FINDINGS:  Diffusion-weighted images demonstrate no acute infarcts.  There is no  evidence of acute intracranial hemorrhage.  There is mild generalized  parenchymal volume loss.  Within the right thalamus there is a 2.0 x 1.7  cm mass with a moderate amount of surrounding vasogenic edema.  This  mass has decreased T1 and T2 signal.  Postcontrast images demonstrate  nodular peripheral contrast enhancement.  There are also multiple linear  areas of internal enhancement.  Differential considerations would  include glioma or metastatic disease.  Glioma is favored.  There is mild  mass effect on the third ventricle.  There is no definite hydro-Cephalus  at this time.  No other contrast enhancing lesions identified.  There  are no abnormal extra-axial fluid collections.  The major intracranial  vascular flow voids are preserved.  Within the subcortical white matter  of the right frontal lobe there is a focal area of increased T2 signal  most likely due to chronic ischemic  change.  There is no associated  contrast enhancement.     Globes and orbits are within normal limits.  There is a polyp or mucous  retention cyst within the left maxillary sinus.  Other visualized  paranasal sinuses and mastoid air cells are clear.  Sella and  suprasellar cistern are within normal limits.  Craniovertebral junction  appears normal.     \           IMPRESSION:        1.  Contrast enhancing 2.0 x 1.7 cm mass within the right thalamus.   Differential considerations would include glioma or metastatic disease.   Glioma is favored.  There is mass effect on the third ventricle but  without definite hydrocephalus at this time.     Electronically Signed By-Andi Hill On:1/4/2020 11:41 AM  This report was finalized on [id number] by  Andi Hill, .       Lab Results   Component Value Date    GLUCOSE 226 (H) 01/05/2020    BUN 32 (H) 01/05/2020    CREATININE 1.59 (H) 01/05/2020    EGFRIFNONA 43 (L) 01/05/2020    EGFRIFAFRI >60 01/27/2017    BCR 20.1 01/05/2020    K 3.5 01/05/2020    CO2 24.0 01/05/2020    CALCIUM 9.9 01/05/2020    ALBUMIN 4.10 12/05/2019    LABIL2 1.1 04/27/2019    AST 27 12/05/2019    ALT 31 12/05/2019     WBC   Date Value Ref Range Status   01/05/2020 10.20 3.40 - 10.80 10*3/mm3 Final     RBC   Date Value Ref Range Status   01/05/2020 5.21 4.14 - 5.80 10*6/mm3 Final     Hemoglobin   Date Value Ref Range Status   01/05/2020 14.9 13.0 - 17.7 g/dL Final     Hematocrit   Date Value Ref Range Status   01/05/2020 43.7 37.5 - 51.0 % Final     MCV   Date Value Ref Range Status   01/05/2020 83.8 79.0 - 97.0 fL Final     MCH   Date Value Ref Range Status   01/05/2020 28.6 26.6 - 33.0 pg Final     MCHC   Date Value Ref Range Status   01/05/2020 34.1 31.5 - 35.7 g/dL Final     RDW   Date Value Ref Range Status   01/05/2020 16.9 (H) 12.3 - 15.4 % Final     RDW-SD   Date Value Ref Range Status   01/05/2020 49.4 37.0 - 54.0 fl Final     MPV   Date Value Ref Range Status   01/05/2020 8.8 6.0 - 12.0  fL Final     Platelets   Date Value Ref Range Status   01/05/2020 240 140 - 450 10*3/mm3 Final     Neutrophil %   Date Value Ref Range Status   01/05/2020 89.6 (H) 42.7 - 76.0 % Final     Lymphocyte %   Date Value Ref Range Status   01/05/2020 7.4 (L) 19.6 - 45.3 % Final     Monocyte %   Date Value Ref Range Status   01/05/2020 2.7 (L) 5.0 - 12.0 % Final     Eosinophil %   Date Value Ref Range Status   01/05/2020 0.0 (L) 0.3 - 6.2 % Final     Basophil %   Date Value Ref Range Status   01/05/2020 0.3 0.0 - 1.5 % Final     Neutrophils, Absolute   Date Value Ref Range Status   01/05/2020 9.20 (H) 1.70 - 7.00 10*3/mm3 Final     Lymphocytes, Absolute   Date Value Ref Range Status   01/05/2020 0.80 0.70 - 3.10 10*3/mm3 Final     Monocytes, Absolute   Date Value Ref Range Status   01/05/2020 0.30 0.10 - 0.90 10*3/mm3 Final     Eosinophils, Absolute   Date Value Ref Range Status   01/05/2020 0.00 0.00 - 0.40 10*3/mm3 Final     Basophils, Absolute   Date Value Ref Range Status   01/05/2020 0.00 0.00 - 0.20 10*3/mm3 Final     nRBC   Date Value Ref Range Status   01/05/2020 0.1 0.0 - 0.2 /100 WBC Final           Assessment/Plan       Mass of brain      Assessment:    Condition: In stable condition.  Unchanged.   (Paresthesias and a burning sensation in the left side of the body due to a right thalamic tumor, either metastatic or a primary glioma.  Has no motor deficits.  Is currently on steroids and anticonvulsants.  We will get a CT scan of the chest abdomen pelvis to see if there is any evidence of a primary tumor.  If there is evidence and it can be biopsied that would be the preferable site.  If not then he will need a brain biopsy.).     Plan:   (See above.   is back in town tomorrow.  The patient is off Eliquis in case a biopsy needs to be done.).       I discussed the patients findings and my recommendations with patient, family, nursing staff and consulting provider    Indio Gillette MD  01/05/20  10:33  AM    Time:

## 2020-01-05 NOTE — PLAN OF CARE
Problem: Patient Care Overview  Goal: Plan of Care Review  Outcome: Outcome(s) achieved  Flowsheets (Taken 1/5/2020 0430)  Progress: improving  Note:   Symptoms of pain, discomfort reported to be much improved. Numbness remains. Have yet to see what Neurosurgeon plan will be.     Problem: VTE, DVT and PE (Adult)  Goal: Signs and Symptoms of Listed Potential Problems Will be Absent, Minimized or Managed (VTE, DVT and PE)  Outcome: Ongoing (interventions implemented as appropriate)  Flowsheets (Taken 1/5/2020 0430)  Problems Assessed (VTE, DVT, PE): all  Problems Present (VTE, DVT, PE): none

## 2020-01-05 NOTE — PROGRESS NOTES
"KPA/PULM/CC PROGRESS NOTE       PATIENT IDENTIFICATION    Name: Portillo Pete Age: 74 y.o. Sex: male :  1945 MRN: [id number]    SUBJECTIVE    Denies any complaints, denies any headache or vomiting. Had ct chest and abdomen  OBJECTIVE    /85   Pulse 61   Temp 97.8 °F (36.6 °C)   Resp 19   Ht 185.4 cm (73\")   Wt 121 kg (266 lb 12.1 oz)   SpO2 99%   BMI 35.19 kg/m²   Intake/Output last 3 shifts:  I/O last 3 completed shifts:  In: 540 [P.O.:540]  Out: 300 [Urine:300]  Intake/Output this shift:  I/O this shift:  In: 240 [P.O.:240]  Out: -     General Appearance:  Alert, cooperative, no distress, appears stated age  Head:  Normocephalic, without obvious abnormality, atraumatic  Eyes:  PERRL, conjunctiva/corneas clear, EOM's intact     Neck:  Supple,  no adenopathy;      Lungs:   Clear to auscultation bilaterally, respirations unlabored  Chest wall:  No tenderness  Heart:  Regular rate and rhythm, S1 and S2 normal, no murmur, rub   or gallop  Abdomen:  Soft, non-tender, bowel sounds active all four quadrants,  no masses, no hepatomegaly, no splenomegaly  Extremities:  Extremities normal, no cyanosis or edema  Pulses: 2+ and symmetric all extremities  Skin:  No rashes or lesions  Neurologic:   Alert and oriented, no focal deficits    Scheduled Meds:  atorvastatin 20 mg Oral Nightly   dexamethasone 4 mg Intravenous Q6H   fluticasone 1 spray Nasal Daily   insulin lispro 0-9 Units Subcutaneous 4x Daily With Meals & Nightly   levETIRAcetam 500 mg Intravenous Q12H   metoprolol tartrate 25 mg Oral BID   [START ON 2020] pantoprazole 40 mg Oral Q AM   sodium chloride 10 mL Intravenous Q12H       Continuous Infusions:     PRN Meds:dextrose  •  dextrose  •  glucagon (human recombinant)  •  insulin lispro **AND** insulin lispro  •  ondansetron **OR** ondansetron  •  oxyCODONE  •  [COMPLETED] Insert peripheral IV **AND** sodium chloride  •  sodium chloride     Data Review:  Lab Results (last 24 hours)     " Procedure Component Value Units Date/Time    POC Glucose Once [[id number]]  (Abnormal) Collected:  01/05/20 1200    Specimen:  Blood Updated:  01/05/20 1206     Glucose 210 mg/dL      Comment: Serial Number: [id number]  [id number]       MRSA Screen, PCR - Swab, Nares [[id number]]  (Normal) Collected:  01/04/20 2236    Specimen:  Swab from Nares Updated:  01/05/20 0836     MRSA PCR No MRSA Detected    Basic Metabolic Panel [[id number]]  (Abnormal) Collected:  01/05/20 0507    Specimen:  Blood Updated:  01/05/20 0722     Glucose 226 mg/dL      BUN 32 mg/dL      Creatinine 1.59 mg/dL      Sodium 136 mmol/L      Potassium 3.5 mmol/L      Chloride 96 mmol/L      CO2 24.0 mmol/L      Calcium 9.9 mg/dL      eGFR Non African Amer 43 mL/min/1.73      BUN/Creatinine Ratio 20.1     Anion Gap 16.0 mmol/L     Narrative:       GFR Normal >60  Chronic Kidney Disease <60  Kidney Failure <15      Magnesium [[id number]]  (Normal) Collected:  01/05/20 0507    Specimen:  Blood Updated:  01/05/20 0657     Magnesium 2.3 mg/dL     Lipid Panel [[id number]]  (Abnormal) Collected:  01/05/20 0507    Specimen:  Blood Updated:  01/05/20 0657     Total Cholesterol 124 mg/dL      Triglycerides 55 mg/dL      HDL Cholesterol 38 mg/dL      LDL Cholesterol  75 mg/dL      VLDL Cholesterol 11 mg/dL      LDL/HDL Ratio 1.97    Narrative:       Cholesterol Reference Ranges  (U.S. Department of Health and Human Services ATP III Classifications)    Desirable          <200 mg/dL  Borderline High    200-239 mg/dL  High Risk          >240 mg/dL      Triglyceride Reference Ranges  (U.S. Department of Health and Human Services ATP III Classifications)    Normal           <150 mg/dL  Borderline High  150-199 mg/dL  High             200-499 mg/dL  Very High        >500 mg/dL    HDL Reference Ranges  (U.S. Department of Health and Human Services ATP III Classifcations)    Low     <40 mg/dl (major risk factor for CHD)  High    >60 mg/dl ('negative' risk factor for  CHD)        LDL Reference Ranges  (U.S. Department of Health and Human Services ATP III Classifcations)    Optimal          <100 mg/dL  Near Optimal     100-129 mg/dL  Borderline High  130-159 mg/dL  High             160-189 mg/dL  Very High        >189 mg/dL    CBC & Differential [[id number]] Collected:  01/05/20 0507    Specimen:  Blood Updated:  01/05/20 0552    Narrative:       The following orders were created for panel order CBC & Differential.  Procedure                               Abnormality         Status                     ---------                               -----------         ------                     CBC Auto Differential[[id number]]        Abnormal            Final result                 Please view results for these tests on the individual orders.    CBC Auto Differential [[id number]]  (Abnormal) Collected:  01/05/20 0507    Specimen:  Blood Updated:  01/05/20 0552     WBC 10.20 10*3/mm3      RBC 5.21 10*6/mm3      Hemoglobin 14.9 g/dL      Hematocrit 43.7 %      MCV 83.8 fL      MCH 28.6 pg      MCHC 34.1 g/dL      RDW 16.9 %      RDW-SD 49.4 fl      MPV 8.8 fL      Platelets 240 10*3/mm3      Neutrophil % 89.6 %      Lymphocyte % 7.4 %      Monocyte % 2.7 %      Eosinophil % 0.0 %      Basophil % 0.3 %      Neutrophils, Absolute 9.20 10*3/mm3      Lymphocytes, Absolute 0.80 10*3/mm3      Monocytes, Absolute 0.30 10*3/mm3      Eosinophils, Absolute 0.00 10*3/mm3      Basophils, Absolute 0.00 10*3/mm3      nRBC 0.1 /100 WBC     POC Glucose Once [[id number]]  (Abnormal) Collected:  01/04/20 1955    Specimen:  Blood Updated:  01/04/20 1956     Glucose 247 mg/dL      Comment: Serial Number: [id number]:  [id number]                Imaging:  Imaging Results (Last 72 Hours)     Procedure Component Value Units Date/Time    CT Chest Without Contrast [[id number]] Collected:  01/05/20 1241     Updated:  01/05/20 1310    Narrative:          DATE OF EXAM:  1/5/2020 12:13 PM     PROCEDURE:  CT CHEST WO  CONTRAST-, CT ABDOMEN PELVIS WO CONTRAST-     INDICATIONS:   Right thalamic mass, 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 Previous history of colon cancer and a long history  of tobacco abuse.     COMPARISON:   CT of the chest performed on 02/16/2017 and CT of the abdomen and pelvis  performed on 01/27/2017.     TECHNIQUE:  Routine transaxial slices were obtained through the chest, abdomen, and  pelvis without the administration of intravenous contrast. Reconstructed  coronal and sagittal images were also obtained. Automated exposure  control and iterative construction methods were used.      FINDINGS:  Chest: There is no interval change in the bilateral parenchymal lung  nodules. The largest nodule is located in the anterior segment of the  right upper lobe on image #59 measuring 9 mm. Given the long-term  stability, these are considered benign. There is a stable area benign  pleural thickening at the left posterior-lateral costophrenic sulcus.  There is a stable irregular density in the inferior lingula felt to  represent scarring. There are no layering pleural effusions. The patient  has mild emphysematous changes. There are no enlarged mediastinal lymph  nodes. The hilar regions are difficult to evaluate without contrast.  There are atherosclerotic vascular calcifications which includes  involvement of the coronary arteries. The patient has had a previous  CABG procedure. There are no suspicious osteolytic or sclerotic lesions  within the bony thorax.     Abdomen and pelvis: There is a hypodense mass in the upper pole of the  right kidney which has increased in size measuring 3.9 x 3.4 cm. There  is also a large round hypodense mass in the inferior pole of the right  kidney which has increased in size measuring 7.1 x 6.8 cm. These likely  represent benign renal cysts but are difficult to evaluate without  contrast. There is a round hyperdense mass in the posterior cortex of  the  left kidney measuring 2.2 x 2.1 cm felt to represent a complex cyst.  There is a similar-appearing cyst extending off the inferior pole of the  left kidney measuring 1.3 x 1.6 cm. There may be additional benign  cortical cysts in the left kidney. The left renal masses are also  difficult to characterize without contrast. There are no liver lesions  are suggested. The gallbladder, adrenal glands, pancreas, and spleen are  unremarkable. There are bowel sutures seen in the region of the splenic  flexure indicating a previous colonic resection surgery. There is mild  fecal retention. There is no abnormal bowel wall thickening. There are  no dilated loops of bowel to indicate an obstructive process. The  appendix is normal. The prostate gland is enlarged presumably  representing benign prostatic hypertrophy. I cannot completely exclude  prostate carcinoma. The prostate gland measures 6.1 x 5.1 cm in  diameter. There is thickening of the wall the urinary bladder which can  be seen with a chronic bladder outlet obstruction. The seminal vesicles  are normal. There are atherosclerotic vascular calcifications throughout  both the abdomen and pelvis. There are no enlarged lymph nodes. There is  no free fluid within the abdomen or pelvis. There are no suspicious  osteolytic or sclerotic lesions within the bony structures.       Impression:          1. Stable bilateral pulmonary nodules. Given the long-term stability,  these are felt to be benign.  2. Stable chronic pleural/parenchymal scarring in the left lung as  described.  3. Emphysema.  4. Bilateral renal masses. A few of the masses are hyperdense and are  felt to be similar to the previous exam. These are difficult to  characterize without contrast, but probably represent a combination of  Bosniak type I and II renal cysts.  5. 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 I cannot completely exclude prostate carcinoma.  6. Thickening of the  wall of the urinary bladder wall which is probably  due to chronic outlet obstruction.  7. Postsurgical changes involving the splenic flexure the colon. There  is fecal retention.     Electronically Signed By-Julio Schaeffer On:1/5/2020 1:07 PM  This report was finalized on [id number] by  Julio Schaeffer, .    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 Previous history of colon cancer and a long history  of tobacco abuse.     COMPARISON:   CT of the chest performed on 02/16/2017 and CT of the abdomen and pelvis  performed on 01/27/2017.     TECHNIQUE:  Routine transaxial slices were obtained through the chest, abdomen, and  pelvis without the administration of intravenous contrast. Reconstructed  coronal and sagittal images were also obtained. Automated exposure  control and iterative construction methods were used.      FINDINGS:  Chest: There is no interval change in the bilateral parenchymal lung  nodules. The largest nodule is located in the anterior segment of the  right upper lobe on image #59 measuring 9 mm. Given the long-term  stability, these are considered benign. There is a stable area benign  pleural thickening at the left posterior-lateral costophrenic sulcus.  There is a stable irregular density in the inferior lingula felt to  represent scarring. There are no layering pleural effusions. The patient  has mild emphysematous changes. There are no enlarged mediastinal lymph  nodes. The hilar regions are difficult to evaluate without contrast.  There are atherosclerotic vascular calcifications which includes  involvement of the coronary arteries. The patient has had a previous  CABG procedure. There are no suspicious  osteolytic or sclerotic lesions  within the bony thorax.     Abdomen and pelvis: There is a hypodense mass in the upper pole of the  right kidney which has increased in size measuring 3.9 x 3.4 cm. There  is also a large round hypodense mass in the inferior pole of the right  kidney which has increased in size measuring 7.1 x 6.8 cm. These likely  represent benign renal cysts but are difficult to evaluate without  contrast. There is a round hyperdense mass in the posterior cortex of  the left kidney measuring 2.2 x 2.1 cm felt to represent a complex cyst.  There is a similar-appearing cyst extending off the inferior pole of the  left kidney measuring 1.3 x 1.6 cm. There may be additional benign  cortical cysts in the left kidney. The left renal masses are also  difficult to characterize without contrast. There are no liver lesions  are suggested. The gallbladder, adrenal glands, pancreas, and spleen are  unremarkable. There are bowel sutures seen in the region of the splenic  flexure indicating a previous colonic resection surgery. There is mild  fecal retention. There is no abnormal bowel wall thickening. There are  no dilated loops of bowel to indicate an obstructive process. The  appendix is normal. The prostate gland is enlarged presumably  representing benign prostatic hypertrophy. I cannot completely exclude  prostate carcinoma. The prostate gland measures 6.1 x 5.1 cm in  diameter. There is thickening of the wall the urinary bladder which can  be seen with a chronic bladder outlet obstruction. The seminal vesicles  are normal. There are atherosclerotic vascular calcifications throughout  both the abdomen and pelvis. There are no enlarged lymph nodes. There is  no free fluid within the abdomen or pelvis. There are no suspicious  osteolytic or sclerotic lesions within the bony structures.       Impression:          1. Stable bilateral pulmonary nodules. Given the long-term stability,  these are felt to be  benign.  2. Stable chronic pleural/parenchymal scarring in the left lung as  described.  3. Emphysema.  4. Bilateral renal masses. A few of the masses are hyperdense and are  felt to be similar to the previous exam. These are difficult to  characterize without contrast, but probably represent a combination of  Bosniak type I and II renal cysts.  5. 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 I cannot completely exclude prostate carcinoma.  6. Thickening of the wall of the urinary bladder wall which is probably  due to chronic outlet obstruction.  7. Postsurgical changes involving the splenic flexure the colon. There  is fecal retention.     Electronically Signed By-Julio Schaeffer On:1/5/2020 1:07 PM  This report was finalized on [id number] by  Julio Schaeffer, .    MRI Brain With & Without Contrast [[id number]] Collected:  01/04/20 1136     Updated:  01/04/20 1143    Narrative:       MRI BRAIN W WO CONTRAST-     Date of Exam: 1/4/2020 10:14 AM     Indication: left sided paresthesias.     Technique: Technique: Routine multiplanar multisequence MR imaging of  the brain was performed without and with the administration of 20 ml of  ProHance  gadolinium contrast.        Comparison Exams: None available.     FINDINGS:  Diffusion-weighted images demonstrate no acute infarcts.  There is no  evidence of acute intracranial hemorrhage.  There is mild generalized  parenchymal volume loss.  Within the right thalamus there is a 2.0 x 1.7  cm mass with a moderate amount of surrounding vasogenic edema.  This  mass has decreased T1 and T2 signal.  Postcontrast images demonstrate  nodular peripheral contrast enhancement.  There are also multiple linear  areas of internal enhancement.  Differential considerations would  include glioma or metastatic disease.  Glioma is favored.  There is mild  mass effect on the third ventricle.  There is no definite hydro-Cephalus  at this time.  No other contrast enhancing  lesions identified.  There  are no abnormal extra-axial fluid collections.  The major intracranial  vascular flow voids are preserved.  Within the subcortical white matter  of the right frontal lobe there is a focal area of increased T2 signal  most likely due to chronic ischemic change.  There is no associated  contrast enhancement.     Globes and orbits are within normal limits.  There is a polyp or mucous  retention cyst within the left maxillary sinus.  Other visualized  paranasal sinuses and mastoid air cells are clear.  Sella and  suprasellar cistern are within normal limits.  Craniovertebral junction  appears normal.     \             Impression:             1.  Contrast enhancing 2.0 x 1.7 cm mass within the right thalamus.   Differential considerations would include glioma or metastatic disease.   Glioma is favored.  There is mass effect on the third ventricle but  without definite hydrocephalus at this time.     Electronically Signed By-Andi Hill On:1/4/2020 11:41 AM  This report was finalized on [id number] by  Andi Hill, .    XR Chest 1 View [[id number]] Collected:  01/04/20 1019     Updated:  01/04/20 1022    Narrative:       DATE OF EXAM:  1/4/2020 9:45 AM     PROCEDURE:  XR CHEST 1 VW-     INDICATIONS:  LEFT arm paresthesias      COMPARISON:  04/26/2019.     TECHNIQUE:   Single radiographic view of the chest was obtained.     FINDINGS:  The heart is borderline enlarged with postsurgical changes apparent. The  pulmonary vascular markings are normal. The lungs and pleural spaces are  clear of active disease.  There are chronic age-related changes  involving the bony thorax and thoracic aorta.       Impression:          1. Borderline cardiomegaly.  2. No active pulmonary disease.     Electronically Signed By-Julio Schaeffer On:1/4/2020 10:20 AM  This report was finalized on [id number] by  Julio Schaeffer, .            ASSESSMENT/PLAN:     Mass of brain     Brain mass as above, new finding  Left-sided  paresthesias secondary to brain mass   History of colon cancer status post chemo and surgery 9 years ago  Hypertension  Acute kidney injury on chronic kidney disease  Hyperlipidemia  History of DVT/PE  Paroxysmal atrial fibrillation, chronic anticoagulation with Eliquis.  Currently sinus rhythm  Tobacco abuse in remission     PLAN    CT of the head reviewed as above  Neurosurgery consulted, plan to eval for any other primary noted; ct chest and abdomen reviewed, showed renal masses that are stable and felt to be benign , stable pulm nodules that are benign as well as prostate enlargement  Decadron and Keppra per neurosurgery recommendations  We will place on sliding scale insulin for tight glycemic control due to steroid administration  Restart home dose atorvastatin  Restart home dose metoprolol  We will hold diuretics at this time due to acute kidney injury on chronic kidney disease     PUD prophylaxis, Protonix  DVT prophylaxis with SCDs, hold chemical anticoagulation until surgical evaluation.  Restart home dose Eliquis when clinically appropriate  Nutrition: Advance per surgery recommendations     Kimberlyn Pittman MD  1/5/2020  2:10 PM

## 2020-01-06 NOTE — NURSING NOTE
Patient's wife administered the lovenox injection very well. He has been on lovenox injections last year and the wife was very familiar on how to do it.

## 2020-01-06 NOTE — PLAN OF CARE
I have seen and evaluated the patient.  He states that he has no double vision or weakness now.  This all improved after stopping the antibiotics and the chart and steroids.  He has no focal deficit that I can appreciate right now.  The lesion does appear to be more consistent with a probable primary neoplasm of the brain.  He does have a history of colon cancer in his chest abdomen pelvis imaging was negative.  I did tell him there is a remote chance this could be metastatic colon cancer.  Another potential pathology would be lymphoma though I think less likely.  Either way we will see if we can get him onto the operative schedule tomorrow.  If not we will need to try to plan to do him later this week or early next week.  If that is the case I do not feel that he needs to be in the hospital for this unless the primary team feels otherwise.  If he does go home and anticoagulation for his atrial fibrillation is valued appropriate then I would wish that he would go home on subcutaneous Lovenox and to stop 24 hours prior to the biopsy.  Today we reviewed the films and the procedure in detail.  I did discuss the risk of the procedure being bleeding, death, paralysis, infection, CSF leak, failure the procedure need for further procedures.  He indicates he understands and wishes to proceed.

## 2020-01-06 NOTE — PLAN OF CARE
Patient discharging home on lovenox and will be scheduled for surgery with Dr. Canela and come in outpatient. Wife at bedside. Lovenox administration provided with patient and wife.

## 2020-01-06 NOTE — THERAPY DISCHARGE NOTE
Patient Name: Portillo Pete  : 1945    MRN: [id number]                              Today's Date: 2020       Admit Date: 2020    Visit Dx:     ICD-10-CM ICD-9-CM   1. Mass of brain G93.89 348.89   2. Paresthesias R20.2 782.0     Patient Active Problem List   Diagnosis   • Mass of brain     Past Medical History:   Diagnosis Date   • 3-vessel CAD 04/15/2019    Severe Involving Proximal LAD/LCX--Noted on Cardiac Cath    • Allergic rhinitis 2018   • Angina pectoris (CMS/HCC) Chronic   • Bilateral renal cysts 04/15/2019    Noted on Renal US   • Bronchitis 2017   • CAD (coronary artery disease) 2016    Involving the LAD/LCX--Noted on Cardiac Cath   • Cardiomegaly 16 & 2017 & 19-XR    Noted on Chest XR & CT Chest   • Chronic fatigue    • Colon cancer (CMS/HCC) Dx in     Stage 2 w/Chemo--S/p Sx on 11    • Coronary artery calcification    • Cyst of left kidney 04/15/2019    1.7CM--Noted on Renal US   • DVT (deep venous thrombosis) (CMS/HCC) Hx   • Dyspnea 2019   • Elevated serum creatinine 4/15/19-[facility] IP   • Enlarged prostate 04/15/2019    Noted on Renal US   • First degree AV block 2012    Noted on EKG   • GERD (gastroesophageal reflux disease)     Prilosec   • History of cancer chemotherapy     Colon Ca   • History of chest x-ray 19-[facility]    L Midlung Granulomas; Stable Cardiomegaly   • History of EKG 12-[facility]    First Degree AV Block; Premature Atrial Contractions   • History of EKG 2019-[facility]   • History of EKG 11-[facility]    L Axis Deviations; Probable Lateral Infarct; Borderline AV Conduction Delay   • History of nuclear stress test 16-[facility]    Inferoapocal Wall Ischemia Noted   • Hx of chest x-ray 16-[facility]    Normal w/Stable Cardiomegaly   • Hx of colonic polyps 2011    Noted on Colonoscopy   • Hyperbilirubinemia Hx   • Hypertension     Controlled w/Meds   • Kidney disease, chronic, stage III (GFR 30-59 ml/min) (CMS/HCC)    • LAD  stenosis 11/17/2016    40-50% LAD Disease-First Diagonal is Small w/60-70% Disease--Noted on Cardiac Cath    • LAD stenosis 04/15/2019    70% Proximal LAD Stenosis; 90% Diagonal; 80-90% First Marginal Stenosis; 2nd Marginal is Large with 60-70% Disease--Noted on Cardiac Cath    • Left axis deviation 06/25/2011    Noted on EKG   • Lower extremity edema 2017   • 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    Noted on Chest XR   • Megaloblastic anemia due to B12 deficiency    • Mixed hyperlipidemia    • Obesity    • Osteoarthritis     Knee   • Paroxysmal A-fib (CMS/[facility]) Dx 11/2016    Eliquis   • PE (pulmonary thromboembolism) (CMS/[facility]) Hx   • Pulmonary nodule, right 02/06/2017    8MM--Noted on CT Chest   • Pulmonary nodules 01/27/17 & 02/06/2017    Several--Noted on CT Chest Measuring 2-3MM   • RCA occlusion (CMS/[facility]) 11/17/2016    50% Proximal Disease--Noted on Cardiac Cath    • RCA occlusion (CMS/[facility]) 04/15/2019    70% Ostial Stenosis; Ostial the PDA to Proximal PDA Has 90% Disease--Noted on Cardiac Cath   • Renal cyst 04/15/2019    Noted on Renal US--R Measuring 6.8CM   • SOB (shortness of breath) 2016   • Vitamin B 12 deficiency    • Vitamin D deficiency      Past Surgical History:   Procedure Laterality Date   • CARDIAC CATHETERIZATION Left 11/17/16-[facility]    w/Arteriography/Angiography--Dr. Og--Nonobstructive CAD Involving the LAD & LCX   • CARDIAC CATHETERIZATION Left 4/15/19-[facility]    w/Arteriography/Angiography--Dr. Og--Severe 3V CAD   • COLON RESECTION  2000    Colon Ca   • COLON SURGERY  06/25/2011    Lap L Hemicolectomy w/Transverse Sigmoid Side to Side Anastomaosis   • COLONOSCOPY W/ POLYPECTOMY  06/24/2011 & 05/2012   • CORONARY ARTERY BYPASS GRAFT  04/24/2019    X4  Dr Bermudez   • MAZE PROCEDURE  04/23/2019    Dr Bermudez   • OTHER SURGICAL HISTORY  6/30/11-[facility]    L Subclavian Mediport Placement w/Fluoroscopy Interpretation--Akila Cartagena MD--For Stage 2 Colon Cancer   • OTHER SURGICAL HISTORY  8/9/12-[facility]     Removal of L Subclavian Mediport--Akila Cartagena MD--For Stage 1 Colon Cancer/Chemo     General Information    No documentation.       Mobility    No documentation.       Obj/Interventions    No documentation.       Goals/Plan    No documentation.       Clinical Impression     Row Name 01/06/20 1322          Plan of Care Review    Outcome Summary  Patient scheduled for surgery later this week and is discharging home today. No deficits reported that require skilled Physical Therapy intervention at this time.   -CR       User Key  (r) = Recorded By, (t) = Taken By, (c) = Cosigned By    Initials Name Provider Type    CR Reyes, Carmela, PT Physical Therapist        Outcome Measures    No documentation.         PT Recommendation and Plan     Outcome Summary: Patient scheduled for surgery later this week and is discharging home today. No deficits reported that require skilled Physical Therapy intervention at this time.      Time Calculation:                   Carmela Reyes, CHRIS  1/6/2020

## 2020-01-06 NOTE — TELEPHONE ENCOUNTER
Call from ICU regarding patient being discharged home on Lovenox 120mg Q12hr instead of Eliquis due to patient's upcoming craniotomy for brain mass.

## 2020-01-06 NOTE — PROGRESS NOTES
"KPA/PULM/CC PROGRESS NOTE       PATIENT IDENTIFICATION    Name: Portillo Pete Age: 74 y.o. Sex: male :  1945 MRN: [id number]  74 y.o. male who presented to the emergency department for evaluation of a 1 month.  Of intermittent left sided paresthesias.  He reports that his left arm has had a burning sensation and his left leg feels cold although is not cold to the touch.  He denies generalized or unilateral weakness.  He denies recent fever, chills, nausea, vomiting, shortness of air, chest pain.  He reports occasional numbness and tingling of the fingers.  He reports that he has had an intermittent headache for 1 week with progressive worsening.  He does have a history of paroxysmal atrial fibrillation however is compliant with anticoagulation with Eliquis.     MRI of the brain obtained in the emergency department revealed a contrast-enhancing 2.0 x 1.7 cm mass within the right thalamus, differential considerations include glioma or metastatic disease.  There is mass-effect on the third ventricle but without definite hydrocephalus.  Neurosurgery was consulted by the emergency department physician and Keppra and Decadron have been initiated.  The patient will be admitted to the intensive care unit for close neuro monitoring and medical management.     Past medical history includes coronary artery disease, cardiomegaly, colon cancer status post chemo and surgery 9 years ago, DVT/PE, hypertension, chronic kidney disease, hyperlipidemia, obesity, paroxysmal atrial fibrillation with chronic anticoagulation with Eliquis, tobacco abuse in remission  SUBJECTIVE    ; Denies any complaints, denies any headache or vomiting. Had ct chest and abdomen  : Awake. No c/o SOA or CP, No N/V. No fever or chills.   OBJECTIVE    /57   Pulse 82   Temp 98.1 °F (36.7 °C)   Resp 19   Ht 185.4 cm (73\")   Wt 120 kg (265 lb 6.9 oz)   SpO2 98%   BMI 35.02 kg/m²   Intake/Output last 3 shifts:  I/O last 3 completed " shifts:  In: 1260 [P.O.:1260]  Out: 1275 [Urine:1275]  Intake/Output this shift:  No intake/output data recorded.    General Appearance:  Alert, cooperative, no distress, appears stated age  Head:  Normocephalic, without obvious abnormality, atraumatic  Eyes:  PERRL, conjunctiva/corneas clear, EOM's intact     Neck:  Supple,  no adenopathy;      Lungs:   Clear to auscultation bilaterally, respirations unlabored  Chest wall:  No tenderness  Heart:  Regular rate and rhythm, S1 and S2 normal, no murmur, rub   or gallop  Abdomen:  Soft, non-tender, bowel sounds active all four quadrants,  no masses, no hepatomegaly, no splenomegaly  Extremities:  Extremities normal, no cyanosis or edema  Pulses: 2+ and symmetric all extremities  Skin:  No rashes or lesions  Neurologic:   Alert and oriented, no focal deficits    Scheduled Meds:    atorvastatin 20 mg Oral Nightly   dexamethasone 4 mg Intravenous Q6H   fluticasone 1 spray Nasal Daily   insulin lispro 0-9 Units Subcutaneous 4x Daily With Meals & Nightly   levETIRAcetam 500 mg Intravenous Q12H   metoprolol tartrate 25 mg Oral BID   pantoprazole 40 mg Oral Q AM   sodium chloride 10 mL Intravenous Q12H       Continuous Infusions:     PRN Meds:bisacodyl  •  bisacodyl  •  dextrose  •  dextrose  •  docusate sodium  •  glucagon (human recombinant)  •  insulin lispro **AND** insulin lispro  •  ondansetron **OR** ondansetron  •  oxyCODONE  •  [COMPLETED] Insert peripheral IV **AND** sodium chloride  •  sodium chloride     Data Review:  Lab Results (last 24 hours)     Procedure Component Value Units Date/Time    POC Glucose Once [[id number]]  (Abnormal) Collected:  01/06/20 0710    Specimen:  Blood Updated:  01/06/20 0713     Glucose 141 mg/dL      Comment: Serial Number: [id number]:  [id number]       Basic Metabolic Panel [[id number]]  (Abnormal) Collected:  01/06/20 0403    Specimen:  Blood Updated:  01/06/20 0439     Glucose 158 mg/dL      BUN 36 mg/dL      Creatinine 1.50  mg/dL      Sodium 138 mmol/L      Potassium 3.9 mmol/L      Chloride 97 mmol/L      CO2 27.0 mmol/L      Calcium 9.7 mg/dL      eGFR Non African Amer 46 mL/min/1.73      BUN/Creatinine Ratio 24.0     Anion Gap 14.0 mmol/L     Narrative:       GFR Normal >60  Chronic Kidney Disease <60  Kidney Failure <15      Magnesium [[id number]]  (Normal) Collected:  01/06/20 0403    Specimen:  Blood Updated:  01/06/20 0439     Magnesium 2.4 mg/dL     CBC & Differential [[id number]] Collected:  01/06/20 0403    Specimen:  Blood Updated:  01/06/20 0411    Narrative:       The following orders were created for panel order CBC & Differential.  Procedure                               Abnormality         Status                     ---------                               -----------         ------                     CBC Auto Differential[[id number]]        Abnormal            Final result                 Please view results for these tests on the individual orders.    CBC Auto Differential [[id number]]  (Abnormal) Collected:  01/06/20 0403    Specimen:  Blood Updated:  01/06/20 0411     WBC 16.30 10*3/mm3      Comment: Result checked         RBC 5.27 10*6/mm3      Hemoglobin 15.1 g/dL      Hematocrit 44.0 %      MCV 83.5 fL      MCH 28.6 pg      MCHC 34.3 g/dL      RDW 17.1 %      RDW-SD 49.4 fl      MPV 7.9 fL      Platelets 271 10*3/mm3      Neutrophil % 89.4 %      Lymphocyte % 7.9 %      Monocyte % 2.3 %      Eosinophil % 0.0 %      Basophil % 0.4 %      Neutrophils, Absolute 14.60 10*3/mm3      Lymphocytes, Absolute 1.30 10*3/mm3      Monocytes, Absolute 0.40 10*3/mm3      Eosinophils, Absolute 0.00 10*3/mm3      Basophils, Absolute 0.10 10*3/mm3      nRBC 0.1 /100 WBC     POC Glucose Once [[id number]]  (Abnormal) Collected:  01/05/20 2135    Specimen:  Blood Updated:  01/05/20 2136     Glucose 193 mg/dL      Comment: Serial Number: [id number]:  [id number]       POC Glucose Once [[id number]]  (Abnormal) Collected:  01/05/20 1924     Specimen:  Blood Updated:  01/05/20 1938     Glucose 201 mg/dL      Comment: Serial Number: [id number]:  [id number]       POC Glucose Once [[id number]]  (Abnormal) Collected:  01/05/20 1732    Specimen:  Blood Updated:  01/05/20 1742     Glucose 164 mg/dL      Comment: Serial Number: [id number]:  [id number]       POC Glucose Once [[id number]]  (Abnormal) Collected:  01/05/20 1200    Specimen:  Blood Updated:  01/05/20 1206     Glucose 210 mg/dL      Comment: Serial Number: [id number]:  [id number]       MRSA Screen, PCR - Swab, Nares [[id number]]  (Normal) Collected:  01/04/20 2236    Specimen:  Swab from Nares Updated:  01/05/20 0836     MRSA PCR No MRSA Detected             Imaging:  Imaging Results (Last 72 Hours)     Procedure Component Value Units Date/Time    CT Chest Without Contrast [[id number]] Collected:  01/05/20 1241     Updated:  01/05/20 1310    Narrative:          DATE OF EXAM:  1/5/2020 12:13 PM     PROCEDURE:  CT CHEST WO CONTRAST-, CT ABDOMEN PELVIS WO CONTRAST-     INDICATIONS:   Right thalamic mass, 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 Previous history of colon cancer and a long history  of tobacco abuse.     COMPARISON:   CT of the chest performed on 02/16/2017 and CT of the abdomen and pelvis  performed on 01/27/2017.     TECHNIQUE:  Routine transaxial slices were obtained through the chest, abdomen, and  pelvis without the administration of intravenous contrast. Reconstructed  coronal and sagittal images were also obtained. Automated exposure  control and iterative construction methods were used.      FINDINGS:  Chest: There is no interval change in the bilateral parenchymal lung  nodules. The largest nodule is located in the anterior segment of the  right upper lobe on image #59 measuring 9 mm. Given the long-term  stability, these are considered benign. There is a stable area benign  pleural thickening at the left posterior-lateral  costophrenic sulcus.  There is a stable irregular density in the inferior lingula felt to  represent scarring. There are no layering pleural effusions. The patient  has mild emphysematous changes. There are no enlarged mediastinal lymph  nodes. The hilar regions are difficult to evaluate without contrast.  There are atherosclerotic vascular calcifications which includes  involvement of the coronary arteries. The patient has had a previous  CABG procedure. There are no suspicious osteolytic or sclerotic lesions  within the bony thorax.     Abdomen and pelvis: There is a hypodense mass in the upper pole of the  right kidney which has increased in size measuring 3.9 x 3.4 cm. There  is also a large round hypodense mass in the inferior pole of the right  kidney which has increased in size measuring 7.1 x 6.8 cm. These likely  represent benign renal cysts but are difficult to evaluate without  contrast. There is a round hyperdense mass in the posterior cortex of  the left kidney measuring 2.2 x 2.1 cm felt to represent a complex cyst.  There is a similar-appearing cyst extending off the inferior pole of the  left kidney measuring 1.3 x 1.6 cm. There may be additional benign  cortical cysts in the left kidney. The left renal masses are also  difficult to characterize without contrast. There are no liver lesions  are suggested. The gallbladder, adrenal glands, pancreas, and spleen are  unremarkable. There are bowel sutures seen in the region of the splenic  flexure indicating a previous colonic resection surgery. There is mild  fecal retention. There is no abnormal bowel wall thickening. There are  no dilated loops of bowel to indicate an obstructive process. The  appendix is normal. The prostate gland is enlarged presumably  representing benign prostatic hypertrophy. I cannot completely exclude  prostate carcinoma. The prostate gland measures 6.1 x 5.1 cm in  diameter. There is thickening of the wall the urinary bladder  which can  be seen with a chronic bladder outlet obstruction. The seminal vesicles  are normal. There are atherosclerotic vascular calcifications throughout  both the abdomen and pelvis. There are no enlarged lymph nodes. There is  no free fluid within the abdomen or pelvis. There are no suspicious  osteolytic or sclerotic lesions within the bony structures.       Impression:          1. Stable bilateral pulmonary nodules. Given the long-term stability,  these are felt to be benign.  2. Stable chronic pleural/parenchymal scarring in the left lung as  described.  3. Emphysema.  4. Bilateral renal masses. A few of the masses are hyperdense and are  felt to be similar to the previous exam. These are difficult to  characterize without contrast, but probably represent a combination of  Bosniak type I and II renal cysts.  5. 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 I cannot completely exclude prostate carcinoma.  6. Thickening of the wall of the urinary bladder wall which is probably  due to chronic outlet obstruction.  7. Postsurgical changes involving the splenic flexure the colon. There  is fecal retention.     Electronically Signed By-Julio Schaeffer On:1/5/2020 1:07 PM  This report was finalized on [id number] by  Julio Schaeffer, .    CT Abdomen Pelvis Without Contrast [[id number]] Collected:  01/05/20 1241     Updated:  01/05/20 1309    Narrative:          DATE OF EXAM:  1/5/2020 12:13 PM     PROCEDURE:  CT CHEST WO CONTRAST-, CT ABDOMEN PELVIS WO CONTRAST-     INDICATIONS:   Right thalamic mass, 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 Previous history of colon cancer and a long history  of tobacco abuse.     COMPARISON:   CT of the chest performed on 02/16/2017 and CT of the abdomen and pelvis  performed on 01/27/2017.     TECHNIQUE:  Routine transaxial slices were obtained through the chest, abdomen, and  pelvis without the administration of intravenous  contrast. Reconstructed  coronal and sagittal images were also obtained. Automated exposure  control and iterative construction methods were used.      FINDINGS:  Chest: There is no interval change in the bilateral parenchymal lung  nodules. The largest nodule is located in the anterior segment of the  right upper lobe on image #59 measuring 9 mm. Given the long-term  stability, these are considered benign. There is a stable area benign  pleural thickening at the left posterior-lateral costophrenic sulcus.  There is a stable irregular density in the inferior lingula felt to  represent scarring. There are no layering pleural effusions. The patient  has mild emphysematous changes. There are no enlarged mediastinal lymph  nodes. The hilar regions are difficult to evaluate without contrast.  There are atherosclerotic vascular calcifications which includes  involvement of the coronary arteries. The patient has had a previous  CABG procedure. There are no suspicious osteolytic or sclerotic lesions  within the bony thorax.     Abdomen and pelvis: There is a hypodense mass in the upper pole of the  right kidney which has increased in size measuring 3.9 x 3.4 cm. There  is also a large round hypodense mass in the inferior pole of the right  kidney which has increased in size measuring 7.1 x 6.8 cm. These likely  represent benign renal cysts but are difficult to evaluate without  contrast. There is a round hyperdense mass in the posterior cortex of  the left kidney measuring 2.2 x 2.1 cm felt to represent a complex cyst.  There is a similar-appearing cyst extending off the inferior pole of the  left kidney measuring 1.3 x 1.6 cm. There may be additional benign  cortical cysts in the left kidney. The left renal masses are also  difficult to characterize without contrast. There are no liver lesions  are suggested. The gallbladder, adrenal glands, pancreas, and spleen are  unremarkable. There are bowel sutures seen in the region  of the splenic  flexure indicating a previous colonic resection surgery. There is mild  fecal retention. There is no abnormal bowel wall thickening. There are  no dilated loops of bowel to indicate an obstructive process. The  appendix is normal. The prostate gland is enlarged presumably  representing benign prostatic hypertrophy. I cannot completely exclude  prostate carcinoma. The prostate gland measures 6.1 x 5.1 cm in  diameter. There is thickening of the wall the urinary bladder which can  be seen with a chronic bladder outlet obstruction. The seminal vesicles  are normal. There are atherosclerotic vascular calcifications throughout  both the abdomen and pelvis. There are no enlarged lymph nodes. There is  no free fluid within the abdomen or pelvis. There are no suspicious  osteolytic or sclerotic lesions within the bony structures.       Impression:          1. Stable bilateral pulmonary nodules. Given the long-term stability,  these are felt to be benign.  2. Stable chronic pleural/parenchymal scarring in the left lung as  described.  3. Emphysema.  4. Bilateral renal masses. A few of the masses are hyperdense and are  felt to be similar to the previous exam. These are difficult to  characterize without contrast, but probably represent a combination of  Bosniak type I and II renal cysts.  5. 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 I cannot completely exclude prostate carcinoma.  6. Thickening of the wall of the urinary bladder wall which is probably  due to chronic outlet obstruction.  7. Postsurgical changes involving the splenic flexure the colon. There  is fecal retention.     Electronically Signed By-Julio Schaeffer On:1/5/2020 1:07 PM  This report was finalized on [id number] by  Julio Schaeffer, .    MRI Brain With & Without Contrast [[id number]] Collected:  01/04/20 1136     Updated:  01/04/20 1143    Narrative:       MRI BRAIN W WO CONTRAST-     Date of Exam: 1/4/2020 10:14  AM     Indication: left sided paresthesias.     Technique: Technique: Routine multiplanar multisequence MR imaging of  the brain was performed without and with the administration of 20 ml of  ProHance  gadolinium contrast.        Comparison Exams: None available.     FINDINGS:  Diffusion-weighted images demonstrate no acute infarcts.  There is no  evidence of acute intracranial hemorrhage.  There is mild generalized  parenchymal volume loss.  Within the right thalamus there is a 2.0 x 1.7  cm mass with a moderate amount of surrounding vasogenic edema.  This  mass has decreased T1 and T2 signal.  Postcontrast images demonstrate  nodular peripheral contrast enhancement.  There are also multiple linear  areas of internal enhancement.  Differential considerations would  include glioma or metastatic disease.  Glioma is favored.  There is mild  mass effect on the third ventricle.  There is no definite hydro-Cephalus  at this time.  No other contrast enhancing lesions identified.  There  are no abnormal extra-axial fluid collections.  The major intracranial  vascular flow voids are preserved.  Within the subcortical white matter  of the right frontal lobe there is a focal area of increased T2 signal  most likely due to chronic ischemic change.  There is no associated  contrast enhancement.     Globes and orbits are within normal limits.  There is a polyp or mucous  retention cyst within the left maxillary sinus.  Other visualized  paranasal sinuses and mastoid air cells are clear.  Sella and  suprasellar cistern are within normal limits.  Craniovertebral junction  appears normal.     \             Impression:             1.  Contrast enhancing 2.0 x 1.7 cm mass within the right thalamus.   Differential considerations would include glioma or metastatic disease.   Glioma is favored.  There is mass effect on the third ventricle but  without definite hydrocephalus at this time.     Electronically Signed By-Andi Hill  On:1/4/2020 11:41 AM  This report was finalized on [id number] by  Andi Hill, .    XR Chest 1 View [[id number]] Collected:  01/04/20 1019     Updated:  01/04/20 1022    Narrative:       DATE OF EXAM:  1/4/2020 9:45 AM     PROCEDURE:  XR CHEST 1 VW-     INDICATIONS:  LEFT arm paresthesias      COMPARISON:  04/26/2019.     TECHNIQUE:   Single radiographic view of the chest was obtained.     FINDINGS:  The heart is borderline enlarged with postsurgical changes apparent. The  pulmonary vascular markings are normal. The lungs and pleural spaces are  clear of active disease.  There are chronic age-related changes  involving the bony thorax and thoracic aorta.       Impression:          1. Borderline cardiomegaly.  2. No active pulmonary disease.     Electronically Signed By-Julio Schaeffer On:1/4/2020 10:20 AM  This report was finalized on [id number] by  Julio Schaeffer, .            ASSESSMENT/PLAN:     Mass of brain    Brain mass as above, new finding  Left-sided paresthesias secondary to brain mass   History of colon cancer status post chemo and surgery 9 years ago  Hypertension  Acute kidney injury on chronic kidney disease  Hyperlipidemia  History of DVT/PE  Paroxysmal atrial fibrillation, chronic anticoagulation with Eliquis.  Currently sinus rhythm  Tobacco abuse in remission   PLAN    CT of the head reviewed as above  Neurosurgery consulted, plan to eval for any other primary noted; ct chest and abdomen reviewed, showed renal masses that are stable and felt to be benign , stable pulm nodules that are benign as well as prostate enlargement  Decadron and Keppra per neurosurgery recommendations. Change to PO  Sliding scale insulin for tight glycemic control due to steroid administration  Restart home dose atorvastatin  Restart home dose metoprolol  Holding diuretics at this time due to acute kidney injury on chronic kidney disease  Neurosurgery recommends to switch Eliquis to Lovenox till patient is evaluated as  outpatient. Have patient's cardiologist eval   PUD prophylaxis, Protonix  DVT prophylaxis with SCDs, hold chemical anticoagulation until surgical evaluation.  Restart home dose Eliquis when clinically appropriate  Nutrition: Advance per surgery recommendations  Plan for D/C home today with outpatient F/U with Neurosurgery

## 2020-01-06 NOTE — DISCHARGE SUMMARY
Pulmonary/ Critical Care/ sleep medicine discharge summary Note    Date of Discharge:  1/6/2020    Discharge Diagnosis: Brain mass    Presenting Problem/History of Present Illness  Active Hospital Problems    Diagnosis  POA   • Mass of brain [G93.89]  Yes      Resolved Hospital Problems   No resolved problems to display.          Hospital Course  74 y.o. male who presented to the emergency department for evaluation of a 1 month.  Of intermittent left sided paresthesias.  He reports that his left arm has had a burning sensation and his left leg feels cold although is not cold to the touch.  He denies generalized or unilateral weakness.  He denies recent fever, chills, nausea, vomiting, shortness of air, chest pain.  He reports occasional numbness and tingling of the fingers.  He reports that he has had an intermittent headache for 1 week with progressive worsening.  He does have a history of paroxysmal atrial fibrillation however is compliant with anticoagulation with Eliquis.     MRI of the brain obtained in the emergency department revealed a contrast-enhancing 2.0 x 1.7 cm mass within the right thalamus, differential considerations include glioma or metastatic disease.  There is mass-effect on the third ventricle but without definite hydrocephalus.  Neurosurgery was consulted by the emergency department physician and Keppra and Decadron have been initiated.  The patient will be admitted to the intensive care unit for close neuro monitoring and medical management.     Past medical history includes coronary artery disease, cardiomegaly, colon cancer status post chemo and surgery 9 years ago, DVT/PE, hypertension, chronic kidney disease, hyperlipidemia, obesity, paroxysmal atrial fibrillation with chronic anticoagulation with Eliquis, tobacco abuse in remission  Hospital course:  1/5: Denies any complaints, denies any headache or vomiting. Had ct chest and abdomen  1/6: Awake. No c/o SOA or CP, No N/V. No fever or  maral.     On 1/5/2020 the patient was evaluated by neurosurgery,  Dr. Gillette with the following report:  I am covering for Dr. Canela.  I was asked to see this patient by the emergency room physician.  He is a very pleasant man.  I saw him in the ICU with his wife.  He is a retired  and is done some farming.  He stopped smoking 30 years ago.  He did have a history of colon cancer treated in 2009 but he has been actually quite stable with it.  Over the last few months and particularly in the last few days he has been experiencing some burning sensation almost pain in the left side of his face, left arm, left leg and left side of his body.  He does have a history of coronary artery disease and he contacted his cardiologist who told him to go to the emergency room to be worked up for a stroke.  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  I instructed them to put him on Keppra and steroids and he was put in the ICU.  He does not really have any motor deficits and is completely awake and alert.  The mass is in the thalamus predominantly but it abuts the midbrain.  He has no extraocular movement disorder and no problems with swallowing.  He was in bed and I actually did not get him to walk but he has no drift or any obvious motor deficit.  I told him the nature of this problem that were dealing with either a metastatic tumor or a primary glioma.  We will get a CT scan of the chest abdomen pelvis.  If there is an area that is more easily accessible to biopsy in the chest abdomen pelvis then that would be the preferred area to get tissue to make a diagnosis but if there is none then he will need a brain biopsy.  Dr. Canela is back in town tomorrow and is aware of this patient.  We will get the process started.  He was on Eliquis and that is been stopped.      On  1/5/2020 the patient was evaluated by neurosurgery,  Dr. Canela with the following report:  I have seen and evaluated the patient.  He states that he has no double vision or weakness now.  This all improved after stopping the antibiotics and the chart and steroids.  He has no focal deficit that I can appreciate right now.  The lesion does appear to be more consistent with a probable primary neoplasm of the brain.  He does have a history of colon cancer in his chest abdomen pelvis imaging was negative.  I did tell him there is a remote chance this could be metastatic colon cancer.  Another potential pathology would be lymphoma though I think less likely.  Either way we will see if we can get him onto the operative schedule tomorrow.  If not we will need to try to plan to do him later this week or early next week.  If that is the case I do not feel that he needs to be in the hospital for this unless the primary team feels otherwise.  If he does go home and anticoagulation for his atrial fibrillation is valued appropriate then I would wish that he would go home on subcutaneous Lovenox and to stop 24 hours prior to the biopsy.  Today we reviewed the films and the procedure in detail.  I did discuss the risk of the procedure being bleeding, death, paralysis, infection, CSF leak, failure the procedure need for further procedures.  He indicates he understands and wishes to proceed.     Surgery was unable to be scheduled today and therefore the patient will be discharged home with the prescription for Lovenox and instructions to stop 24 hours prior to biopsy once it has been arranged.  You also continue to take Decadron twice daily.  He will be instructed to not drive until released by neurosurgery postoperatively.       Labs/radiological studies:  Lab Results (last 72 hours)     Procedure Component Value Units Date/Time    POC Glucose Once [[id number]]  (Abnormal) Collected:  01/06/20 1138    Specimen:  Blood Updated:   01/06/20 1157     Glucose 155 mg/dL      Comment: Serial Number: [id number]:  [id number]       Protime-INR [[id number]]  (Abnormal) Collected:  01/06/20 0801    Specimen:  Blood Updated:  01/06/20 0817     Protime 11.4 Seconds      INR 1.11    aPTT [[id number]]  (Abnormal) Collected:  01/06/20 0801    Specimen:  Blood Updated:  01/06/20 0817     PTT 22.1 seconds     POC Glucose Once [[id number]]  (Abnormal) Collected:  01/06/20 0710    Specimen:  Blood Updated:  01/06/20 0713     Glucose 141 mg/dL      Comment: Serial Number: [id number]:  [id number]       Basic Metabolic Panel [[id number]]  (Abnormal) Collected:  01/06/20 0403    Specimen:  Blood Updated:  01/06/20 0439     Glucose 158 mg/dL      BUN 36 mg/dL      Creatinine 1.50 mg/dL      Sodium 138 mmol/L      Potassium 3.9 mmol/L      Chloride 97 mmol/L      CO2 27.0 mmol/L      Calcium 9.7 mg/dL      eGFR Non African Amer 46 mL/min/1.73      BUN/Creatinine Ratio 24.0     Anion Gap 14.0 mmol/L     Narrative:       GFR Normal >60  Chronic Kidney Disease <60  Kidney Failure <15      Magnesium [[id number]]  (Normal) Collected:  01/06/20 0403    Specimen:  Blood Updated:  01/06/20 0439     Magnesium 2.4 mg/dL     CBC & Differential [[id number]] Collected:  01/06/20 0403    Specimen:  Blood Updated:  01/06/20 0411    Narrative:       The following orders were created for panel order CBC & Differential.  Procedure                               Abnormality         Status                     ---------                               -----------         ------                     CBC Auto Differential[[id number]]        Abnormal            Final result                 Please view results for these tests on the individual orders.    CBC Auto Differential [[id number]]  (Abnormal) Collected:  01/06/20 0403    Specimen:  Blood Updated:  01/06/20 0411     WBC 16.30 10*3/mm3      Comment: Result checked         RBC 5.27 10*6/mm3      Hemoglobin 15.1 g/dL      Hematocrit 44.0  %      MCV 83.5 fL      MCH 28.6 pg      MCHC 34.3 g/dL      RDW 17.1 %      RDW-SD 49.4 fl      MPV 7.9 fL      Platelets 271 10*3/mm3      Neutrophil % 89.4 %      Lymphocyte % 7.9 %      Monocyte % 2.3 %      Eosinophil % 0.0 %      Basophil % 0.4 %      Neutrophils, Absolute 14.60 10*3/mm3      Lymphocytes, Absolute 1.30 10*3/mm3      Monocytes, Absolute 0.40 10*3/mm3      Eosinophils, Absolute 0.00 10*3/mm3      Basophils, Absolute 0.10 10*3/mm3      nRBC 0.1 /100 WBC     POC Glucose Once [[id number]]  (Abnormal) Collected:  01/05/20 2135    Specimen:  Blood Updated:  01/05/20 2136     Glucose 193 mg/dL      Comment: Serial Number: [id number]:  [id number]       POC Glucose Once [[id number]]  (Abnormal) Collected:  01/05/20 1924    Specimen:  Blood Updated:  01/05/20 1938     Glucose 201 mg/dL      Comment: Serial Number: [id number]:  [id number]       POC Glucose Once [[id number]]  (Abnormal) Collected:  01/05/20 1732    Specimen:  Blood Updated:  01/05/20 1742     Glucose 164 mg/dL      Comment: Serial Number: [id number]:  [id number]       POC Glucose Once [[id number]]  (Abnormal) Collected:  01/05/20 1200    Specimen:  Blood Updated:  01/05/20 1206     Glucose 210 mg/dL      Comment: Serial Number: [id number]:  [id number]       MRSA Screen, PCR - Swab, Nares [[id number]]  (Normal) Collected:  01/04/20 2236    Specimen:  Swab from Nares Updated:  01/05/20 0836     MRSA PCR No MRSA Detected    Basic Metabolic Panel [[id number]]  (Abnormal) Collected:  01/05/20 0507    Specimen:  Blood Updated:  01/05/20 0722     Glucose 226 mg/dL      BUN 32 mg/dL      Creatinine 1.59 mg/dL      Sodium 136 mmol/L      Potassium 3.5 mmol/L      Chloride 96 mmol/L      CO2 24.0 mmol/L      Calcium 9.9 mg/dL      eGFR Non African Amer 43 mL/min/1.73      BUN/Creatinine Ratio 20.1     Anion Gap 16.0 mmol/L     Narrative:       GFR Normal >60  Chronic Kidney Disease <60  Kidney Failure <15      Magnesium [[id number]]   (Normal) Collected:  01/05/20 0507    Specimen:  Blood Updated:  01/05/20 0657     Magnesium 2.3 mg/dL     Lipid Panel [[id number]]  (Abnormal) Collected:  01/05/20 0507    Specimen:  Blood Updated:  01/05/20 0657     Total Cholesterol 124 mg/dL      Triglycerides 55 mg/dL      HDL Cholesterol 38 mg/dL      LDL Cholesterol  75 mg/dL      VLDL Cholesterol 11 mg/dL      LDL/HDL Ratio 1.97    Narrative:       Cholesterol Reference Ranges  (U.S. Department of Health and Human Services ATP III Classifications)    Desirable          <200 mg/dL  Borderline High    200-239 mg/dL  High Risk          >240 mg/dL      Triglyceride Reference Ranges  (U.S. Department of Health and Human Services ATP III Classifications)    Normal           <150 mg/dL  Borderline High  150-199 mg/dL  High             200-499 mg/dL  Very High        >500 mg/dL    HDL Reference Ranges  (U.S. Department of Health and Human Services ATP III Classifcations)    Low     <40 mg/dl (major risk factor for CHD)  High    >60 mg/dl ('negative' risk factor for CHD)        LDL Reference Ranges  (U.S. Department of Health and Human Services ATP III Classifcations)    Optimal          <100 mg/dL  Near Optimal     100-129 mg/dL  Borderline High  130-159 mg/dL  High             160-189 mg/dL  Very High        >189 mg/dL    CBC & Differential [[id number]] Collected:  01/05/20 0507    Specimen:  Blood Updated:  01/05/20 0552    Narrative:       The following orders were created for panel order CBC & Differential.  Procedure                               Abnormality         Status                     ---------                               -----------         ------                     CBC Auto Differential[[id number]]        Abnormal            Final result                 Please view results for these tests on the individual orders.    CBC Auto Differential [[id number]]  (Abnormal) Collected:  01/05/20 0507    Specimen:  Blood Updated:  01/05/20 0552     WBC 10.20  10*3/mm3      RBC 5.21 10*6/mm3      Hemoglobin 14.9 g/dL      Hematocrit 43.7 %      MCV 83.8 fL      MCH 28.6 pg      MCHC 34.1 g/dL      RDW 16.9 %      RDW-SD 49.4 fl      MPV 8.8 fL      Platelets 240 10*3/mm3      Neutrophil % 89.6 %      Lymphocyte % 7.4 %      Monocyte % 2.7 %      Eosinophil % 0.0 %      Basophil % 0.3 %      Neutrophils, Absolute 9.20 10*3/mm3      Lymphocytes, Absolute 0.80 10*3/mm3      Monocytes, Absolute 0.30 10*3/mm3      Eosinophils, Absolute 0.00 10*3/mm3      Basophils, Absolute 0.00 10*3/mm3      nRBC 0.1 /100 WBC     POC Glucose Once [[id number]]  (Abnormal) Collected:  01/04/20 1955    Specimen:  Blood Updated:  01/04/20 1956     Glucose 247 mg/dL      Comment: Serial Number: [id number]:  [id number]       Protime-INR [[id number]]  (Abnormal) Collected:  01/04/20 0952    Specimen:  Blood Updated:  01/04/20 1029     Protime 12.0 Seconds      INR 1.18    aPTT [[id number]]  (Normal) Collected:  01/04/20 0952    Specimen:  Blood Updated:  01/04/20 1029     PTT 26.3 seconds     Basic Metabolic Panel [[id number]]  (Abnormal) Collected:  01/04/20 0952    Specimen:  Blood Updated:  01/04/20 1020     Glucose 126 mg/dL      BUN 26 mg/dL      Creatinine 1.75 mg/dL      Sodium 139 mmol/L      Potassium 3.3 mmol/L      Chloride 97 mmol/L      CO2 26.0 mmol/L      Calcium 9.9 mg/dL      eGFR Non African Amer 39 mL/min/1.73      BUN/Creatinine Ratio 14.9     Anion Gap 16.0 mmol/L     Narrative:       GFR Normal >60  Chronic Kidney Disease <60  Kidney Failure <15      Troponin [[id number]]  (Normal) Collected:  01/04/20 0952    Specimen:  Blood Updated:  01/04/20 1020     Troponin T 0.012 ng/mL     Narrative:       Troponin T Reference Range:  <= 0.03 ng/mL-   Negative for AMI  >0.03 ng/mL-     Abnormal for myocardial necrosis.  Clinicians would have to utilize clinical acumen, EKG, Troponin and serial changes to determine if it is an Acute Myocardial Infarction or myocardial injury due to an  underlying chronic condition.     CBC & Differential [[id number]] Collected:  01/04/20 0952    Specimen:  Blood Updated:  01/04/20 1001    Narrative:       The following orders were created for panel order CBC & Differential.  Procedure                               Abnormality         Status                     ---------                               -----------         ------                     CBC Auto Differential[[id number]]        Abnormal            Final result                 Please view results for these tests on the individual orders.    CBC Auto Differential [[id number]]  (Abnormal) Collected:  01/04/20 0952    Specimen:  Blood Updated:  01/04/20 1001     WBC 7.80 10*3/mm3      RBC 5.25 10*6/mm3      Hemoglobin 14.7 g/dL      Hematocrit 43.4 %      MCV 82.6 fL      MCH 28.0 pg      MCHC 33.9 g/dL      RDW 17.0 %      RDW-SD 49.9 fl      MPV 8.2 fL      Platelets 233 10*3/mm3      Neutrophil % 70.5 %      Lymphocyte % 15.9 %      Monocyte % 12.7 %      Eosinophil % 0.4 %      Basophil % 0.5 %      Neutrophils, Absolute 5.50 10*3/mm3      Lymphocytes, Absolute 1.20 10*3/mm3      Monocytes, Absolute 1.00 10*3/mm3      Eosinophils, Absolute 0.00 10*3/mm3      Basophils, Absolute 0.00 10*3/mm3      nRBC 0.2 /100 WBC         Ct Abdomen Pelvis Without Contrast    Result Date: 1/5/2020   1. Stable bilateral pulmonary nodules. Given the long-term stability, these are felt to be benign. 2. Stable chronic pleural/parenchymal scarring in the left lung as described. 3. Emphysema. 4. Bilateral renal masses. A few of the masses are hyperdense and are felt to be similar to the previous exam. These are difficult to characterize without contrast, but probably represent a combination of Bosniak type I and II renal cysts. 5. 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 I cannot completely exclude prostate carcinoma. 6. Thickening of the wall of the urinary bladder wall which is probably due to  chronic outlet obstruction. 7. Postsurgical changes involving the splenic flexure the colon. There is fecal retention.  Electronically Signed By-Julio Schaeffer On:1/5/2020 1:07 PM This report was finalized on [id number] by  Julio Schaeffer, .    Ct Chest Without Contrast    Result Date: 1/5/2020   1. Stable bilateral pulmonary nodules. Given the long-term stability, these are felt to be benign. 2. Stable chronic pleural/parenchymal scarring in the left lung as described. 3. Emphysema. 4. Bilateral renal masses. A few of the masses are hyperdense and are felt to be similar to the previous exam. These are difficult to characterize without contrast, but probably represent a combination of Bosniak type I and II renal cysts. 5. 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 I cannot completely exclude prostate carcinoma. 6. Thickening of the wall of the urinary bladder wall which is probably due to chronic outlet obstruction. 7. Postsurgical changes involving the splenic flexure the colon. There is fecal retention.  Electronically Signed ByOscar Schaeffer On:1/5/2020 1:07 PM This report was finalized on [id number] by  Julio Schaeffer, .    Mri Brain With & Without Contrast    Result Date: 1/4/2020    1.  Contrast enhancing 2.0 x 1.7 cm mass within the right thalamus. Differential considerations would include glioma or metastatic disease. Glioma is favored.  There is mass effect on the third ventricle but without definite hydrocephalus at this time.  Electronically Signed By-Andi Hill On:1/4/2020 11:41 AM This report was finalized on [id number] by  Andi Hill, .    Xr Chest 1 View    Result Date: 1/4/2020   1. Borderline cardiomegaly. 2. No active pulmonary disease.  Electronically Signed By-Julio Schaeffer On:1/4/2020 10:20 AM This report was finalized on [id number] by  Julio Schaeffer, .      Consults:   Consults     Date and Time Order Name Status Description    1/4/2020 1207 Pulmonology (on-call  MD unless specified) Completed     1/4/2020 1146 Neurosurgery (on-call MD unless specified) Completed             Condition on Discharge: Stable    Vital Signs  Temp:  [97.5 °F (36.4 °C)-98.1 °F (36.7 °C)] 98 °F (36.7 °C)  Heart Rate:  [62-88] 82  BP: (103-135)/(45-83) 135/57    Physical Exam:  Constitutional:  Well developed, well nourished, no acute distress, non-toxic appearance   Eyes:  PERRL, conjunctiva normal   HENT:  Atraumatic, external ears normal, nose normal, oropharynx moist. Neck- normal range of motion, no tenderness, supple   Respiratory:  No respiratory distress, normal breath sounds, no rales, no wheezing   Cardiovascular:  Normal rate, normal rhythm, no murmurs, no gallops, no rubs   GI:  Soft, nondistended, normal bowel sounds, nontender, no rebound, no guarding   : Deferred  Musculoskeletal:  No edema, no tenderness, no deformities  Integument:  Well hydrated, no rash   Neurologic:  Alert & oriented x 3, no lateralizing deficits.  Reports continued headache without vision changes.  Reports continued sensation of coolness to the right lower extremity and warmth to the right upper extremity  Psychiatric:  Speech and behavior appropriate      Discharge Disposition  Home or Self Care    Discharge Medications     Discharge Medications      New Medications      Instructions Start Date   dexamethasone 2 MG tablet  Commonly known as:  DECADRON   2 mg, Oral, Every 12 Hours Scheduled      enoxaparin 100 MG/ML solution syringe  Commonly known as:  LOVENOX   1 mg/kg (120 mg), Subcutaneous, Every 12 Hours Scheduled         Continue These Medications      Instructions Start Date   allopurinol 100 MG tablet  Commonly known as:  ZYLOPRIM   200 mg, Oral, Daily      atorvastatin 20 MG tablet  Commonly known as:  LIPITOR   20 mg, Oral, Nightly      fluticasone 50 MCG/ACT nasal spray  Commonly known as:  FLONASE   1 spray, Nasal, Daily PRN      LASIX 40 MG tablet  Generic drug:  furosemide   40 mg, Oral, Daily       metoprolol tartrate 25 MG tablet  Commonly known as:  LOPRESSOR   25 mg, Oral, 2 Times Daily      omeprazole 40 MG capsule  Commonly known as:  priLOSEC   40 mg, Oral, Daily PRN      potassium chloride 10 MEQ CR tablet  Commonly known as:  K-DUR,KLOR-CON   10 mEq, Oral, Daily      triamterene-hydrochlorothiazide 75-50 MG per tablet  Commonly known as:  MAXZIDE   1 tablet, Oral, Daily      vitamin B-12 1000 MCG tablet  Commonly known as:  CYANOCOBALAMIN   1,000 mcg, Oral, Daily      Vitamin D3 50 MCG (2000 UT) capsule   2,000 Units, Oral, Daily         Stop These Medications    apixaban 5 MG tablet tablet  Commonly known as:  ELIQUIS     aspirin 81 MG chewable tablet     levoFLOXacin 750 MG tablet  Commonly known as:  LEVAQUIN            Discharge Diet: Healthy heart    Activity at Discharge: As tolerated.  No driving    Follow-up Appointments  Future Appointments   Date Time Provider Department Center   3/11/2020  9:20 AM Richi Og MD [facility]   12/3/2020  9:00 AM Allyson Gautam MD [facility] None              Time:

## 2020-01-07 NOTE — OUTREACH NOTE
Prep Survey      Responses   Facility patient discharged from?  [facility]   Is patient eligible?  Yes   Discharge diagnosis  Mass of brain    Does the patient have one of the following disease processes/diagnoses(primary or secondary)?  Other   Does the patient have Home health ordered?  No   Is there a DME ordered?  No   Prep survey completed?  Yes          Kenyatta Recinos RN

## 2020-01-07 NOTE — PROGRESS NOTES
Continued Stay Note  [facility]     Patient Name: Portillo Pete  MRN: [id number]  Today's Date: 1/7/2020    Admit Date: 1/4/2020    Discharge Plan     Row Name 01/07/20 0827       Plan    Plan Comments  cost of lovenox is 200.00  . pt ok with cost .     Row Name 01/07/20 0823       Plan    Final Discharge Disposition Code  01 - home or self-care    Final Note  planned readmit and the end of the week for a brain bx         Discharge Codes    No documentation.       Expected Discharge Date and Time     Expected Discharge Date Expected Discharge Time    Jan 6, 2020             Heidi Schmidt RN

## 2020-01-07 NOTE — PROGRESS NOTES
Case Management Discharge Note      Final Note: planned readmit and the end of the week for a brain bx                       Final Discharge Disposition Code: 01 - home or self-care

## 2020-01-08 PROBLEM — M19.90 OSTEOARTHRITIS: Status: ACTIVE | Noted: 2018-01-30

## 2020-01-08 PROBLEM — I10 HYPERTENSION: Status: ACTIVE | Noted: 2020-01-01

## 2020-01-08 PROBLEM — E78.5 HYPERLIPIDEMIA: Status: ACTIVE | Noted: 2019-01-01

## 2020-01-08 PROBLEM — M81.0 OSTEOPOROSIS: Status: ACTIVE | Noted: 2020-01-01

## 2020-01-08 NOTE — PROGRESS NOTES
"Subjective   Portillo Pete is a 74 y.o. male.     Chief Complaint   Patient presents with   • Brain Tumor     Visit Vitals  /84   Pulse (!) 132   Ht 185.4 cm (73\")   Wt 121 kg (266 lb)   BMI 35.09 kg/m²       History of Present Illness: Mr. Pete is here today with his family to discuss proceed ahead with a stereotactic biopsy of the right thalamic lesion.  He is currently taking his Lovenox and has been advised to stop his last dose tonight and to be off Lovenox prior to surgical intervention.  We are planned to undergo a right thalamic biopsy this Friday.  He states that he is doing relatively okay and he has no new complaints.    The following portions of the patient's history were reviewed and updated as appropriate: allergies, current medications, past family history, past medical history, past social history, past surgical history and problem list.    Review of Systems      Past Surgical History:   Procedure Laterality Date   • CARDIAC CATHETERIZATION Left 11/17/16-[facility]    w/Arteriography/Angiography--Dr. Og--Nonobstructive CAD Involving the LAD & LCX   • CARDIAC CATHETERIZATION Left 4/15/19-[facility]    w/Arteriography/Angiography--Dr. Og--Severe 3V CAD   • COLON RESECTION  2000    Colon Ca   • COLON SURGERY  06/25/2011    Lap L Hemicolectomy w/Transverse Sigmoid Side to Side Anastomaosis   • COLONOSCOPY W/ POLYPECTOMY  06/24/2011 & 05/2012   • CORONARY ARTERY BYPASS GRAFT  04/24/2019    X4  Dr Bermudez   • MAZE PROCEDURE  04/23/2019    Dr Bermudez   • OTHER SURGICAL HISTORY  6/30/11-[facility]    L Subclavian Mediport Placement w/Fluoroscopy Interpretation--Akila Cartagena MD--For Stage 2 Colon Cancer   • OTHER SURGICAL HISTORY  8/9/12-[facility]    Removal of L Subclavian Mediport--Akila aCrtagena MD--For Stage 1 Colon Cancer/Chemo       Past Medical History:   Diagnosis Date   • 3-vessel CAD 04/15/2019    Severe Involving Proximal LAD/LCX--Noted on Cardiac Cath    • Allergic rhinitis 04/2018   • Angina pectoris " (CMS/HCC) Chronic   • Bilateral renal cysts 04/15/2019    Noted on Renal US   • Bronchitis 01/2017   • CAD (coronary artery disease) 11/17/2016    Involving the LAD/LCX--Noted on Cardiac Cath   • Cardiomegaly 11/16/16 & 02/06/2017 & 4/19/19-XR    Noted on Chest XR & CT Chest   • Chronic fatigue    • Colon cancer (CMS/[facility]) Dx in 2000    Stage 2 w/Chemo--S/p Sx on 06/30/11    • Coronary artery calcification    • Cyst of left kidney 04/15/2019    1.7CM--Noted on Renal US   • DVT (deep venous thrombosis) (CMS/[facility]) Hx   • Dyspnea 04/2019   • Elevated serum creatinine 4/15/19-F IP   • Enlarged prostate 04/15/2019    Noted on Renal US   • First degree AV block 08/09/2012    Noted on EKG   • GERD (gastroesophageal reflux disease)     Prilosec   • History of cancer chemotherapy 2011    Colon Ca   • History of chest x-ray 4/19/19-[facility]    L Midlung Granulomas; Stable Cardiomegaly   • History of EKG 08/9/12-[facility]    First Degree AV Block; Premature Atrial Contractions   • History of EKG 4/2019-[facility]   • History of EKG 6/25/11-[facility]    L Axis Deviations; Probable Lateral Infarct; Borderline AV Conduction Delay   • History of nuclear stress test 11/16/16-[facility]    Inferoapocal Wall Ischemia Noted   • Hx of chest x-ray 11/16/16-[facility]    Normal w/Stable Cardiomegaly   • Hx of colonic polyps 06/24/2011    Noted on Colonoscopy   • Hyperbilirubinemia Hx   • Hypertension     Controlled w/Meds   • Kidney disease, chronic, stage III (GFR 30-59 ml/min) (CMS/[facility])    • LAD stenosis 11/17/2016    40-50% LAD Disease-First Diagonal is Small w/60-70% Disease--Noted on Cardiac Cath    • LAD stenosis 04/15/2019    70% Proximal LAD Stenosis; 90% Diagonal; 80-90% First Marginal Stenosis; 2nd Marginal is Large with 60-70% Disease--Noted on Cardiac Cath    • Left axis deviation 06/25/2011    Noted on EKG   • Lower extremity edema 2017   • 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    Noted on Chest XR   • Megaloblastic anemia due to B12 deficiency    • Mixed  hyperlipidemia    • Obesity    • Osteoarthritis     Knee   • Paroxysmal A-fib (CMS/HCC) Dx 11/2016    Eliquis   • PE (pulmonary thromboembolism) (CMS/HCC) Hx   • Pulmonary nodule, right 02/06/2017    8MM--Noted on CT Chest   • Pulmonary nodules 01/27/17 & 02/06/2017    Several--Noted on CT Chest Measuring 2-3MM   • RCA occlusion (CMS/HCC) 11/17/2016    50% Proximal Disease--Noted on Cardiac Cath    • RCA occlusion (CMS/HCC) 04/15/2019    70% Ostial Stenosis; Ostial the PDA to Proximal PDA Has 90% Disease--Noted on Cardiac Cath   • Renal cyst 04/15/2019    Noted on Renal US--R Measuring 6.8CM   • SOB (shortness of breath) 2016   • Vitamin B 12 deficiency    • Vitamin D deficiency        Objective   Physical Exam  Neurologic Exam  Ortho Exam        Assessment and Plan: I spent 15 minutes with the family today discussing the films and reviewing the surgical procedure in detail.  We did discuss the risk of the procedure today being bleeding, death, paralysis, infection, CSF leak, failure of the procedure with need for further procedures.  They indicate they understand and wish to proceed.  I feel most likely the lesion is primary nature but the very well could be that this is a metastatic lesion.  He does have a history of adenocarcinoma of the colon.      Problems Addressed this Visit     None

## 2020-01-08 NOTE — OUTREACH NOTE
Medical Week 1 Survey      Responses   Facility patient discharged from?  [facility]   Does the patient have one of the following disease processes/diagnoses(primary or secondary)?  Other   Is there a successful TCM telephone encounter documented?  No   Week 1 attempt successful?  Yes   Call start time  1653   Call end time  1654   Discharge diagnosis  Mass of brain    Meds reviewed with patient/caregiver?  Yes   Is the patient having any side effects they believe may be caused by any medication additions or changes?  No   Does the patient have all medications ordered at discharge?  Yes   Is the patient taking all medications as directed (includes completed medication regime)?  Yes   Does the patient have a primary care provider?   Yes   Does the patient have an appointment with their PCP within 7 days of discharge?  Yes   Has the patient kept scheduled appointments due by today?  Yes   Comments  Patient stated he had an appt with Dr. Canela today.   Has home health visited the patient within 72 hours of discharge?  N/A   Did the patient receive a copy of their discharge instructions?  Yes   Nursing interventions  Reviewed instructions with patient   What is the patient's perception of their health status since discharge?  Improving   Is the patient/caregiver able to teach back the hierarchy of who to call/visit for symptoms/problems? PCP, Specialist, Home health nurse, Urgent Care, ED, 911  Yes   Additional teach back comments  Patient states he is having a biopsy done Friday and will be here.  States several people have contacted him regarding questions and needs.  States he has everything he needs at the moment.   Week 1 call completed?  Yes   Graduated  Yes   Did the patient feel the follow up calls were helpful during their recovery period?  Yes   Was the number of calls appropriate?  Yes   Graduated/Revoked comments  No questions or needs at this time.          Cortney Brown LPN

## 2020-01-09 NOTE — TELEPHONE ENCOUNTER
SPOKE TO AYE AND NOTIFIED HER TO MAKE SURE LEATHA DOES NOT TAKE ANYMORE LOVENOX PRIOR TO SURGERY ON 1/10/2020. AYE VERBALLY AGREED

## 2020-01-10 NOTE — ANESTHESIA PROCEDURE NOTES
Airway  Urgency: elective    Date/Time: 1/10/2020 2:02 PM  Airway not difficult    General Information and Staff    Patient location during procedure: OR    Indications and Patient Condition  Indications for airway management: airway protection    Preoxygenated: yes  MILS maintained throughout  Mask difficulty assessment: 3 - difficult mask (inadequate, unstable or two providers) +/- NMBA    Final Airway Details  Final airway type: endotracheal airway      Successful airway: ETT  Cuffed: yes   Successful intubation technique: direct laryngoscopy  Endotracheal tube insertion site: oral  Blade: Osmin  Blade size: 3  ETT size (mm): 7.0  Cormack-Lehane Classification: grade III - view of epiglottis only  Placement verified by: capnometry   Measured from: lips  ETT/EBT  to lips (cm): 23  Number of attempts at approach: 1  Assessment: lips, teeth, and gum same as pre-op and atraumatic intubation

## 2020-01-10 NOTE — ANESTHESIA POSTPROCEDURE EVALUATION
Patient: Portillo Pete    Procedure Summary     Date:  01/10/20 Room / Location:  [facility] OR  / [facility] MAIN OR    Anesthesia Start:  1354 Anesthesia Stop:  1542    Procedure:  Right frontal stereotactic biopsy (Right Head) Diagnosis:       Mass of brain      (Mass of brain [G93.89])    Surgeon:  Jonathan Canela MD Provider:  Steven Ross MD    Anesthesia Type:  general ASA Status:  3          Anesthesia Type: general    Vitals  Vitals Value Taken Time   /55 1/10/2020  4:27 PM   Temp 97.3 °F (36.3 °C) 1/10/2020  3:42 PM   Pulse 88 1/10/2020  4:28 PM   Resp 20 1/10/2020  4:27 PM   SpO2 100 % 1/10/2020  4:28 PM   Vitals shown include unvalidated device data.        Post Anesthesia Care and Evaluation    Patient location during evaluation: PACU  Patient participation: complete - patient participated  Level of consciousness: awake  Pain scale: See nurse's notes for pain score.  Pain management: adequate  Airway patency: patent  Anesthetic complications: No anesthetic complications  PONV Status: none  Cardiovascular status: acceptable  Respiratory status: acceptable  Hydration status: acceptable    Comments: Patient seen and examined postoperatively; vital signs stable; SpO2 greater than or equal to 90%; cardiopulmonary status stable; nausea/vomiting adequately controlled; pain adequately controlled; no apparent anesthesia complications; patient discharged from anesthesia care when discharge criteria were met

## 2020-01-10 NOTE — ANESTHESIA PREPROCEDURE EVALUATION
Anesthesia Evaluation     Patient summary reviewed   NPO Solid Status: > 8 hours  NPO Liquid Status: > 8 hours           Airway   Mallampati: I  TM distance: >3 FB  Neck ROM: full  No difficulty expected  Dental - normal exam   (+) edentulous    Pulmonary - normal exam   (+) pulmonary embolism, shortness of breath,   Cardiovascular - normal exam  Exercise tolerance: poor (<4 METS)    (+) hypertension, CAD, CABG, dysrhythmias Atrial Fib, angina, DVT resolved, hyperlipidemia,       Neuro/Psych  GI/Hepatic/Renal/Endo    (+) obesity,       Musculoskeletal     Abdominal  - normal exam    Bowel sounds: normal.   Substance History      OB/GYN          Other                        Anesthesia Plan    ASA 3     general   (A-line discussed, ICU observation)  intravenous induction     Anesthetic plan, all risks, benefits, and alternatives have been provided, discussed and informed consent has been obtained with: patient.  Use of blood products discussed with consented to blood products.

## 2020-01-10 NOTE — ANESTHESIA PROCEDURE NOTES
Arterial Line      Patient location during procedure: OR  Start time: 1/10/2020 2:04 PM  Stop Time:1/10/2020 2:08 PM       Performed By   Anesthesiologist: Steven Ross MD  CRNA: Ruben Long AA  Preanesthetic Checklist  Completed: patient identified, site marked, surgical consent, pre-op evaluation, timeout performed, IV checked, risks and benefits discussed and monitors and equipment checked  Arterial Line Prep   Sterile Tech: cap, gloves and mask  Prep: ChloraPrep  Patient monitoring: blood pressure monitoring, continuous pulse oximetry and EKG  Arterial Line Procedure   Laterality:left  Location:  radial artery  Catheter size: 18 G   Guidance: landmark technique and palpation technique  Number of attempts: 1  Successful placement: yes  Heparin (Porcine) in NaCl 1000-0.9 UT/500ML-% for arterial line pressure bag, 1,000 Units  Post Assessment   Dressing Type: wrist guard applied and secured with tape.   Complications no  Circ/Move/Sens Assessment: normal.   Patient Tolerance: patient tolerated the procedure well with no apparent complications

## 2020-01-11 PROBLEM — R21 RASH: Status: ACTIVE | Noted: 2020-01-01

## 2020-01-11 PROBLEM — D72.819 LEUKOCYTOPENIA: Status: ACTIVE | Noted: 2020-01-01

## 2020-01-11 PROBLEM — N28.9 RENAL INSUFFICIENCY: Status: ACTIVE | Noted: 2020-01-01

## 2020-01-12 PROBLEM — I45.5 SINUS PAUSE: Status: ACTIVE | Noted: 2020-01-01

## 2020-01-12 PROBLEM — R41.0 DELIRIUM: Status: ACTIVE | Noted: 2020-01-01

## 2020-01-12 PROBLEM — R11.0 NAUSEA: Status: ACTIVE | Noted: 2020-01-01

## 2020-01-14 PROBLEM — C71.9 GLIOBLASTOMA MULTIFORME (HCC): Status: ACTIVE | Noted: 2020-01-01

## 2020-01-16 NOTE — OUTREACH NOTE
Prep Survey      Responses   Facility patient discharged from?  [facility]   Is patient eligible?  Yes   Discharge diagnosis  Glioblastoma of brain, s/p right frontal sterotactic biopsy, sinus pause, nausea, delirium, renal insuff., HTN, HLD, afib. GERD, hx malignant neoplasm of large intestine   Does the patient have one of the following disease processes/diagnoses(primary or secondary)?  General Surgery   Does the patient have Home health ordered?  Yes   What is the Home health agency?   [facility]   Is there a DME ordered?  Yes   What DME was ordered?  Kiki amhmood    Comments regarding appointments  Pt to schedule follow up appointments   Prep survey completed?  Yes          Arlette Mckeon RN

## 2020-01-20 PROBLEM — C71.9 GLIOBLASTOMA DETERMINED BY BIOPSY OF BRAIN (HCC): Status: ACTIVE | Noted: 2020-01-01

## 2020-01-20 NOTE — PROGRESS NOTES
Hematology/Oncology Outpatient Follow Up    Portillo Pete  1945    Primary Care Physician: Quyen Headley  Referring Physician: No ref. provider found  Chief Complaint:   Stage III adenocarcinoma of the colon in June 2011  Glioblastoma multiform diagnosed in January 2020  History of Present Illness:     · Mr. Pete was admitted to [facility] in January 2020 with the left side paresthesias and burning sensations.  An MRI of the brain was obtained.  · 1/4/2020 MRI of the brain with and without contrast revealed- 1.  Contrast enhancing 2.0 x 1.7 cm mass within the right thalamus. Differential considerations would include glioma or metastatic disease. Glioma is favored. There is mass effect on the third ventricle but without definite hydrocephalus at this time.  · 1/5/2020 CT of the chest abdomen and pelvis were obtained without contrast- . Stable bilateral pulmonary nodules. Given the long-term stability, these are felt to be benign. 2. Stable chronic pleural/parenchymal scarring in the left lung as described. 3. Emphysema. 4. Bilateral renal masses. A few of the masses are hyperdense and are felt to be similar to the previous exam. These are difficult to characterize without contrast, but probably represent a combination of Bosniak type I and II renal cysts. 5. 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 I cannot completely exclude prostate carcinoma. 6. Thickening of the wall of the urinary bladder wall which is probably due to chronic outlet obstruction. 7. Postsurgical changes involving the splenic flexure the colon. There is fecal retention.  · 1/10/2020 biopsy of the right thalamic lesion was obtained which is positive for high-grade glial neoplasm consistent with glioblastoma multiforme( WHO grade 4)  · Patient was seen by radiation oncology during the hospital stay patient was discharged home on Decadron.  ·     Past Medical History:   Diagnosis Date   •  3-vessel CAD 04/15/2019    Severe Involving Proximal LAD/LCX--Noted on Cardiac Cath    • Allergic rhinitis 04/2018   • Angina pectoris (CMS/HCC) Chronic   • Bilateral renal cysts 04/15/2019    Noted on Renal US   • Bronchitis 01/2017   • CAD (coronary artery disease) 11/17/2016    Involving the LAD/LCX--Noted on Cardiac Cath   • Cardiomegaly 11/16/16 & 02/06/2017 & 4/19/19-XR    Noted on Chest XR & CT Chest   • Chronic fatigue    • Colon cancer (CMS/HCC) Dx in 2000    Stage 2 w/Chemo--S/p Sx on 06/30/11    • Coronary artery calcification    • Cyst of left kidney 04/15/2019    1.7CM--Noted on Renal US   • DVT (deep venous thrombosis) (CMS/HCC) Hx   • Dyspnea 04/2019   • Elevated serum creatinine 4/15/19-[facility] IP   • Enlarged prostate 04/15/2019    Noted on Renal US   • First degree AV block 08/09/2012    Noted on EKG   • GERD (gastroesophageal reflux disease)     Prilosec   • Glioblastoma multiforme (CMS/HCC) 1/4/2020   • History of cancer chemotherapy 2011    Colon Ca   • History of chest x-ray 4/19/19-[facility]    L Midlung Granulomas; Stable Cardiomegaly   • History of EKG 08/9/12-[facility]    First Degree AV Block; Premature Atrial Contractions   • History of EKG 4/2019-[facility]   • History of EKG 6/25/11-[facility]    L Axis Deviations; Probable Lateral Infarct; Borderline AV Conduction Delay   • History of nuclear stress test 11/16/16-[facility]    Inferoapocal Wall Ischemia Noted   • Hx of chest x-ray 11/16/16-[facility]    Normal w/Stable Cardiomegaly   • Hx of colonic polyps 06/24/2011    Noted on Colonoscopy   • Hyperbilirubinemia Hx   • Hypertension     Controlled w/Meds   • Kidney disease, chronic, stage III (GFR 30-59 ml/min) (CMS/HCC)    • LAD stenosis 11/17/2016    40-50% LAD Disease-First Diagonal is Small w/60-70% Disease--Noted on Cardiac Cath    • LAD stenosis 04/15/2019    70% Proximal LAD Stenosis; 90% Diagonal; 80-90% First Marginal Stenosis; 2nd Marginal is Large with 60-70% Disease--Noted on Cardiac Cath    • Left axis deviation  06/25/2011    Noted on EKG   • Lower extremity edema 2017   • 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    Noted on Chest XR   • Megaloblastic anemia due to B12 deficiency    • Mixed hyperlipidemia    • Obesity    • Osteoarthritis     Knee   • Paroxysmal A-fib (CMS/HCC) Dx 11/2016    Eliquis   • PE (pulmonary thromboembolism) (CMS/HCC) Hx   • Pulmonary nodule, right 02/06/2017    8MM--Noted on CT Chest   • Pulmonary nodules 01/27/17 & 02/06/2017    Several--Noted on CT Chest Measuring 2-3MM   • RCA occlusion (CMS/HCC) 11/17/2016    50% Proximal Disease--Noted on Cardiac Cath    • RCA occlusion (CMS/HCC) 04/15/2019    70% Ostial Stenosis; Ostial the PDA to Proximal PDA Has 90% Disease--Noted on Cardiac Cath   • Renal cyst 04/15/2019    Noted on Renal US--R Measuring 6.8CM   • SOB (shortness of breath) 2016   • Vitamin B 12 deficiency    • Vitamin D deficiency        Past Surgical History:   Procedure Laterality Date   • [facility]    w/[facility]/Angiography--Dr. Og--Nonobstructive CAD Involving the LAD & LCX   • CARDIAC [facility]    w/Arteriography/Angiography--Dr. Og--Severe 3V CAD   • COLON RESECTION  2000    Colon Ca   • COLON SURGERY  06/25/2011    Lap L Hemicolectomy w/Transverse Sigmoid Side to Side Anastomaosis   • COLONOSCOPY W/ POLYPECTOMY  06/24/2011 & 05/2012   • CORONARY ARTERY BYPASS GRAFT  04/24/2019    X4  Dr Bermudez   • CRANIOTOMY Right 1/10/2020    Procedure: Right frontal stereotactic biopsy;  Surgeon: Jonathan Canela MD;  Location: [facility] OR;  Service: Neurosurgery   • MAZE PROCEDURE  04/23/2019    Dr Bermudez   • OTHER SURGICAL HISTORY  6/30/11-[facility]    L Subclavian Mediport Placement w/Fluoroscopy Interpretation--Akila Cartagena MD--For Stage 2 Colon Cancer   • OTHER SURGICAL HISTORY  8/9/12-[facility]    Removal of L Subclavian Mediport--Akila Cartagena MD--For Stage 1 Colon Cancer/Chemo   · Mr. Pete had a screening colonoscopy, in  June 2011 which showed a polyp in the splenic  flexure, 2.5cm at the base. Underwent a piecemeal polypectomy. Post colonoscopy was complicated  with perforation. The patient underwent segmental resection of colon in the splenic flexure,  including tumor. Pathology report was mucinous adenocarcinoma, and metastatic to 1 out of 24  lymph nodes. 12/28/11 - Patient completed 12 cycles of adjuvant chemotherapy with FOLFOX.  Port was removed in August 2012.  Patient with no evidence of recurrence of disease as of December 2019.  2. Multiple colon polyps on colonoscopy done in May 2012. There were benign tubular villous  adenomas.  Colonoscopy in May 2015 was normal.  No polyps detected.        Current Outpatient Medications:   •  acetaminophen (TYLENOL) 500 MG tablet, Take 500 mg by mouth Every 6 (Six) Hours As Needed for Mild Pain ., Disp: , Rfl:   •  allopurinol (ZYLOPRIM) 100 MG tablet, Take 200 mg by mouth Daily., Disp: , Rfl:   •  aspirin 81 MG EC tablet, Take 81 mg by mouth Daily., Disp: , Rfl:   •  atorvastatin (LIPITOR) 20 MG tablet, Take 20 mg by mouth Every Night., Disp: , Rfl:   •  Cholecalciferol (VITAMIN D3) 50 MCG (2000 UT) capsule, Take 2,000 Units by mouth Daily., Disp: , Rfl:   •  dexamethasone (DECADRON) 2 MG tablet, Take 1 tablet by mouth Every 12 (Twelve) Hours., Disp: 60 tablet, Rfl: 0  •  fluticasone (FLONASE) 50 MCG/ACT nasal spray, 1 spray into the nostril(s) as directed by provider Daily As Needed., Disp: , Rfl:   •  furosemide (LASIX) 40 MG tablet, Take 40 mg by mouth Daily., Disp: , Rfl:   •  metoprolol tartrate (LOPRESSOR) 25 MG tablet, Take 25 mg by mouth 2 (Two) Times a Day., Disp: , Rfl:   •  omeprazole (priLOSEC) 40 MG capsule, Take 40 mg by mouth Daily As Needed., Disp: , Rfl:   •  triamterene-hydrochlorothiazide (MAXZIDE) 75-50 MG per tablet, Take 0.5 tablets by mouth Daily., Disp: , Rfl:   •  vitamin B-12 (CYANOCOBALAMIN) 1000 MCG tablet, Take 1,000 mcg by mouth Daily., Disp: , Rfl:   •   "temazepam (RESTORIL) 15 MG capsule, Take 1 capsule by mouth At Night As Needed for Sleep., Disp: 30 capsule, Rfl: 2    No Known Allergies    Family History   Problem Relation Age of Onset   • Heart failure Mother    • No Known Problems Father    • No Known Problems Sister    • No Known Problems Brother        Cancer-related family history is not on file.    Social History     Tobacco Use   • Smoking status: Former Smoker     Types: Cigarettes   • Smokeless tobacco: Never Used   • Tobacco comment: Smoked x 15 Yrs--Quit in 1982   Substance Use Topics   • Alcohol use: No     Comment: Daily Caffeine    • Drug use: No       I have reviewed the history of present illness, past medical history, family history, social history, lab results, all notes and other records since the patient was last seen at the cancer care center.    SUBJECTIVE:      Patient is my office for follow-up accompanied by his family.  Eating well has good appetite lost weight though.  Reports to fatigue and tiredness not able to sleep well.    ROS:      Review of Systems   Constitutional: Negative for fever.   HENT: Negative for nosebleeds and trouble swallowing.    Eyes: Negative for visual disturbance.   Respiratory: Negative for cough, shortness of breath and wheezing.    Cardiovascular: Negative for chest pain.   Gastrointestinal: Negative for abdominal pain and blood in stool.   Endocrine: Negative for cold intolerance.   Genitourinary: Negative for dysuria and hematuria.   Musculoskeletal: Negative for joint swelling.   Skin: Negative for rash.   Allergic/Immunologic: Negative for environmental allergies.   Neurological: Negative for seizures.   Hematological: Does not bruise/bleed easily.   Psychiatric/Behavioral: The patient is not nervous/anxious.          Objective:       Vitals:    01/20/20 1004   BP: 173/81   Pulse: (!) 41   Resp: 24   Temp: 97.7 °F (36.5 °C)   Weight: 116 kg (255 lb 6.4 oz)   Height: 185.4 cm (73\")         PHYSICAL EXAM:  "     Physical Exam   Constitutional: He is oriented to person, place, and time. No distress.   Well-built well-nourished   HENT:   Head: Normocephalic and atraumatic.   Well-healing of biopsies scar on the scalp   Eyes: Conjunctivae and EOM are normal. Right eye exhibits no discharge. Left eye exhibits no discharge. No scleral icterus.   Neck: Normal range of motion. Neck supple. No thyromegaly present.   Rhinophyma   Cardiovascular: Normal rate, regular rhythm and normal heart sounds. Exam reveals no gallop and no friction rub.   Pulmonary/Chest: Effort normal. No stridor. No respiratory distress. He has no wheezes.   Abdominal: Soft. Bowel sounds are normal. He exhibits no mass. There is no tenderness. There is no rebound and no guarding.   Musculoskeletal: Normal range of motion. He exhibits no tenderness.   Lymphadenopathy:     He has no cervical adenopathy.   Neurological: He is alert and oriented to person, place, and time. He exhibits normal muscle tone.   Skin: Skin is warm. No rash noted. He is not diaphoretic. No erythema.   Psychiatric: He has a normal mood and affect. His behavior is normal.   Nursing note and vitals reviewed.       RECENT LABS:     WBC   Date Value Ref Range Status   01/20/2020 15.02 (H) 3.40 - 10.80 10*3/mm3 Final     RBC   Date Value Ref Range Status   01/20/2020 5.62 4.14 - 5.80 10*6/mm3 Final     Hemoglobin   Date Value Ref Range Status   01/20/2020 16.0 13.0 - 17.7 g/dL Final     Hematocrit   Date Value Ref Range Status   01/20/2020 46.9 37.5 - 51.0 % Final     MCV   Date Value Ref Range Status   01/20/2020 83.5 79.0 - 97.0 fL Final     MCH   Date Value Ref Range Status   01/20/2020 28.5 26.6 - 33.0 pg Final     MCHC   Date Value Ref Range Status   01/20/2020 34.1 31.5 - 35.7 g/dL Final     RDW   Date Value Ref Range Status   01/20/2020 15.7 (H) 12.3 - 15.4 % Final     RDW-SD   Date Value Ref Range Status   01/20/2020 48.2 37.0 - 54.0 fl Final     MPV   Date Value Ref Range Status    01/20/2020 9.6 6.0 - 12.0 fL Final     Platelets   Date Value Ref Range Status   01/20/2020 272 140 - 450 10*3/mm3 Final     Neutrophil %   Date Value Ref Range Status   01/20/2020 83.7 (H) 42.7 - 76.0 % Final     Lymphocyte %   Date Value Ref Range Status   01/20/2020 7.3 (L) 19.6 - 45.3 % Final     Monocyte %   Date Value Ref Range Status   01/20/2020 8.8 5.0 - 12.0 % Final     Eosinophil %   Date Value Ref Range Status   01/20/2020 0.1 (L) 0.3 - 6.2 % Final     Basophil %   Date Value Ref Range Status   01/20/2020 0.1 0.0 - 1.5 % Final     Neutrophils, Absolute   Date Value Ref Range Status   01/20/2020 12.58 (H) 1.70 - 7.00 10*3/mm3 Final     Lymphocytes, Absolute   Date Value Ref Range Status   01/20/2020 1.09 0.70 - 3.10 10*3/mm3 Final     Monocytes, Absolute   Date Value Ref Range Status   01/20/2020 1.32 (H) 0.10 - 0.90 10*3/mm3 Final     Eosinophils, Absolute   Date Value Ref Range Status   01/20/2020 0.01 0.00 - 0.40 10*3/mm3 Final     Basophils, Absolute   Date Value Ref Range Status   01/20/2020 0.02 0.00 - 0.20 10*3/mm3 Final     nRBC   Date Value Ref Range Status   01/15/2020 0.1 0.0 - 0.2 /100 WBC Final       Lab Results   Component Value Date    GLUCOSE 154 (H) 01/15/2020    BUN 30 (H) 01/15/2020    CREATININE 1.20 01/15/2020    EGFRIFNONA 60 (L) 01/15/2020    EGFRIFAFRI >60 01/27/2017    BCR 25.0 01/15/2020    K 5.1 01/15/2020    CO2 29.0 01/15/2020    CALCIUM 10.1 01/15/2020    ALBUMIN 3.80 01/15/2020    LABIL2 1.1 04/27/2019    AST 10 01/15/2020    ALT 19 01/15/2020         Assessment/Plan      ASSESSMENT:   1. 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 History of stage III colon cancer  2. Atrial fibrillation  3. Status post CABG  4. History of colon polyps  5. Insomnia  6. ECOG 0    PLAN:      1. Patient is my office for follow-up accompanied by his family.  At this point patient will be seen by radiation oncology with the plan for whole brain radiation therapy.  The tumor was not amenable for  resection a biopsy was done.  Patient will be seen by radiation oncology later this week.  Patient will be treated with the Temodar total dose of 180 mg also radiation therapy.  2. Patient to take Decadron 4 mg 4 times a day.  3. Continue anticoagulation  4. Arrange for chemotherapy teaching  5. He is due for colonoscopy next year  6. I will see him back in my office in later this week and Gary  7. Prescription for Restoril 30 mg to take at bedtime was given    I have reviewed labs results, imaging, vitals, and medications with the patient today.  Patient verbalized understanding and is in agreement of the above plan.          This report was compiled using Dragon voice recognition software. I have made every effort to proof read this document; however, typographical errors may persist.

## 2020-01-22 NOTE — PROGRESS NOTES
"FOLLOW-UP NOTE    PATIENT NAME: Portillo Pete  MRN: [id number]  YOB: 1945  DATE SEEN: 01/22/2020    DIAGNOSIS: Portillo Pete is a 74 y.o. 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 Glioblastoma determined by biopsy of brain (CMS/HCC)      REASON FOR VISIT: Routine scheduled follow-up for Glioblastoma determined by biopsy of brain (CMS/[facility])    REASON FOR CONSULTATION/CHIEF COMPLAINT:  \"brain tumor\"     HISTORY OF PRESENT ILLNESS:   Initial presentation  · 1/4/2020 74 y.o. male who presented to the emergency department for evaluation of a 1 month ofintermittent left sided paresthesias.  He reported his left arm has had a burning sensation and his left leg felt cold.  He denied generalized or unilateral weakness.  He also noted an intermittent headache for 1 week with progressive worsening.    Workup  · MRI of the brain 1/4/10  obtained in the emergency department revealed a contrast-enhancing 2.0 x 1.7 cm mass within the right thalamus, differential considerations include glioma or metastatic disease.  There is mass-effect on the third ventricle but without definite hydrocephalus.  · Neurosurgery was consulted by the emergency department physician and Keppra and Decadron  were initiated.  · Biopsy on 1/10 showed glioblastoma WHO grade IV MGMT pending  · In the interim patient experienced worsening dizziness. He had a CT head on 1/12/20 which showed no acute postop bleeding. He notes that he continues to have blurry vision mostly in his L eye and that his coordination is off when he reaches for things. He also noted a burning and numbness sensation in his left leg.  · He has since been discharged from the hospital. He notes that he is doing well. He has occasional headaches and uses a walker. He denies syncope, seizures, changes in strength or sensation.     The following portions of the patient's history were reviewed and updated as appropriate: allergies, current medications, past " family history, past medical history, past social history, past surgical history and problem list. Reviewed with the patient and remain unchanged.    PAST MEDICAL HISTORY:  he  has a past medical history of 3-vessel CAD (04/15/2019), Allergic rhinitis (04/2018), Angina pectoris (CMS/[facility]) (Chronic), Bilateral renal cysts (04/15/2019), Bronchitis (01/2017), CAD (coronary artery disease) (11/17/2016), Cardiomegaly (11/16/16 & 02/06/2017 & 4/19/19-XR), Chronic fatigue, Colon cancer (CMS/[facility]) (Dx in 2000), Coronary artery calcification, Cyst of left kidney (04/15/2019), DVT (deep venous thrombosis) (CMS/HCC) (Hx), Dyspnea (04/2019), Elevated serum creatinine (4/15/19-[facility] IP), Enlarged prostate (04/15/2019), First degree AV block (08/09/2012), GERD (gastroesophageal reflux disease), Glioblastoma multiforme (CMS/[facility]) (1/4/2020), History of cancer chemotherapy (2011), History of chest x-ray (4/19/19-[facility]), History of EKG (08/9/12-[facility]), History of EKG (4/2019-[facility]), History of EKG (6/25/11-[facility]), History of nuclear stress test (11/16/16-[facility]), chest x-ray (11/16/16-[facility]), colonic polyps (06/24/2011), Hyperbilirubinemia (Hx), Hypertension, Kidney disease, chronic, stage III (GFR 30-59 ml/min) (CMS/[facility]), LAD stenosis (11/17/2016), LAD stenosis (04/15/2019), Left axis deviation (06/25/2011), Lower extremity edema (2017), 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  MEDICATIONS:   Current Outpatient Medications:   •  acetaminophen (TYLENOL) 500 MG tablet, Take 500 mg by mouth Every 6 (Six) Hours As  Needed for Mild Pain ., Disp: , Rfl:   •  allopurinol (ZYLOPRIM) 100 MG tablet, Take 200 mg by mouth Daily., Disp: , Rfl:   •  aspirin 81 MG EC tablet, Take 81 mg by mouth Daily., Disp: , Rfl:   •  atorvastatin (LIPITOR) 20 MG tablet, Take 20 mg by mouth Every Night., Disp: , Rfl:   •  Cholecalciferol (VITAMIN D3) 50 MCG (2000 UT) capsule, Take 2,000 Units by mouth Daily., Disp: , Rfl:   •  dexamethasone (DECADRON) 2 MG tablet, Take 1 tablet by mouth Every 12 (Twelve) Hours., Disp: 60 tablet, Rfl: 0  •  fluticasone (FLONASE) 50 MCG/ACT nasal spray, 1 spray into the nostril(s) as directed by provider Daily As Needed., Disp: , Rfl:   •  furosemide (LASIX) 40 MG tablet, Take 40 mg by mouth Daily., Disp: , Rfl:   •  metoprolol tartrate (LOPRESSOR) 25 MG tablet, Take 25 mg by mouth 2 (Two) Times a Day., Disp: , Rfl:   •  omeprazole (priLOSEC) 40 MG capsule, Take 40 mg by mouth Daily As Needed., Disp: , Rfl:   •  temazepam (RESTORIL) 15 MG capsule, Take 1 capsule by mouth At Night As Needed for Sleep. (Patient taking differently: Take 15 mg by mouth At Night As Needed for Sleep (Patient takes 2 tablets).), Disp: 30 capsule, Rfl: 2  •  temozolomide (TEMODAR) 180 MG chemo capsule, Take 1 capsule by mouth Daily for 42 doses. Take one tablets on Days D1-42, Disp: 42 capsule, Rfl: 0  •  triamterene-hydrochlorothiazide (MAXZIDE) 75-50 MG per tablet, Take 0.5 tablets by mouth Daily., Disp: , Rfl:   •  vitamin B-12 (CYANOCOBALAMIN) 1000 MCG tablet, Take 1,000 mcg by mouth Daily., Disp: , Rfl:   ALLERGIES: No Known Allergies  PAST SURGICAL HISTORY: he has a past surgical history that includes Cardiac catheterization [facility]Left, [facility]17/[facility]); Cardiac catheterization (Left, 4/15/19-F); Other surgical history (6/30/11-[facility]); Other surgical history (8/9/12-[facility]); Colectomy (2000); Colonoscopy w/ polypectomy (06/24/2011 & 05/2012); Colon surgery (06/25/2011); Coronary artery bypass graft (04/24/2019); Maze Procedure (04/23/2019); and  "Craniotomy (Right, 1/10/2020).  PREVIOUS RADIOTHERAPY OR CHEMOTHERAPY: prior chemo   FAMILY HISTORY: his family history includes Heart failure in his mother; No Known Problems in his brother, father, and sister.  SOCIAL HISTORY: he  reports that he has quit smoking. His smoking use included cigarettes. He has never used smokeless tobacco. He reports that he does not drink alcohol or use drugs.  PAIN AND PAIN MANAGEMENT: no issues  NUTRITIONAL STATUS:      Most Recent Value   Today's Session   General Information   Pregnancy Assessment   Oncology Specific Assessment   Physical Findings   Anthropometrics   Height  185.4 cm (73\")   Weight  117 kg (257 lb 3.2 oz)   Anthropometrics (Special Considerations)   Ideal Body Weight (IBW)   Ideal Body Weight (IBW) (kg)  84.86   % Ideal Body Weight  137.48   Usual Body Weight (UBW)   Body Mass Index (BMI)   BMI (kg/m2)  34   Nutritional Information   Physical Activity   Home Nutrition Report   Nutrition Prescription EN   Nutrition Prescription PN   Calculation Measurements   Height  185.4 cm (73\")   Estimated/Assessed Energy Needs   Indirect Calorimetry   Estimated/Assessed Protein Needs   Estimated/Assessed Fluid Needs   Estimated/Assessed Fiber Needs   Estimated/Assessed Electrolyte/Mineral Needs   Estimated/Assessed Vitamin Needs   Calorie Requirements for Pregnancy   Labs/Tests/Procedures/Meds   Labs/Procedures/Meds   Medications          REVIEW OF SYSTEMS:   Review of Systems   Constitutional: Positive for appetite change and fatigue. Negative for chills, diaphoresis, fever and unexpected weight change.   HENT:   Positive for hearing loss. Negative for lump/mass, mouth sores, nosebleeds, sore throat, tinnitus, trouble swallowing and voice change.    Eyes: Positive for eye problems. Negative for icterus.   Respiratory: Negative for chest tightness, cough, hemoptysis, shortness of breath and wheezing.    Cardiovascular: Negative for chest pain, leg swelling and palpitations. " "  Gastrointestinal: Positive for constipation. Negative for abdominal distention, abdominal pain, blood in stool, diarrhea, nausea, rectal pain and vomiting.   Endocrine: Negative for hot flashes.   Genitourinary: Positive for frequency. Negative for bladder incontinence, difficulty urinating, dyspareunia, dysuria, hematuria and nocturia.    Musculoskeletal: Positive for arthralgias and myalgias. Negative for back pain, flank pain, gait problem, neck pain and neck stiffness.   Skin: Negative for itching, rash and wound.   Neurological: Positive for headaches, light-headedness and numbness. Negative for dizziness, extremity weakness, gait problem, seizures and speech difficulty.   Hematological: Negative for adenopathy. Does not bruise/bleed easily.   Psychiatric/Behavioral: Positive for confusion. Negative for decreased concentration, depression, sleep disturbance and suicidal ideas. The patient is nervous/anxious.         Behavior problems.         Vitals:    01/22/20 1321   BP: 137/87   Pulse: (!) 124   Resp: 19   Temp: 98.2 °F (36.8 °C)   TempSrc: Oral   SpO2: 96%   Weight: 117 kg (257 lb 3.2 oz)   Height: 185.4 cm (73\")   PainSc: 0-No pain     No flowsheet data found.    Physical Exam    PHYSICAL EXAM:  GENERAL: in no apparent distress, sitting comfortably in room.                   HEENT: . Pupils are equal, round, reactive to light. Sclera anicteric. Conjunctiva not injected. Oropharynx without erythema, ulcerations or thrush. Poor dentition  NECK: Supple with no masses.  CARDIOVASCULAR: S1 & S2 detected; no murmurs, rubs or gallops.  CHEST: Work of breathing normal.  ABDOMEN: Abdomen is , nondistended.   MUSCULOSKELETAL:  Normal range of motion.  EXTREMITIES: no clubbing, cyanosis, edema.  SKIN: no erythema, rashes, ulcerations noted.   NEUROLOGIC: cranial nerves II-XII grossly intact bilaterally. No strength deficit, some numbness along L leg. Difficulty with coordination of movement uses " walker  PSYCHIATRIC:  alert, aware, and appropriate.      PERTINENT IMAGING/PATHOLOGY/LABS (Medical Decision Making):     COORDINATION OF CARE: A copy of this note is sent to the referring provider.    PATHOLOGY (Reviewed):  Tissue Pathology Exam collected 1/10/2020 at [facility]  Tissue Pathology Exam: [id number]   Order: [id number]   Status:  Edited   Visible to patient:  No (Not Released)   Next appt:  01/23/2020 at 10:00 AM in Neurosurgery (Jonathan Canela MD)   Dx:  Mass of brain   Component    Case Report   Surgical Pathology Report                         Case: [id number]                                   Authorizing Provider:  Jonathan Canela MD    Collected:           01/10/2020 02:47 PM           Ordering Location:     [facility] MAIN  Received:            01/10/2020 03:02 PM                                  OR                                                                            Pathologist:           Naeem Nelson MD                                                            Specimens:   1) - Brain, Right thalamic lesion                                                                    2) - Brain, Right thalamic lesion                                                          Final Diagnosis   Specimen #1 (Brain, right thalamic lesion, biopsy):    High-grade glial neoplasm consistent with glioblastoma multiforme     Specimen #2 (Brain, right thalamic lesion, biopsy):    Glioblastoma multiforme (WHO grade IV) (see comment)     DONELL/sms    Electronically signed by Naeem Nelson MD on 1/13/2020 at 1443   Comment    Specimen #1 is hypercellular with abnormal atypical cells and focal probable necrosis but without obvious neovascularization. Specimen #2, however, is also hypercellular but shows several markedly atypical cells with obvious mitotic figures along with zones of necrosis and foci of neovascularization. These findings are entirely consistent with glioblastoma  "multiforme (WHO grade IV). Specimen #2 tissue block will be sent for MGMT testing with results to follow in an addendum when available.      Intraoperative Consultation    Specimen #1 (Brain, right thalamic lesion, frozen section):    Hypercellular glial lesion with atypical cells        DONELL   Gross Description    Received without fixative for frozen section diagnosis labeled \"Right thalamic lesion\" are two light pink-tan irregular soft tissue fragments, both somewhat core-shaped with one measuring about 1 mm or so in greatest dimension and the other measuring up to about 6 mm but with a diameter of less than 1 mm. They are submitted as received for frozen section diagnosis and then for permanent sectioning in cassette FS1.      Specimen #2 is received in formalin container B labeled \"Right thalamic lesion\" and consists of several tan irregular to core-like fragments of soft tissue, 6 x 5 x 1 mm in aggregate. This material is filtered and entirely submitted in cassette 2.      DONELL/sms/tkd                  IMAGING (Reviewed):   MRI Brain With Contrast performed at [facility] on 1/10/2020.  Study Result        DATE OF EXAM:   1/10/2020 11:00 AM     PROCEDURE:   MRI BRAIN W CONTRAST-     INDICATIONS:   brain lesion; G93.89-Other specified disorders of brain     COMPARISON:  01/04/2020     TECHNIQUE:   Magnetic resonance imaging was obtained of the brain after the  administration of 20 mL of ProHance contrast intravenously. Exam carried  out for prebiopsy planning     FINDINGS:   There is an enhancing mass centered on the area of the right thalamus.  This measures 1.65 x 1.97 cm. There is mass effect on adjacent  structures including the third ventricle as well as the right aspect of  the midbrain. Edema extends into the midbrain noted on the more recent  MRI. Anterior to this area there is a small enhancing area that extends  to the right cerebral peduncle. Whether this may be more vascular or  reflects a component " of tumor separate from the main lesion is  uncertain. This has a more linear appearance.      IMPRESSION:  1.Enhancing mass centered in the area of the right thalamus with  adjacent mass effect. Separate from this area is a somewhat  linear-appearing area of enhancement 6.9 mm anterior to the main mass  which extends inferiorly to the right cerebral peduncle. Whether this  could relate to tumor as well or reflects some sort of vascular blush is  uncertain.     Electronically Signed By-Rikki Francisco On:1/10/2020 12:54 PM  This report was finalized on [id number] by  Rikki Francisco, .     MRI Brain With & Without Contrast  Performed 1/4/2020 at [facility]  Study Result     MRI BRAIN W WO CONTRAST-     Date of Exam: 1/4/2020 10:14 AM     Indication: left sided paresthesias.     Technique: Technique: Routine multiplanar multisequence MR imaging of  the brain was performed without and with the administration of 20 ml of  ProHance  gadolinium contrast.        Comparison Exams: None available.     FINDINGS:  Diffusion-weighted images demonstrate no acute infarcts.  There is no  evidence of acute intracranial hemorrhage.  There is mild generalized  parenchymal volume loss.  Within the right thalamus there is a 2.0 x 1.7  cm mass with a moderate amount of surrounding vasogenic edema.  This  mass has decreased T1 and T2 signal.  Postcontrast images demonstrate  nodular peripheral contrast enhancement.  There are also multiple linear  areas of internal enhancement.  Differential considerations would  include glioma or metastatic disease.  Glioma is favored.  There is mild  mass effect on the third ventricle.  There is no definite hydro-Cephalus  at this time.  No other contrast enhancing lesions identified.  There  are no abnormal extra-axial fluid collections.  The major intracranial  vascular flow voids are preserved.  Within the subcortical white matter  of the right frontal lobe there is a focal area of increased  T2 signal  most likely due to chronic ischemic change.  There is no associated  contrast enhancement.     Globes and orbits are within normal limits.  There is a polyp or mucous  retention cyst within the left maxillary sinus.  Other visualized  paranasal sinuses and mastoid air cells are clear.  Sella and  suprasellar cistern are within normal limits.  Craniovertebral junction  appears normal.     \           IMPRESSION:        1.  Contrast enhancing 2.0 x 1.7 cm mass within the right thalamus.   Differential considerations would include glioma or metastatic disease.   Glioma is favored.  There is mass effect on the third ventricle but  without definite hydrocephalus at this time.     Electronically Signed By-Andi Hill On:1/4/2020 11:41 AM  This report was finalized on [id number] by  Andi Hill, .     CT Head Without Contrast collected 1/14/2020 at [facility]   Study Result        DATE OF EXAM:  1/14/2020 9:09 AM     PROCEDURE:   CT HEAD WO CONTRAST-     INDICATIONS:   Dizziness; and headache and blurry vision today post right frontal  craniotomy for biopsy of right thalamic mass on 01/11/2020     COMPARISON:  CT of the head performed on 01/12/2020 and 01/11/2020 and 01/10/2020 and  MRI the brain performed on 01/10/2020     TECHNIQUE:   Routine transaxial cuts were obtained through the head without the  administration of contrast. Automated exposure control and iterative  reconstruction methods were used.     FINDINGS:  CT the head without contrast reveals that the patient is status post  recent right parietal craniotomy and is status post surgery of a small  mass in the right thalamus. Today's study reveals that there are  postoperative changes in the right thalamus. This causes mild flattening  of the posterior aspect of the right side of the third ventricle. There  might be a small tiny amount of blood in this area and there is a very  small tiny gas collection in this area consistent with  the recent  biopsy. No evidence of hydrocephalus.      IMPRESSION:     1. Postoperative changes in the right thalamus consistent with recent  surgery of a small mass in this area. There is mild inflammation and  edema and small tiny amount of blood and small tiny collection of gas in  the right thalamus. No evidence of hydrocephalus. No evidence of new  hemorrhage. There is mild flattening of the third ventricle unchanged     Electronically Signed By-DR. Darshan Gaxiola MD On:1/14/2020 9:36  AM  This report was finalized on [id number] by DR. Darshan Gaxiola MD.         LABS (Reviewed):  Hematology WBC   Date Value Ref Range Status   01/20/2020 15.02 (H) 3.40 - 10.80 10*3/mm3 Final     RBC   Date Value Ref Range Status   01/20/2020 5.62 4.14 - 5.80 10*6/mm3 Final     Hemoglobin   Date Value Ref Range Status   01/20/2020 16.0 13.0 - 17.7 g/dL Final     Hematocrit   Date Value Ref Range Status   01/20/2020 46.9 37.5 - 51.0 % Final     Platelets   Date Value Ref Range Status   01/20/2020 272 140 - 450 10*3/mm3 Final      Chemistry   Lab Results   Component Value Date    GLUCOSE 111 (H) 01/20/2020    BUN 34 (H) 01/20/2020    CREATININE 1.18 01/20/2020    EGFRIFNONA 61 01/20/2020    EGFRIFAFRI >60 01/27/2017    BCR 28.8 (H) 01/20/2020    K 4.1 01/20/2020    CO2 26.0 01/20/2020    CALCIUM 9.8 01/20/2020    ALBUMIN 4.10 01/20/2020    LABIL2 1.1 04/27/2019    AST 15 01/20/2020    ALT 40 01/20/2020       Assessment/Plan   Portillo Pete is a 74 y.o. 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 Patient has a history of colon cancer s/p resection and chemotherapy in 2011. Comorbidities include coronary artery disease, cardiomegaly, colon cancer status post chemo and surgery 9 years ago, DVT/PE, hypertension, chronic kidney disease, hyperlipidemia, obesity, paroxysmal atrial fibrillation with chronic anticoagulation with Eliquis, tobacco abuse in remission.  · Recommend obtaining IDH and MGMT  status.  · Discussed case with Dr. Canela concerning surgery and patient is not a surgical candidate. Dr. Canela presented option of laser interstitial therapy. There is evidence in recurrent cases however there is a paucity of data showing a benefit for the upfront setting.  I discussed that with this patient and he will discuss this further with Dr. Canela.   · Given patient's age his options include:  ? External beam radiation therapy (standard fractionation) with concurrent and adjuvant temozolomide with tumor treating fields (TTF). I discussed that this is the most aggressive approach but given that he has a good performance status may be a reasonable option. Also discussed that this would also be dependent on his ability to tolerate chemotherapy given comorbidity. Patient prefers this option.   ? Less aggressive options include  § For good KPS>60 and methylated: hypofractionated radiation with concurrent and adjuvant temozolomide  § For good KPS>60 and unmethylated options include hypofractionated radiation +/- temozolomide       TIME SPENT WITH PATIENT:   I spent greater than 50 minutes in face-to-face time with the patient and 45 minutes of that time were spent in counseling and coordination of care, including review of imaging and pathology; indications, goals, logistics, alternatives and risks - both common and rare - for my recommendations as well as surveillance and potential outcomes.    CC: Jonathan Canela MD Gill, Rupinder K, Devabhaktuni, Yasoda MD Parag Sevak Ramesh, MD  1/22/2020  1:28 PM

## 2020-01-23 NOTE — PROGRESS NOTES
"Subjective   Portillo Pete is a 74 y.o. male.     Chief Complaint   Patient presents with   • Post-op     Brain Mass     Visit Vitals  /68   Pulse (!) 42   Ht 185.4 cm (73\")   Wt 116 kg (256 lb)   BMI 33.78 kg/m²       History of Present Illness: Mr. Pete is here today for follow-up.  He is one-week status post a right stereotactic biopsy of thalamic lesion.  The lesion was consistent with high-grade glioma.  At this time MGMT results are pending.  He was seen by radiation oncology yesterday with plans to proceed ahead with matt protocol and include Optune therapy.  We did discuss at length the possibility of cyto-reduction using laser interstitial therapy.  Unfortunately we do not have this therapy here at [facility] but I did discuss this with Dr. Mauricio at the [facility].  He reviewed the films and felt this was a very reasonable option.  Though there is no significant prospective data there is been good anecdotal results from clinical trials.  At this time he is scheduled to see Dr. Mauricio this week.  We would still proceed ahead with radiation and chemotherapy and this procedure could be performed in conjunction with that but would need to be performed before placement of the alternating field helmet.  The patient states he is feeling better but still somewhat unsteady on his feet and is using the walker to ambulate.    The following portions of the patient's history were reviewed and updated as appropriate: allergies, current medications, past family history, past medical history, past social history, past surgical history and problem list.    Review of Systems      Past Surgical History:   Procedure Laterality Date   • CARDIAC CATHETERIZATION Left 11/17/16-[facility]    w/Arteriography/Angiography--Dr. Og--Nonobstructive CAD Involving the LAD & LCX   • CARDIAC CATHETERIZATION Left 4/15/19-[facility]    w/Arteriography/Angiography--Dr. Og--Severe 3V CAD   • COLON RESECTION  2000    " Colon Ca   • COLON SURGERY  06/25/2011    Lap L Hemicolectomy w/Transverse Sigmoid Side to Side Anastomaosis   • COLONOSCOPY W/ POLYPECTOMY  06/24/2011 & 05/2012   • CORONARY ARTERY BYPASS GRAFT  04/24/2019    X4  Dr Bermudez   • CRANIOTOMY Right 1/10/2020    Procedure: Right frontal stereotactic biopsy;  Surgeon: Jonathan Canela MD;  Location: [facility] OR;  Service: Neurosurgery   • MAZE PROCEDURE  04/23/2019    Dr Bermudez   • OTHER SURGICAL HISTORY  6/30/11-[facility]    L Subclavian Mediport Placement w/Fluoroscopy Interpretation--Akila Cartagena MD--For Stage 2 Colon Cancer   • OTHER SURGICAL HISTORY  8/9/12-[facility]    Removal of L Subclavian Mediport--Akila Cartagena MD--For Stage 1 Colon Cancer/Chemo       Past Medical History:   Diagnosis Date   • 3-vessel CAD 04/15/2019    Severe Involving Proximal LAD/LCX--Noted on Cardiac Cath    • Allergic rhinitis 04/2018   • Angina pectoris (CMS/HCC) Chronic   • Bilateral renal cysts 04/15/2019    Noted on Renal US   • Bronchitis 01/2017   • CAD (coronary artery disease) 11/17/2016    Involving the LAD/LCX--Noted on Cardiac Cath   • Cardiomegaly 11/16/16 & 02/06/2017 & 4/19/19-XR    Noted on Chest XR & CT Chest   • Chronic fatigue    • Colon cancer (CMS/HCC) Dx in 2000    Stage 2 w/Chemo--S/p Sx on 06/30/11    • Coronary artery calcification    • Cyst of left kidney 04/15/2019    1.7CM--Noted on Renal US   • DVT (deep venous thrombosis) (CMS/HCC) Hx   • Dyspnea 04/2019   • Elevated serum creatinine 4/15/19-F IP   • Enlarged prostate 04/15/2019    Noted on Renal US   • First degree AV block 08/09/2012    Noted on EKG   • GERD (gastroesophageal reflux disease)     Prilosec   • Glioblastoma multiforme (CMS/HCC) 1/4/2020   • History of cancer chemotherapy 2011    Colon Ca   • History of chest x-ray 4/19/19-[facility]    L Midlung Granulomas; Stable Cardiomegaly   • History of EKG 08/9/12-[facility]    First Degree AV Block; Premature Atrial Contractions   • History of EKG 4/2019-F   •  History of EKG 6/25/11-[facility]    L Axis Deviations; Probable Lateral Infarct; Borderline AV Conduction Delay   • History of nuclear stress test 11/16/16-[facility]    Inferoapocal Wall Ischemia Noted   • Hx of chest x-ray 11/16/16-F    Normal w/Stable Cardiomegaly   • Hx of colonic polyps 06/24/2011    Noted on Colonoscopy   • Hyperbilirubinemia Hx   • Hypertension     Controlled w/Meds   • Kidney disease, chronic, stage III (GFR 30-59 ml/min) (CMS/[facility])    • LAD stenosis 11/17/2016    40-50% LAD Disease-First Diagonal is Small w/60-70% Disease--Noted on Cardiac Cath    • LAD stenosis 04/15/2019    70% Proximal LAD Stenosis; 90% Diagonal; 80-90% First Marginal Stenosis; 2nd Marginal is Large with 60-70% Disease--Noted on Cardiac Cath    • Left axis deviation 06/25/2011    Noted on EKG   • Lower extremity edema 2017   • 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    Noted on Chest XR   • Megaloblastic anemia due to B12 deficiency    • Mixed hyperlipidemia    • Obesity    • Osteoarthritis     Knee   • Paroxysmal A-fib (CMS/[facility]) Dx 11/2016    Eliquis   • PE (pulmonary thromboembolism) (CMS/HCC) Hx   • Pulmonary nodule, right 02/06/2017    8MM--Noted on CT Chest   • Pulmonary nodules 01/27/17 & 02/06/2017    Several--Noted on CT Chest Measuring 2-3MM   • RCA occlusion (CMS/[facility]) 11/17/2016    50% Proximal Disease--Noted on Cardiac Cath    • RCA occlusion (CMS/HCC) 04/15/2019    70% Ostial Stenosis; Ostial the PDA to Proximal PDA Has 90% Disease--Noted on Cardiac Cath   • Renal cyst 04/15/2019    Noted on Renal US--R Measuring 6.8CM   • SOB (shortness of breath) 2016   • Vitamin B 12 deficiency    • Vitamin D deficiency        Objective   Physical Exam  Neurologic Exam  Ortho Exam    Alert and oriented by 3  Speech is intact and coherent  Incision is well-healed  No focal motor deficit  Unsteady antalgic gait    Assessment and Plan: Plan to proceed ahead with therapy with repeat MRI in 3 months.  Patient is to see Dr. Mauricio this week  for discussion to proceed ahead with laser interstitial therapy of the thalamic lesion.      Problems Addressed this Visit     None

## 2020-01-23 NOTE — PROGRESS NOTES
Hematology/Oncology Outpatient Follow Up    Portillo Pete  1945    Primary Care Physician: Quyen Headley  Referring Physician: Quyen Headley  Chief Complaint:   Stage III adenocarcinoma of the colon in June 2011  Glioblastoma multiform diagnosed in January 2020  History of Present Illness:     · Mr. Pete was admitted to [facility] in January 2020 with the left side paresthesias and burning sensations.  An MRI of the brain was obtained.  · 1/4/2020 MRI of the brain with and without contrast revealed- 1.  Contrast enhancing 2.0 x 1.7 cm mass within the right thalamus. Differential considerations would include glioma or metastatic disease. Glioma is favored. There is mass effect on the third ventricle but without definite hydrocephalus at this time.  · 1/5/2020 CT of the chest abdomen and pelvis were obtained without contrast- . Stable bilateral pulmonary nodules. Given the long-term stability, these are felt to be benign. 2. Stable chronic pleural/parenchymal scarring in the left lung as described. 3. Emphysema. 4. Bilateral renal masses. A few of the masses are hyperdense and are felt to be similar to the previous exam. These are difficult to characterize without contrast, but probably represent a combination of Bosniak type I and II renal cysts. 5. 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 I cannot completely exclude prostate carcinoma. 6. Thickening of the wall of the urinary bladder wall which is probably due to chronic outlet obstruction. 7. Postsurgical changes involving the splenic flexure the colon. There is fecal retention.  · 1/10/2020 biopsy of the right thalamic lesion was obtained which is positive for high-grade glial neoplasm consistent with glioblastoma multiforme( WHO grade 4)  · Patient was seen by radiation oncology during the hospital stay patient was discharged home on Decadron.  · 1/24/2020 patient was evaluated by radiation oncology and  neurosurgery for hospital follow-up.    Past Medical History:   Diagnosis Date   • 3-vessel CAD 04/15/2019    Severe Involving Proximal LAD/LCX--Noted on Cardiac Cath    • Allergic rhinitis 04/2018   • Angina pectoris (CMS/HCC) Chronic   • Bilateral renal cysts 04/15/2019    Noted on Renal US   • Bronchitis 01/2017   • CAD (coronary artery disease) 11/17/2016    Involving the LAD/LCX--Noted on Cardiac Cath   • Cardiomegaly 11/16/16 & 02/06/2017 & 4/19/19-XR    Noted on Chest XR & CT Chest   • Chronic fatigue    • Colon cancer (CMS/HCC) Dx in 2000    Stage 2 w/Chemo--S/p Sx on 06/30/11    • Coronary artery calcification    • Cyst of left kidney 04/15/2019    1.7CM--Noted on Renal US   • DVT (deep venous thrombosis) (CMS/HCC) Hx   • Dyspnea 04/2019   • Elevated serum creatinine 4/15/19-[facility] IP   • Enlarged prostate 04/15/2019    Noted on Renal US   • First degree AV block 08/09/2012    Noted on EKG   • GERD (gastroesophageal reflux disease)     Prilosec   • Glioblastoma multiforme (CMS/HCC) 1/4/2020   • History of cancer chemotherapy 2011    Colon Ca   • History of chest x-ray 4/19/19-F    L Midlung Granulomas; Stable Cardiomegaly   • History of EKG 08/9/12-[facility]    First Degree AV Block; Premature Atrial Contractions   • History of EKG 4/2019-F   • History of EKG 6/25/11-[facility]    L Axis Deviations; Probable Lateral Infarct; Borderline AV Conduction Delay   • History of nuclear stress test 11/16/16-[facility]    Inferoapocal Wall Ischemia Noted   • Hx of chest x-ray 11/16/16-F    Normal w/Stable Cardiomegaly   • Hx of colonic polyps 06/24/2011    Noted on Colonoscopy   • Hyperbilirubinemia Hx   • Hypertension     Controlled w/Meds   • Kidney disease, chronic, stage III (GFR 30-59 ml/min) (CMS/HCC)    • LAD stenosis 11/17/2016    40-50% LAD Disease-First Diagonal is Small w/60-70% Disease--Noted on Cardiac Cath    • LAD stenosis 04/15/2019    70% Proximal LAD Stenosis; 90% Diagonal; 80-90% First Marginal Stenosis; 2nd  Marginal is Large with 60-70% Disease--Noted on Cardiac Cath    • Left axis deviation 06/25/2011    Noted on EKG   • Lower extremity edema 2017   • 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    Noted on Chest XR   • Megaloblastic anemia due to B12 deficiency    • Mixed hyperlipidemia    • Obesity    • Osteoarthritis     Knee   • Paroxysmal A-fib (CMS/HCC) Dx 11/2016    Eliquis   • PE (pulmonary thromboembolism) (CMS/[facility]) Hx   • Pulmonary nodule, right 02/06/2017    8MM--Noted on CT Chest   • Pulmonary nodules 01/27/17 & 02/06/2017    Several--Noted on CT Chest Measuring 2-3MM   • RCA occlusion (CMS/HCC) 11/17/2016    50% Proximal Disease--Noted on Cardiac Cath    • RCA occlusion (CMS/HCC) 04/15/2019    70% Ostial Stenosis; Ostial the PDA to Proximal PDA Has 90% Disease--Noted on Cardiac Cath   • Renal cyst 04/15/2019    Noted on Renal US--R Measuring 6.8CM   • SOB (shortness of breath) 2016   • Vitamin B 12 deficiency    • Vitamin D deficiency        Past Surgical History:   Procedure Laterality Date   • CARDIAC CATHETERIZATION Left 11/17/16-[facility]    w/Arteriography/Angiography--Dr. Og--Nonobstructive CAD Involving the LAD & LCX   • CARDIAC CATHETERIZATION Left 4/15/19-[facility]    w/Arteriography/Angiography--Dr. Og--Severe 3V CAD   • COLON RESECTION  2000    Colon Ca   • COLON SURGERY  06/25/2011    Lap L Hemicolectomy w/Transverse Sigmoid Side to Side Anastomaosis   • COLONOSCOPY W/ POLYPECTOMY  06/24/2011 & 05/2012   • CORONARY ARTERY BYPASS GRAFT  04/24/2019    X4  Dr Bermudez   • CRANIOTOMY Right 1/10/2020    Procedure: Right frontal stereotactic biopsy;  Surgeon: Jonathan Canela MD;  Location: [facility] OR;  Service: Neurosurgery   • MAZE PROCEDURE  04/23/2019    Dr Bermudez   • OTHER SURGICAL HISTORY  6/30/11-[facility]    L Subclavian Mediport Placement w/Fluoroscopy Interpretation--Akila Cartagena MD--For Stage 2 Colon Cancer   • OTHER SURGICAL HISTORY  8/9/12-[facility]    Removal of L Subclavian Mediport--Akila  MD Mitzi--For Stage 1 Colon Cancer/Chemo   · Mr. Pete had a screening colonoscopy, in June 2011 which showed a polyp in the splenic  flexure, 2.5cm at the base. Underwent a piecemeal polypectomy. Post colonoscopy was complicated  with perforation. The patient underwent segmental resection of colon in the splenic flexure,  including tumor. Pathology report was mucinous adenocarcinoma, and metastatic to 1 out of 24  lymph nodes. 12/28/11 - Patient completed 12 cycles of adjuvant chemotherapy with FOLFOX.  Port was removed in August 2012.  Patient with no evidence of recurrence of disease as of December 2019.  2. Multiple colon polyps on colonoscopy done in May 2012. There were benign tubular villous  adenomas.  Colonoscopy in May 2015 was normal.  No polyps detected.        Current Outpatient Medications:   •  acetaminophen (TYLENOL) 500 MG tablet, Take 500 mg by mouth Every 6 (Six) Hours As Needed for Mild Pain ., Disp: , Rfl:   •  allopurinol (ZYLOPRIM) 100 MG tablet, Take 200 mg by mouth Daily., Disp: , Rfl:   •  aspirin 81 MG EC tablet, Take 81 mg by mouth Daily., Disp: , Rfl:   •  atorvastatin (LIPITOR) 20 MG tablet, Take 20 mg by mouth Every Night., Disp: , Rfl:   •  Cholecalciferol (VITAMIN D3) 50 MCG (2000 UT) capsule, Take 2,000 Units by mouth Daily., Disp: , Rfl:   •  dexamethasone (DECADRON) 4 MG tablet, Initial 4mg q6h x3d, Then 4mg q8h x 3d, Then 4mg bid x3d, Disp: 60 tablet, Rfl: 0  •  fluticasone (FLONASE) 50 MCG/ACT nasal spray, 1 spray into the nostril(s) as directed by provider Daily As Needed., Disp: , Rfl:   •  furosemide (LASIX) 40 MG tablet, Take 40 mg by mouth Daily., Disp: , Rfl:   •  metoprolol tartrate (LOPRESSOR) 25 MG tablet, Take 25 mg by mouth 2 (Two) Times a Day., Disp: , Rfl:   •  omeprazole (priLOSEC) 40 MG capsule, Take 40 mg by mouth Daily As Needed., Disp: , Rfl:   •  temazepam (RESTORIL) 15 MG capsule, Take 1 capsule by mouth At Night As Needed for Sleep. (Patient taking  differently: Take 15 mg by mouth At Night As Needed for Sleep (Patient takes 2 tablets).), Disp: 30 capsule, Rfl: 2  •  temozolomide (TEMODAR) 180 MG chemo capsule, Take 1 capsule by mouth Daily for 42 doses. Take one tablets on Days D1-42, Disp: 42 capsule, Rfl: 0  •  triamterene-hydrochlorothiazide (MAXZIDE) 75-50 MG per tablet, Take 0.5 tablets by mouth Daily., Disp: , Rfl:   •  vitamin B-12 (CYANOCOBALAMIN) 1000 MCG tablet, Take 1,000 mcg by mouth Daily., Disp: , Rfl:   •  dexamethasone (DECADRON) 2 MG tablet, Take 1 tablet by mouth Every 12 (Twelve) Hours. (Patient taking differently: Take 4 mg by mouth Take As Directed.), Disp: 60 tablet, Rfl: 0    No Known Allergies    Family History   Problem Relation Age of Onset   • Heart failure Mother    • No Known Problems Father    • No Known Problems Sister    • No Known Problems Brother        Cancer-related family history is not on file.    Social History     Tobacco Use   • Smoking status: Former Smoker     Types: Cigarettes   • Smokeless tobacco: Never Used   • Tobacco comment: Smoked x 15 Yrs--Quit in 1982   Substance Use Topics   • Alcohol use: No     Comment: Daily Caffeine    • Drug use: No       I have reviewed the history of present illness, past medical history, family history, social history, lab results, all notes and other records since the patient was last seen at the cancer care center.    SUBJECTIVE:      Patient is my office for follow-up accompanied by his family.  Eating well has good appetite lost weight though.  Is looking better than last visit earlier this week.  Eating better and sleeping better now.    ROS:      Review of Systems   Constitutional: Negative for fever.   HENT: Negative for nosebleeds and trouble swallowing.    Eyes: Negative for visual disturbance.   Respiratory: Negative for cough, shortness of breath and wheezing.    Cardiovascular: Negative for chest pain.   Gastrointestinal: Negative for abdominal pain and blood in stool.  "  Endocrine: Negative for cold intolerance.   Genitourinary: Negative for dysuria and hematuria.   Musculoskeletal: Negative for joint swelling.   Skin: Negative for rash.   Allergic/Immunologic: Negative for environmental allergies.   Neurological: Negative for seizures.   Hematological: Does not bruise/bleed easily.   Psychiatric/Behavioral: The patient is not nervous/anxious.          Objective:       Vitals:    01/23/20 1243   Weight: 116 kg (256 lb)   Height: 185.4 cm (73\")     Reviewed    PHYSICAL EXAM:      Physical Exam   Constitutional: He is oriented to person, place, and time. No distress.   Well-built well-nourished   HENT:   Head: Normocephalic and atraumatic.   Well-healing of biopsies scar on the scalp   Eyes: Conjunctivae and EOM are normal. Right eye exhibits no discharge. Left eye exhibits no discharge. No scleral icterus.   Neck: Normal range of motion. Neck supple. No thyromegaly present.   Rhinophyma   Cardiovascular: Normal rate, regular rhythm and normal heart sounds. Exam reveals no gallop and no friction rub.   Pulmonary/Chest: Effort normal. No stridor. No respiratory distress. He has no wheezes.   Abdominal: Soft. Bowel sounds are normal. He exhibits no mass. There is no tenderness. There is no rebound and no guarding.   Musculoskeletal: Normal range of motion. He exhibits no tenderness.   Lymphadenopathy:     He has no cervical adenopathy.   Neurological: He is alert and oriented to person, place, and time. He exhibits normal muscle tone.   Skin: Skin is warm. No rash noted. He is not diaphoretic. No erythema.   Psychiatric: He has a normal mood and affect. His behavior is normal.   Nursing note and vitals reviewed.       RECENT LABS:     WBC   Date Value Ref Range Status   01/20/2020 15.02 (H) 3.40 - 10.80 10*3/mm3 Final     RBC   Date Value Ref Range Status   01/20/2020 5.62 4.14 - 5.80 10*6/mm3 Final     Hemoglobin   Date Value Ref Range Status   01/20/2020 16.0 13.0 - 17.7 g/dL Final "     Hematocrit   Date Value Ref Range Status   01/20/2020 46.9 37.5 - 51.0 % Final     MCV   Date Value Ref Range Status   01/20/2020 83.5 79.0 - 97.0 fL Final     MCH   Date Value Ref Range Status   01/20/2020 28.5 26.6 - 33.0 pg Final     MCHC   Date Value Ref Range Status   01/20/2020 34.1 31.5 - 35.7 g/dL Final     RDW   Date Value Ref Range Status   01/20/2020 15.7 (H) 12.3 - 15.4 % Final     RDW-SD   Date Value Ref Range Status   01/20/2020 48.2 37.0 - 54.0 fl Final     MPV   Date Value Ref Range Status   01/20/2020 9.6 6.0 - 12.0 fL Final     Platelets   Date Value Ref Range Status   01/20/2020 272 140 - 450 10*3/mm3 Final     Neutrophil %   Date Value Ref Range Status   01/20/2020 83.7 (H) 42.7 - 76.0 % Final     Lymphocyte %   Date Value Ref Range Status   01/20/2020 7.3 (L) 19.6 - 45.3 % Final     Monocyte %   Date Value Ref Range Status   01/20/2020 8.8 5.0 - 12.0 % Final     Eosinophil %   Date Value Ref Range Status   01/20/2020 0.1 (L) 0.3 - 6.2 % Final     Basophil %   Date Value Ref Range Status   01/20/2020 0.1 0.0 - 1.5 % Final     Neutrophils, Absolute   Date Value Ref Range Status   01/20/2020 12.58 (H) 1.70 - 7.00 10*3/mm3 Final     Lymphocytes, Absolute   Date Value Ref Range Status   01/20/2020 1.09 0.70 - 3.10 10*3/mm3 Final     Monocytes, Absolute   Date Value Ref Range Status   01/20/2020 1.32 (H) 0.10 - 0.90 10*3/mm3 Final     Eosinophils, Absolute   Date Value Ref Range Status   01/20/2020 0.01 0.00 - 0.40 10*3/mm3 Final     Basophils, Absolute   Date Value Ref Range Status   01/20/2020 0.02 0.00 - 0.20 10*3/mm3 Final     nRBC   Date Value Ref Range Status   01/15/2020 0.1 0.0 - 0.2 /100 WBC Final       Lab Results   Component Value Date    GLUCOSE 111 (H) 01/20/2020    BUN 34 (H) 01/20/2020    CREATININE 1.18 01/20/2020    EGFRIFNONA 61 01/20/2020    EGFRIFAFRI >60 01/27/2017    BCR 28.8 (H) 01/20/2020    K 4.1 01/20/2020    CO2 26.0 01/20/2020    CALCIUM 9.8 01/20/2020    ALBUMIN 4.10  01/20/2020    LABIL2 1.1 04/27/2019    AST 15 01/20/2020    ALT 40 01/20/2020         Assessment/Plan      ASSESSMENT:   1. 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 History of stage III colon cancer  2. Atrial fibrillation  3. Status post CABG  4. History of colon polyps  5. Insomnia  6. ECOG 0    PLAN:      1. Patient is my office for follow-up accompanied by his family.  MGM T mutation pending.  I reviewed radiation oncology plan at this point he will be evaluated at [facility] for second opinion regarding radiation.   2.  Patient will be treated with Temodar during whole brain radiation therapy phase.  Temodar order was placed.   3. Continue Decadron twice a day 4 mg   4. continue anticoagulation  5. Arrange for chemotherapy teaching  6. I will see him back in my office in 4 weeks  7. Patient has appointment to be seen at [facility] next week  8. Continue Restoril for insomnia    I have reviewed labs results, imaging, vitals, and medications with the patient today.  Patient verbalized understanding and is in agreement of the above plan.  I spent more than 40 minutes with the patient and his extended family decides reviewing history and obtaining physical exam.  Chart was entirely reviewed with the patient and his family pathology was explained to them in detail answered all their questions arrangements was made for further follow-ups with specialists medications were refilled and chart was completed and closed    Addendum electronically signed by Allyson Gautam MD, 03/24/20, 12:22 AM.          This report was compiled using Dragon voice recognition software. I have made every effort to proof read this document; however, typographical errors may persist.

## 2020-01-23 NOTE — TELEPHONE ENCOUNTER
Spoke to Mehreen joel/ Pathology and the IDH / MGMT results were sent out on 1/13/20 and the results have not returned back yet. msd

## 2020-01-27 NOTE — TELEPHONE ENCOUNTER
Per Dr. Rola whalen for Ultram 50mg #60 1 po q 8 hrs and Meloxicam 15 mg #30 1 po qday Rf 1.    Call placed to [facility] home health nurse to advise of above. Matt per Malathi will let pt know.    Call placed to [facility] pharmacy, s/w pharmacist Leo to give scripts as per Dr. Canela.

## 2020-01-27 NOTE — TELEPHONE ENCOUNTER
Voicemal from OTIS Blanchard with [facility] reporting pt fell Sat and Sun approx 2 am each time trying to get out of bed. States fell on the left side but did not hit head. Can call back for any other questions.    Voicemail from Malathi with [facility] reporting that patient is requesting pain medication for his head. States the Tylenol is not helping him. Please advise.

## 2020-01-28 NOTE — OUTREACH NOTE
General Surgery Week 2 Survey      Responses   Facility patient discharged from?  [facility]   Does the patient have one of the following disease processes/diagnoses(primary or secondary)?  General Surgery   Week 2 attempt successful?  Yes   Call start time  0842   Call end time  0848   Is patient permission given to speak with other caregiver?  Yes   List who call center can speak with  JASIEL RAMÍREZ   Person spoke with today (if not patient) and relationship  JASIEL RAMÍREZ- WIFE   Meds reviewed with patient/caregiver?  Yes   Is the patient having any side effects they believe may be caused by any medication additions or changes?  No   Does the patient have all medications related to this admission filled (includes all antibiotics, pain medications, etc.)  N/A   Prescription comments  WIFE STATES DR. ELI OFFICE CALLED IN SOME MEDICATIONS TO [facility] FOR PAIN, BUT AS OF 5:15 LAST NIGHT THEY WERE NOT READY. WIFE STATES SHE WILL CALL THE PHARMACY THIS MORNING TO CHECK AGAIN.     Is the patient taking all medications as directed (includes completed medication regime)?  Yes   Does the patient have a follow up appointment scheduled with their surgeon?  Yes   Has the patient kept scheduled appointments due by today?  Yes   Comments  WIFE STATES THEY JUST ARRIVED FOR AN APPOINTMENT WITH A PHYSICIAN REFERRED BY DR. ELI, AT [facility].    What is the Home health agency?   [facility]   Has home health visited the patient within 72 hours of discharge?  Yes   Did the patient receive a copy of their discharge instructions?  Yes   Nursing interventions  Reviewed instructions with patient   What is the patient's perception of their health status since discharge?  Worsening [DR. ELI REFERRED PATIENT TO A PHYSICIAN AT [facility] IN [place] AND WIFE STATES THEY HAD JUST ARRIVED AT THIS APPOINTMENT AT TIME OF THIS CALL.  ]   Nursing interventions  Nurse provided patient education   Is the patient /caregiver able to  teach back basic post-op care?  Keep incision areas clean,dry and protected, Do not remove steri-strips, Lifting as instructed by MD in discharge instructions, Take showers only when approved by MD-sponge bathe until then, No tub bath, swimming, or hot tub until instructed by MD   Is the patient/caregiver able to teach back signs and symptoms of incisional infection?  Increased redness, swelling or pain at the incisonal site, Increased drainage or bleeding, Incisional warmth, Pus or odor from incision, Fever   Is the patient/caregiver able to teach back steps to recovery at home?  Set small, achievable goals for return to baseline health, Rest and rebuild strength, gradually increase activity, Make a list of questions for surgeon's appointment   Is the patient/caregiver able to teach back the hierarchy of who to call/visit for symptoms/problems? PCP, Specialist, Home health nurse, Urgent Care, ED, 911  Yes   Week 2 call completed?  Yes   Revoked  No further contact(revokes)-requires comment          Delores Pettit LPN

## 2020-01-31 NOTE — TELEPHONE ENCOUNTER
Temodar was submitted to [facility]. On 1/23, Milana at [facility] notified us that patient said he is working on supplemental insurance. In meantime, there is no foundation money available and patient co-pay is over $274. I forwarded that message to Julio Alvarez, our financial counselor, asking if he can help find assistance and copied Milana at [facility]. On 1/27/20 Milana asked again what they should about his Temodar. Another email was sent to Julio asking for status of assistance, with [facility] being copied. Neither I nor [facility] has been updated. Reviewing with Sharon Martinez.

## 2020-02-03 NOTE — TELEPHONE ENCOUNTER
Per phone call to Dr. Mauricio, Neurological Surgery. Spoke to Yue. She stated that patient had an appointment on 1/28/20 with Dr. Mauricio and has a f/u appointment on 2/14/20.   Yue will fax the progress note from 1/28/20 to me. msd    I called [facility] and so far there are no appointments for patient there. msd

## 2020-02-03 NOTE — TELEPHONE ENCOUNTER
Patient needs assistance with $274.58 copay.  I do not find a kiara for this dx.  I was told that [employer] no longer offer PAP with this medication.  I contact our  and found this is true.  I discussed this with Bekah WALLS.  The patient is going to [facility] for radiation treatment and when he returns, he will need Temodar.  Bekah is going to discussed with Dr Yu.      I checked the Medicaid website.  Patient is not eligible.

## 2020-02-05 NOTE — TELEPHONE ENCOUNTER
Julio with [facility] called to get an update on patient. Please give him a call at [phone].

## 2020-02-19 PROBLEM — I48.91 ATRIAL FIBRILLATION WITH RVR (HCC): Status: ACTIVE | Noted: 2020-01-01

## 2020-02-20 PROBLEM — I10 ESSENTIAL HYPERTENSION: Chronic | Status: ACTIVE | Noted: 2020-01-01

## 2020-02-20 PROBLEM — G81.94 LEFT HEMIPARESIS (HCC): Chronic | Status: ACTIVE | Noted: 2020-01-01

## 2020-02-20 NOTE — TELEPHONE ENCOUNTER
Attempted to contact Dr. Hanley office to let them know that pt does not have insurance that will cover his Temodar. There is no money available through the drug company or [facility] for this drug. Will attempt to call again tomorrow.  Care plan evtered for Avastin 10mg/kg every 2 weeks per order from Dr. Bender.

## 2020-02-25 PROBLEM — I49.5 TACHYCARDIA-BRADYCARDIA (HCC): Status: ACTIVE | Noted: 2020-01-01

## 2020-02-25 PROBLEM — I49.5 SICK SINUS SYNDROME (HCC): Status: ACTIVE | Noted: 2020-01-01

## 2020-02-25 PROBLEM — E78.2 MIXED HYPERLIPIDEMIA: Status: ACTIVE | Noted: 2020-01-01

## 2020-02-25 PROBLEM — I48.20 ATRIAL FIBRILLATION, CHRONIC (HCC): Status: ACTIVE | Noted: 2020-01-01

## 2020-02-27 NOTE — OUTREACH NOTE
Prep Survey      Responses   Facility patient discharged from?  [facility]   Is patient eligible?  No   What are the reasons patient is not eligible?  [facility]   Discharge diagnosis  packmaker,  thoracic venogram,  CP/AFIB/RVR   Does the patient have one of the following disease processes/diagnoses(primary or secondary)?  General Surgery   Prep survey completed?  Yes          Danielle Lee RN

## 2020-03-04 NOTE — TELEPHONE ENCOUNTER
Marsha with [facility] needs report faxed to [phone] and release signed on report if monitoring is not necessary during radiation therapy. Radiation end date 4/15/20.

## 2020-03-05 NOTE — TELEPHONE ENCOUNTER
Left message for mr marsh to call back get appointment for treatment   -will continue po clindamycin and po Levaquin D4/10  -appreciate ID input  -discuss with ID the need for contact isolation  -monitor vital signs/BP off IV fluids and pressors Clindamycin D6/10  Levaquin D6/10  -appreciate ID input  - toxicology consult  -discuss with ID the etiology of these recurrent episodes  -monitor vital signs/BP off IV fluids and pressors. continue abx for a 10 day course Clindamycin D5/10  Levaquin D5/10  -appreciate ID input  -discuss with ID the etiology of these recurrent episodes  -monitor vital signs/BP off IV fluids and pressors.

## 2020-03-06 NOTE — PROGRESS NOTES
[facility] Medical Oncology     Education for Administration of Chemotherapy and/or Biotherapy     03/06/20    Portillo Pete  [id number]    Portillo Pete is here today for education on their upcoming Chemotherapy and/or Biotherapy.     I will be going over their treatment options, obtain signed consent and answer any questions that they may have in regards to the administration of MZASI. (Specific drug names listed below).     Portillo Pete has already consulted with Dr.Yasoda Gautam for the treatment of glioblastoma. The provider has gone over the same treatment options with the patient and answered their question prior to today's visit.   The goal of the treatment is to:    [] Cure my cancer - means treatment that kills cancer cells to the point my doctor                                     cannot find them in my body and they will not grow back.    [] Control my cancer - means treatment that keeps cancer from spreading or growing.    [x] Relieve my cancer symptoms - means treatment that helps problems such as pain or                                     pressure.     This treatment has been explained to Portillo Pete. Alternative methods of treatment, if any, have been explained to Portillo Pete as have the benefits and risks of each. Based on the physician's explanation of the benefits and risks of this treatment and any alternatives available, The patient agrees that the potential benefit's out weighs the risks involved. I have explained to the patient the most likely complications that might occur from this treatment. The patient understands that along with the treatment additional medications may be necessary to lesson the side effects. Possible side effect may include but are not limited to, any of the following, or a combination of the following:      Allergic Reaction Vision/Eye Changes Sexual Effects    []  High Blood Pressure  []  Skin and nail changes  []  Menopausal symptoms   []   Hearing Loss []  Ulceration at injection site []  Menstrual irregularities   []  Fatigue []  Skin rash   []  Fertility effects   []  Constipation  []  Diarrhea []  Hair loss  []  Light/temperature sensitivity []  Heart damage  []  Liver damage   []  Loss of appetite []  Dizziness []  Lung damage   []  Mouth Sores []  Muscle aching or weakness []  Kidney damage   []  Taste Changes []  Forgetfulness []  Nerve damage   []  Nausea or Vomiting []  Risk of blood clots []  Weight gain/loss   []  Secondary malignancies []  Risk of anemia  []  Risk of bleeding/bruising      While receiving treatment, it has been explained to the patient with regards to their blood counts. This may include but not limited to CBC, Neutropenia ,Anemia, Thrombocytopenia. This handout has been explained and given to the patient.     It was explained to the patient about nutrition and how important it is while undergoing Chemotherapy and/or Biotherapy. Certain medications will be prescribed during the treatment which may change the way foods taste or smell. These changes may cause poor or no appetite. Food is fuel for your body, and if it does not get the fuel it needs, your body may become mal-nourished, which can lead to sever fatigue.It was discussed with the patient about calories and how to add high-calorie foods to their diet.  Protein was also mentioned in regards to how this will help make new cells for the body. Information was given to Portillo Pete in regards to some good protein sources.   We also discussed with the patient how important it was to drink/eat every 2-3 hours while awake. We discussed fluid intake of at least 6 8 ounce glassed of liquids per day to stay hydrated. Some of those are listed below:     Water  Juice (fruit or vegetable)  Soda Sport Drinks Soup   Milk  Ensure, Boost, Glucerna Ice Cream Popsicles Jello   Milkshakes Pudding  Gatorade Sherbert Yogurt     It has been discussed with the patient the risks of  becoming pregnant while receiving Chemotherapy and/or Biotherapy. We also discussed the importance of using reliable barrier methods while participating in intimate activities as this may expose their partners to a potentially harmful drug.     Further home instructions were given to the patient in regards to symptoms, treatment and how to handle those situations as well as when to contact the treatment team or the providers office.     Portillo Pete was given handouts on:   1. Complete Blood Counts and terminology  2. Nutrition during Cancer Therapy   3. Home Instructions    I have discussed and gone over the full consent with the patient and answered all their questions regarding the medication they are to receive.  Written information has been provided and reviewed with Portillo Pete. The patient and their family had a chance to ask any questions about the treatment medications and are satisfied with the information that was provided to them.     The patient has read and completed the consent form. They understand the possible risks and benefits of the recommended treatment plan and voluntarily agree to undergo the planned treatment. Should they change their mind and decide to stop treatment at any time, they will notify the providers office.       Carmen Dickerson RN  3/6/2020  1300

## 2020-03-06 NOTE — PROGRESS NOTES
Called [facility] and spoke with patients nurse and advised to give patient Zofran 4mg Q6  HRS per Freya Walls NP

## 2020-03-18 NOTE — PROGRESS NOTES
Subjective:     Encounter Date:03/18/2020      Patient ID: Portillo Pete is a 74 y.o. male.    Chief Complaint:  History of Present Illness 74-year-old white male with history of coronary artery disease history of hypertension hyperlipidemia atrial fibrillation with decubitus normal status post pacemaker placement history of brain tumor status post surgery and radiation chronic renal insufficiency presents to my office for follow-up.  Patient is currently stable without any symptoms of chest pain but has some shortness of breath with exertion.  No complains of any PND orthopnea.  No palpitation dizziness syncope or swelling of the feet.  He is taking his medicines regularly.  He also has some visual disturbances.  He is not able to walk very well.  He is in a wheelchair    The following portions of the patient's history were reviewed and updated as appropriate: allergies, current medications, past family history, past medical history, past social history, past surgical history and problem list.  Past Medical History:   Diagnosis Date   • 3-vessel CAD 04/15/2019    Severe Involving Proximal LAD/LCX--Noted on Cardiac Cath    • Allergic rhinitis 04/2018   • Angina pectoris (CMS/HCC) Chronic   • Bilateral renal cysts 04/15/2019    Noted on Renal US   • Bronchitis 01/2017   • CAD (coronary artery disease) 11/17/2016    Involving the LAD/LCX--Noted on Cardiac Cath   • Cardiomegaly 11/16/16 & 02/06/2017 & 4/19/19-XR    Noted on Chest XR & CT Chest   • Chronic fatigue    • Colon cancer (CMS/HCC) Dx in 2000    Stage 2 w/Chemo--S/p Sx on 06/30/11    • Coronary artery calcification    • Cyst of left kidney 04/15/2019    1.7CM--Noted on Renal US   • DVT (deep venous thrombosis) (CMS/HCC) Hx   • Dyspnea 04/2019   • Elevated serum creatinine 4/15/19-[facility] IP   • Enlarged prostate 04/15/2019    Noted on Renal US   • First degree AV block 08/09/2012    Noted on EKG   • GERD (gastroesophageal reflux disease)     Prilosec   •  Glioblastoma multiforme (CMS/HCC) 1/4/2020   • History of cancer chemotherapy 2011    Colon Ca   • History of chest x-ray 4/19/19-[facility]    L Midlung Granulomas; Stable Cardiomegaly   • History of EKG 08/9/12-[facility]    First Degree AV Block; Premature Atrial Contractions   • History of EKG 4/2019-[facility]   • History of EKG 6/25/11-[facility]    L Axis Deviations; Probable Lateral Infarct; Borderline AV Conduction Delay   • History of nuclear stress test 11/16/16-[facility]    Inferoapocal Wall Ischemia Noted   • Hx of chest x-ray 11/16/16-[facility]    Normal w/Stable Cardiomegaly   • Hx of colonic polyps 06/24/2011    Noted on Colonoscopy   • Hyperbilirubinemia Hx   • Hypertension     Controlled w/Meds   • Kidney disease, chronic, stage III (GFR 30-59 ml/min) (CMS/[facility])    • LAD stenosis 11/17/2016    40-50% LAD Disease-First Diagonal is Small w/60-70% Disease--Noted on Cardiac Cath    • LAD stenosis 04/15/2019    70% Proximal LAD Stenosis; 90% Diagonal; 80-90% First Marginal Stenosis; 2nd Marginal is Large with 60-70% Disease--Noted on Cardiac Cath    • Left axis deviation 06/25/2011    Noted on EKG   • Lower extremity edema 2017   • 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    Noted on Chest XR   • Megaloblastic anemia due to B12 deficiency    • Mixed hyperlipidemia    • Obesity    • Osteoarthritis     Knee   • Paroxysmal A-fib (CMS/[facility]) Dx 11/2016    Eliquis   • PE (pulmonary thromboembolism) (CMS/[facility]) Hx   • Pulmonary nodule, right 02/06/2017    8MM--Noted on CT Chest   • Pulmonary nodules 01/27/17 & 02/06/2017    Several--Noted on CT Chest Measuring 2-3MM   • RCA occlusion (CMS/[facility]) 11/17/2016    50% Proximal Disease--Noted on Cardiac Cath    • RCA occlusion (CMS/[facility]) 04/15/2019    70% Ostial Stenosis; Ostial the PDA to Proximal PDA Has 90% Disease--Noted on Cardiac Cath   • Renal cyst 04/15/2019    Noted on Renal US--R Measuring 6.8CM   • SOB (shortness of breath) 2016   • Vitamin B 12 deficiency    • Vitamin D deficiency      Past Surgical  "History:   Procedure Laterality Date   • CARDIAC CATHETERIZATION Left 11/17/16-[facility]    w/Arteriography/Angiography--Dr. Og--Nonobstructive CAD Involving the LAD & LCX   • CARDIAC CATHETERIZATION Left 4/15/19-[facility]    w/Arteriography/Angiography--Dr. Og--Severe 3V CAD   • CARDIAC CATHETERIZATION N/A 2/25/2020    Procedure: Thoracic venogram;  Surgeon: Girma Laughlin MD;  Location: [facility] CATH INVASIVE LOCATION;  Service: Cardiovascular;  Laterality: N/A;   • CARDIAC ELECTROPHYSIOLOGY PROCEDURE Left 2/25/2020    Procedure: Pacemaker SC new;  Surgeon: Girma Laughlin MD;  Location:  [facility] CATH INVASIVE LOCATION;  Service: Cardiovascular;  Laterality: Left;   • COLON RESECTION  2000    Colon Ca   • COLON SURGERY  06/25/2011    Lap L Hemicolectomy w/Transverse Sigmoid Side to Side Anastomaosis   • COLONOSCOPY W/ POLYPECTOMY  06/24/2011 & 05/2012   • CORONARY ARTERY BYPASS GRAFT  04/24/2019    X4  Dr Bermudez   • CRANIOTOMY Right 1/10/2020    Procedure: Right frontal stereotactic biopsy;  Surgeon: Jonathan Canela MD;  Location: [facility] MAIN OR;  Service: Neurosurgery   • MAZE PROCEDURE  04/23/2019    Dr Bermudez   • OTHER SURGICAL HISTORY  6/30/11-[facility]    L Subclavian Mediport Placement w/Fluoroscopy Interpretation--Akila Cartagena MD--For Stage 2 Colon Cancer   • OTHER SURGICAL HISTORY  8/9/12-[facility]    Removal of L Subclavian Mediport--Akila Cartagena MD--For Stage 1 Colon Cancer/Chemo     /76   Pulse 60   Ht 182.9 cm (72\")   Wt 117 kg (258 lb)   SpO2 96%   BMI 34.99 kg/m²   Family History   Problem Relation Age of Onset   • Heart failure Mother    • No Known Problems Father    • No Known Problems Sister    • No Known Problems Brother        Current Outpatient Medications:   •  acetaminophen (TYLENOL) 500 MG tablet, Take 500 mg by mouth Every 6 (Six) Hours As Needed for Mild Pain ., Disp: , Rfl:   •  allopurinol (ZYLOPRIM) 100 MG tablet, Take 200 mg by mouth Daily., Disp: , Rfl:   •  ALPRAZolam " (XANAX) 0.25 MG tablet, Take 0.25 mg by mouth 2 (Two) Times a Day As Needed for Anxiety., Disp: , Rfl:   •  amoxicillin (AMOXIL) 500 MG capsule, Take 1,000 mg by mouth 2 (Two) Times a Day., Disp: , Rfl:   •  apixaban (ELIQUIS) 5 MG tablet tablet, Take 1 tablet by mouth Every 12 (Twelve) Hours. Indications: Atrial Fibrillation, Disp: 60 tablet, Rfl:   •  atorvastatin (LIPITOR) 20 MG tablet, Take 20 mg by mouth Every Night., Disp: , Rfl:   •  Cholecalciferol (VITAMIN D3) 50 MCG (2000 UT) capsule, Take 2,000 Units by mouth Daily., Disp: , Rfl:   •  dexamethasone (DECADRON) 2 MG tablet, Take 2 mg by mouth 2 (Two) Times a Day With Meals., Disp: , Rfl:   •  digoxin (LANOXIN) 125 MCG tablet, Take 1 tablet by mouth Daily., Disp: , Rfl:   •  escitalopram (LEXAPRO) 10 MG tablet, Take 10 mg by mouth Daily., Disp: , Rfl:   •  fluticasone (FLONASE) 50 MCG/ACT nasal spray, 2 sprays into the nostril(s) as directed by provider Daily., Disp: , Rfl:   •  furosemide (LASIX) 40 MG tablet, Take 0.5 tablets by mouth 2 (Two) Times a Day., Disp: , Rfl:   •  insulin lispro (humaLOG) 100 UNIT/ML injection, Inject  under the skin into the appropriate area as directed 3 (Three) Times a Day Before Meals. 151-200 2U, 201-250 4U, 251-300 6U, 301-350 8U, 351-400 10U, Disp: , Rfl:   •  magnesium hydroxide (MILK OF MAGNESIA) 400 MG/5ML suspension, Take  by mouth Daily As Needed for Constipation., Disp: , Rfl:   •  metoprolol tartrate (LOPRESSOR) 25 MG tablet, Take 25 mg by mouth 2 (Two) Times a Day., Disp: , Rfl:   •  omeprazole (priLOSEC) 40 MG capsule, Take 40 mg by mouth Daily As Needed., Disp: , Rfl:   •  ondansetron (ZOFRAN) 4 MG tablet, Take 1 tablet by mouth Every 6 (Six) Hours As Needed for Nausea or Vomiting., Disp: 30 tablet, Rfl: 1  •  oxyCODONE-acetaminophen (PERCOCET) 5-325 MG per tablet, Take 1 tablet by mouth Every 4 (Four) Hours As Needed for Moderate Pain  or Severe Pain  (Headache)., Disp: 15 tablet, Rfl: 0  •  temazepam (RESTORIL)  15 MG capsule, Take 30 mg by mouth At Night As Needed for Sleep., Disp: , Rfl:   •  vitamin B-12 (CYANOCOBALAMIN) 1000 MCG tablet, Take 1,000 mcg by mouth Daily., Disp: , Rfl:   •  zinc oxide 20 % ointment, Apply  topically to the appropriate area as directed As Needed., Disp: , Rfl:   No Known Allergies  Social History     Socioeconomic History   • Marital status:      Spouse name: Sujatha   • Number of children: Not on file   • Years of education: Not on file   • Highest education level: Not on file   Occupational History   • Occupation: RETIRED   Tobacco Use   • Smoking status: Former Smoker     Types: Cigarettes   • Smokeless tobacco: Never Used   • Tobacco comment: Smoked x 15 Yrs--Quit in 1982   Substance and Sexual Activity   • Alcohol use: No     Comment: Daily Caffeine    • Drug use: No   • Sexual activity: Defer     Review of Systems   Constitution: Negative for fever and malaise/fatigue.   Cardiovascular: Negative for chest pain, dyspnea on exertion, leg swelling and palpitations.   Respiratory: Positive for shortness of breath. Negative for cough.    Skin: Negative for rash.   Gastrointestinal: Negative for abdominal pain, nausea and vomiting.   Neurological: Positive for numbness. Negative for focal weakness, headaches and light-headedness.   All other systems reviewed and are negative.             Objective:     Physical Exam   Constitutional: He appears well-developed and well-nourished.   HENT:   Head: Normocephalic and atraumatic.   Eyes: Conjunctivae are normal. No scleral icterus.   Neck: Normal range of motion. Neck supple. No JVD present. Carotid bruit is not present.   Cardiovascular: Normal rate, regular rhythm, S1 normal, S2 normal, normal heart sounds and intact distal pulses. PMI is not displaced.   Pulmonary/Chest: Effort normal and breath sounds normal. He has no wheezes. He has no rales.   Abdominal: Soft. Bowel sounds are normal.   Neurological: He is alert. He has normal  strength.   Skin: Skin is warm and dry. No rash noted.     Procedures    Lab Review:       Assessment:          Diagnosis Plan   1. Chronic coronary artery disease     2. Paroxysmal atrial fibrillation (CMS/HCC)     3. Mixed hyperlipidemia     4. Essential hypertension     5. Sick sinus syndrome (CMS/HCC)     6. Tachycardia-bradycardia (CMS/HCC)            Plan:       Patient has history of coronary disease status post and placement the past and is currently stable on medical therapy  Patient has normal LV systolic function  Patient has history of atrial fibrillation and is currently on medical therapy with the metoprolol and Eliquis  Patient had brain tumor and status post surgery  Patient also has decubitus syndrome and status post pacemaker placement and his pacemaker is working very well  Patient blood pressure and heart rate are stable  Patient's lipid levels are followed by the primary care doctor.  Continue current medicines and follow in 6 months

## 2020-03-18 NOTE — TELEPHONE ENCOUNTER
Patient in office today, PM interrogated and patient paced 1% time. Copy given to wife to take to radiation therapy, does not require monitoring during radiation. Dr Og to sign.

## 2020-03-20 NOTE — ADDENDUM NOTE
Encounter addended by: Britta Demarco RN on: 3/20/2020 2:41 PM   Actions taken: Visit Navigator Flowsheet section accepted

## 2020-04-02 NOTE — TELEPHONE ENCOUNTER
Case Management/ Note    Patient Name: Portillo Pete  YOB: 1945  MRN #: [id number]    OSW called and asked to speak with patient. Sujatha said he was working with PT; she answered pre-screen questions which are negative. She said they are doing well and basic needs are met. They are staying in as much as possible. She is aware OSW is working from home and has office number to call if they have psychosocial needs.     Electronically signed by:   Omaira Arteaga LCSW, OSW-C  04/02/20, 10:10 AM

## 2020-04-03 PROBLEM — Z51.11 ENCOUNTER FOR ANTINEOPLASTIC CHEMOTHERAPY: Status: ACTIVE | Noted: 2020-01-01

## 2020-04-03 NOTE — PROGRESS NOTES
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 Pete was admitted to [facility] in January 2020 with the left side paresthesias and burning sensations.  An MRI of the brain was obtained.  · 1/4/2020 MRI of the brain with and without contrast revealed- 1.  Contrast enhancing 2.0 x 1.7 cm mass within the right thalamus. Differential considerations would include glioma or metastatic disease. Glioma is favored. There is mass effect on the third ventricle but without definite hydrocephalus at this time.  · 1/10/2020 biopsy of the right thalamic lesion was obtained which is positive for high-grade glial neoplasm consistent with glioblastoma multiforme( WHO grade 4)  · Patient was seen by radiation oncology during the hospital stay patient was discharged home on Decadron.  · 1/24/2020 patient was evaluated by radiation oncology and neurosurgery for hospital follow-up.  · Patient receiving radiation therapy at [facility] to whole brain  · 3/6/2020 patient was initiated on Avastin to be given every other week during radiation      HPI reviewed  Past Medical History:   Diagnosis Date   • 3-vessel CAD 04/15/2019    Severe Involving Proximal LAD/LCX--Noted on Cardiac Cath    • Allergic rhinitis 04/2018   • Angina pectoris (CMS/HCC) Chronic   • Bilateral renal cysts 04/15/2019    Noted on Renal US   • Bronchitis 01/2017   • CAD (coronary artery disease) 11/17/2016    Involving the LAD/LCX--Noted on Cardiac Cath   • Cardiomegaly 11/16/16 & 02/06/2017 & 4/19/19-XR    Noted on Chest XR & CT Chest   • Chronic fatigue    • Colon cancer (CMS/HCC) Dx in 2000    Stage 2 w/Chemo--S/p Sx on 06/30/11    • Coronary artery calcification    • Cyst of left kidney  04/15/2019    1.7CM--Noted on Renal US   • DVT (deep venous thrombosis) (CMS/[facility]) Hx   • Dyspnea 04/2019   • Elevated serum creatinine 4/15/19-F IP   • Enlarged prostate 04/15/2019    Noted on Renal US   • First degree AV block 08/09/2012    Noted on EKG   • GERD (gastroesophageal reflux disease)     Prilosec   • Glioblastoma multiforme (CMS/[facility]) 1/4/2020   • History of cancer chemotherapy 2011    Colon Ca   • History of chest x-ray 4/19/19-[facility]    L Midlung Granulomas; Stable Cardiomegaly   • History of EKG 08/9/12-[facility]    First Degree AV Block; Premature Atrial Contractions   • History of EKG 4/2019-[facility]   • History of EKG 6/25/11-[facility]    L Axis Deviations; Probable Lateral Infarct; Borderline AV Conduction Delay   • History of nuclear stress test 11/16/16-[facility]    Inferoapocal Wall Ischemia Noted   • Hx of chest x-ray 11/16/16-[facility]    Normal w/Stable Cardiomegaly   • Hx of colonic polyps 06/24/2011    Noted on Colonoscopy   • Hyperbilirubinemia Hx   • Hypertension     Controlled w/Meds   • Kidney disease, chronic, stage III (GFR 30-59 ml/min) (CMS/[facility])    • LAD stenosis 11/17/2016    40-50% LAD Disease-First Diagonal is Small w/60-70% Disease--Noted on Cardiac Cath    • LAD stenosis 04/15/2019    70% Proximal LAD Stenosis; 90% Diagonal; 80-90% First Marginal Stenosis; 2nd Marginal is Large with 60-70% Disease--Noted on Cardiac Cath    • Left axis deviation 06/25/2011    Noted on EKG   • Lower extremity edema 2017   • 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    Noted on Chest XR   • Megaloblastic anemia due to B12 deficiency    • Mixed hyperlipidemia    • Obesity    • Osteoarthritis     Knee   • Paroxysmal A-fib (CMS/[facility]) Dx 11/2016    Eliquis   • PE (pulmonary thromboembolism) (CMS/[facility]) Hx   • Pulmonary nodule, right 02/06/2017    8MM--Noted on CT Chest   • Pulmonary nodules 01/27/17 & 02/06/2017    Several--Noted on CT Chest Measuring 2-3MM   • RCA occlusion (CMS/[facility]) 11/17/2016    50% Proximal Disease--Noted on Cardiac  Cath    • RCA occlusion (CMS/HCC) 04/15/2019    70% Ostial Stenosis; Ostial the PDA to Proximal PDA Has 90% Disease--Noted on Cardiac Cath   • Renal cyst 04/15/2019    Noted on Renal US--R Measuring 6.8CM   • SOB (shortness of breath) 2016   • Vitamin B 12 deficiency    • Vitamin D deficiency        Past Surgical History:   Procedure Laterality Date   • CARDIAC CATHETERIZATION Left 11/17/16-[facility]    w/Arteriography/Angiography--Dr. Og--Nonobstructive CAD Involving the LAD & LCX   • CARDIAC CATHETERIZATION Left 4/15/19-[facility]    w/Arteriography/Angiography--Dr. Og--Severe 3V CAD   • CARDIAC CATHETERIZATION N/A 2/25/2020    Procedure: Thoracic venogram;  Surgeon: Girma Laughlin MD;  Location: [facility] CATH INVASIVE LOCATION;  Service: Cardiovascular;  Laterality: N/A;   • CARDIAC ELECTROPHYSIOLOGY PROCEDURE Left 2/25/2020    Procedure: Pacemaker SC new;  Surgeon: Girma Laughlin MD;  Location: [facility] CATH INVASIVE LOCATION;  Service: Cardiovascular;  Laterality: Left;   • COLON RESECTION  2000    Colon Ca   • COLON SURGERY  06/25/2011    Lap L Hemicolectomy w/Transverse Sigmoid Side to Side Anastomaosis   • COLONOSCOPY W/ POLYPECTOMY  06/24/2011 & 05/2012   • CORONARY ARTERY BYPASS GRAFT  04/24/2019    X4  Dr Bermudez   • CRANIOTOMY Right 1/10/2020    Procedure: Right frontal stereotactic biopsy;  Surgeon: Jonathan Canela MD;  Location: [facility] MAIN OR;  Service: Neurosurgery   • MAZE PROCEDURE  04/23/2019    Dr Bermudez   • OTHER SURGICAL HISTORY  6/30/11-[facility]    L Subclavian Mediport Placement w/Fluoroscopy Interpretation--Akila Cartagena MD--For Stage 2 Colon Cancer   • OTHER SURGICAL HISTORY  8/9/12-[facility]    Removal of L Subclavian Mediport--Akila Cartagena MD--For Stage 1 Colon Cancer/Chemo   ·   · 1/5/2020 CT of the chest abdomen and pelvis were obtained without contrast- . Stable bilateral pulmonary nodules. Given the long-term stability, these are felt to be benign. 2. Stable chronic pleural/parenchymal  scarring in the left lung as described. 3. Emphysema. 4. Bilateral renal masses. A few of the masses are hyperdense and are felt to be similar to the previous exam. These are difficult to characterize without contrast, but probably represent a combination of Bosniak type I and II renal cysts. 5. 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 I cannot completely exclude prostate carcinoma. 6. Thickening of the wall of the urinary bladder wall which is probably due to chronic outlet obstruction. 7. Postsurgical changes involving the splenic flexure the colon. There is fecal retention.  ·   · Mr. Pete had a screening colonoscopy, in June 2011 which showed a polyp in the splenic  flexure, 2.5cm at the base. Underwent a piecemeal polypectomy. Post colonoscopy was complicated  with perforation. The patient underwent segmental resection of colon in the splenic flexure,  including tumor. Pathology report was mucinous adenocarcinoma, and metastatic to 1 out of 24  lymph nodes. 12/28/11 - Patient completed 12 cycles of adjuvant chemotherapy with FOLFOX.  Port was removed in August 2012.  Patient with no evidence of recurrence of disease as of December 2019.  2. Multiple colon polyps on colonoscopy done in May 2012. There were benign tubular villous  adenomas.  Colonoscopy in May 2015 was normal.  No polyps detected.        Current Outpatient Medications:   •  acetaminophen (TYLENOL) 500 MG tablet, Take 500 mg by mouth Every 6 (Six) Hours As Needed for Mild Pain ., Disp: , Rfl:   •  allopurinol (ZYLOPRIM) 100 MG tablet, Take 200 mg by mouth Daily., Disp: , Rfl:   •  ALPRAZolam (XANAX) 0.25 MG tablet, Take 0.25 mg by mouth 2 (Two) Times a Day As Needed for Anxiety., Disp: , Rfl:   •  apixaban (ELIQUIS) 5 MG tablet tablet, Take 1 tablet by mouth Every 12 (Twelve) Hours. Indications: Atrial Fibrillation, Disp: 60 tablet, Rfl:   •  atorvastatin (LIPITOR) 20 MG tablet, Take 20 mg by mouth Every Night.,  Disp: , Rfl:   •  bumetanide (BUMEX) 1 MG tablet, Take 1 mg by mouth 2 (Two) Times a Day., Disp: , Rfl:   •  Cholecalciferol (VITAMIN D3) 50 MCG (2000 UT) capsule, Take 2,000 Units by mouth Daily., Disp: , Rfl:   •  dexamethasone (DECADRON) 2 MG tablet, Take 4 mg by mouth Daily With Breakfast., Disp: , Rfl:   •  digoxin (LANOXIN) 125 MCG tablet, Take 1 tablet by mouth Daily., Disp: , Rfl:   •  escitalopram (LEXAPRO) 10 MG tablet, Take 10 mg by mouth Daily., Disp: , Rfl:   •  fluticasone (FLONASE) 50 MCG/ACT nasal spray, 2 sprays into the nostril(s) as directed by provider Daily., Disp: , Rfl:   •  furosemide (LASIX) 40 MG tablet, Take 0.5 tablets by mouth 2 (Two) Times a Day., Disp: , Rfl:   •  insulin lispro (humaLOG) 100 UNIT/ML injection, Inject  under the skin into the appropriate area as directed 3 (Three) Times a Day Before Meals. 151-200 2U, 201-250 4U, 251-300 6U, 301-350 8U, 351-400 10U, Disp: , Rfl:   •  magnesium hydroxide (MILK OF MAGNESIA) 400 MG/5ML suspension, Take  by mouth Daily As Needed for Constipation., Disp: , Rfl:   •  metoprolol tartrate (LOPRESSOR) 25 MG tablet, Take 25 mg by mouth 2 (Two) Times a Day., Disp: , Rfl:   •  omeprazole (priLOSEC) 40 MG capsule, Take 40 mg by mouth Daily As Needed., Disp: , Rfl:   •  ondansetron (ZOFRAN) 4 MG tablet, Take 1 tablet by mouth Every 6 (Six) Hours As Needed for Nausea or Vomiting., Disp: 30 tablet, Rfl: 1  •  oxyCODONE-acetaminophen (PERCOCET) 5-325 MG per tablet, Take 1 tablet by mouth Every 4 (Four) Hours As Needed for Moderate Pain  or Severe Pain  (Headache)., Disp: 15 tablet, Rfl: 0  •  temazepam (RESTORIL) 15 MG capsule, Take 30 mg by mouth At Night As Needed for Sleep., Disp: , Rfl:   •  vitamin B-12 (CYANOCOBALAMIN) 1000 MCG tablet, Take 1,000 mcg by mouth Daily., Disp: , Rfl:   •  zinc oxide 20 % ointment, Apply  topically to the appropriate area as directed As Needed., Disp: , Rfl:     No Known Allergies    Family History   Problem Relation  Age of Onset   • Heart failure Mother    • No Known Problems Father    • No Known Problems Sister    • No Known Problems Brother        Cancer-related family history is not on file.    Social History     Tobacco Use   • Smoking status: Former Smoker     Types: Cigarettes   • Smokeless tobacco: Never Used   • Tobacco comment: Smoked x 15 Yrs--Quit in 1982   Substance Use Topics   • Alcohol use: No     Comment: Daily Caffeine    • Drug use: No       I have reviewed the history of present illness, past medical history, family history, social history, lab results, all notes and other records since the patient was last seen at the cancer [facility] center.    SUBJECTIVE:      Patient is my office for follow-up accompanied by his family.  Eating well has good appetite.  Has constant headaches though.  He is on steroids twice a day.  Continues to have weakness in his right right side of the body.      ROS:      Review of Systems   Constitutional: Negative for fever.   HENT: Negative for nosebleeds and trouble swallowing.    Eyes: Negative for visual disturbance.   Respiratory: Negative for cough, shortness of breath and wheezing.    Cardiovascular: Negative for chest pain.   Gastrointestinal: Negative for abdominal pain and blood in stool.   Endocrine: Negative for cold intolerance.   Genitourinary: Negative for dysuria and hematuria.   Musculoskeletal: Negative for joint swelling.   Skin: Negative for rash.   Allergic/Immunologic: Negative for environmental allergies.   Neurological: Negative for seizures.   Hematological: Does not bruise/bleed easily.   Psychiatric/Behavioral: The patient is not nervous/anxious.    ROS reviewed      Objective:       There were no vitals filed for this visit.  Vitals reviewed    PHYSICAL EXAM:      Physical Exam   Constitutional: He is oriented to person, place, and time. No distress.   Well-built well-nourished   HENT:   Head: Normocephalic and atraumatic.   Well-healing of biopsies scar on  the scalp   Eyes: Conjunctivae and EOM are normal. Right eye exhibits no discharge. Left eye exhibits no discharge. No scleral icterus.   Neck: Normal range of motion. Neck supple. No thyromegaly present.   Rhinophyma   Cardiovascular: Normal rate, regular rhythm and normal heart sounds. Exam reveals no gallop and no friction rub.   Pulmonary/Chest: Effort normal. No stridor. No respiratory distress. He has no wheezes.   Abdominal: Soft. Bowel sounds are normal. He exhibits no mass. There is no tenderness. There is no rebound and no guarding.   Musculoskeletal: Normal range of motion. He exhibits no tenderness.   Lymphadenopathy:     He has no cervical adenopathy.   Neurological: He is alert and oriented to person, place, and time. He exhibits normal muscle tone.   Right-sided weakness   Skin: Skin is warm. No rash noted. He is not diaphoretic. No erythema.   Psychiatric: He has a normal mood and affect. His behavior is normal.   Nursing note and vitals reviewed.       RECENT LABS:     WBC   Date Value Ref Range Status   03/20/2020 11.41 (H) 3.40 - 10.80 10*3/mm3 Final     RBC   Date Value Ref Range Status   03/20/2020 5.11 4.14 - 5.80 10*6/mm3 Final     Hemoglobin   Date Value Ref Range Status   03/20/2020 15.4 13.0 - 17.7 g/dL Final     Hematocrit   Date Value Ref Range Status   03/20/2020 45.5 37.5 - 51.0 % Final     MCV   Date Value Ref Range Status   03/20/2020 89.0 79.0 - 97.0 fL Final     MCH   Date Value Ref Range Status   03/20/2020 30.1 26.6 - 33.0 pg Final     MCHC   Date Value Ref Range Status   03/20/2020 33.8 31.5 - 35.7 g/dL Final     RDW   Date Value Ref Range Status   03/20/2020 19.6 (H) 12.3 - 15.4 % Final     RDW-SD   Date Value Ref Range Status   03/20/2020 60.0 (H) 37.0 - 54.0 fl Final     MPV   Date Value Ref Range Status   03/20/2020 9.4 6.0 - 12.0 fL Final     Platelets   Date Value Ref Range Status   03/20/2020 188 140 - 450 10*3/mm3 Final     Neutrophil %   Date Value Ref Range Status    03/20/2020 82.8 (H) 42.7 - 76.0 % Final     Lymphocyte %   Date Value Ref Range Status   03/20/2020 9.0 (L) 19.6 - 45.3 % Final     Monocyte %   Date Value Ref Range Status   03/20/2020 7.5 5.0 - 12.0 % Final     Eosinophil %   Date Value Ref Range Status   03/20/2020 0.4 0.3 - 6.2 % Final     Basophil %   Date Value Ref Range Status   03/20/2020 0.3 0.0 - 1.5 % Final     Neutrophils, Absolute   Date Value Ref Range Status   03/20/2020 9.44 (H) 1.70 - 7.00 10*3/mm3 Final     Lymphocytes, Absolute   Date Value Ref Range Status   03/20/2020 1.03 0.70 - 3.10 10*3/mm3 Final     Monocytes, Absolute   Date Value Ref Range Status   03/20/2020 0.86 0.10 - 0.90 10*3/mm3 Final     Eosinophils, Absolute   Date Value Ref Range Status   03/20/2020 0.05 0.00 - 0.40 10*3/mm3 Final     Basophils, Absolute   Date Value Ref Range Status   03/20/2020 0.03 0.00 - 0.20 10*3/mm3 Final     nRBC   Date Value Ref Range Status   02/26/2020 1.0 (H) 0.0 - 0.2 /100 WBC Final   02/19/2020 0.1 0.0 - 0.2 /100 WBC Final       Lab Results   Component Value Date    GLUCOSE 109 (H) 03/20/2020    BUN 25 (H) 03/20/2020    CREATININE 0.69 (L) 03/20/2020    EGFRIFNONA 113 03/20/2020    EGFRIFAFRI >60 01/27/2017    BCR 36.2 (H) 03/20/2020    K 3.9 03/20/2020    CO2 27.0 03/20/2020    CALCIUM 9.5 03/20/2020    ALBUMIN 4.10 01/20/2020    LABIL2 1.1 04/27/2019    AST 15 01/20/2020    ALT 40 01/20/2020         Assessment/Plan      ASSESSMENT:   1. 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 History of stage III colon cancer  2. Atrial fibrillation  3. Status post CABG  4. History of colon polyps  5. Insomnia  6. ECOG 0    PLAN:      1. Patient is my office for follow-up accompanied by his family.  I will obtain radiation report from the [facility].  2. Temodar was not available so patient is being treated with Avastin during the radiation treatments.  3. Given the headaches I am increasing Decadron 4 mg to 3 times a day.  Continue Decadron twice a day  4 mg   4. continue anticoagulation  5. Arrange for port placement  6. I will see him back in my office in 4 weeks  7. Continue radiation therapy and he has a week more left.  8. Continue Restoril for insomnia    I have reviewed labs results, imaging, vitals, and medications with the patient today.  Patient verbalized understanding and is in agreement of the above plan.  I spent 40 total minutes, face-to-face, caring for Portillo and his family today.  Greater than 50% of the time of this time involved counseling and/or coordination of care as documented within this note.  Chart was entirely reviewed with the patient and his family pathology was explained to them in detail answered all their questions arrangements was made for further follow-ups with specialists medications were refilled and chart was completed and closed    Electronically signed by Allyson Gautam MD, 04/03/20, 3:38 PM.            This report was compiled using Dragon voice recognition software. I have made every effort to proof read this document; however, typographical errors may persist.

## 2020-04-03 NOTE — PROGRESS NOTES
Patient here for Mvasi. He is a very difficult stick. After 4 attempts discussed with Dr. Yu and she said she wanted us to try again if patient was willing b/c he really needs to get his treatment and then she will order a port. After the 5th attempt, I got a 22g in his RAC. I clarified with Dr. Yu that it is ok to treat with an AC IV.

## 2020-04-20 PROBLEM — Z95.0 PACEMAKER: Status: ACTIVE | Noted: 2020-01-01

## 2020-04-28 PROBLEM — Z45.2 ENCOUNTER FOR FITTING AND ADJUSTMENT OF VASCULAR CATHETER: Status: ACTIVE | Noted: 2020-01-01

## 2020-04-28 NOTE — PROGRESS NOTES
Pt here with wife for MVasi he is incontinent all of the time so he was unable to five us a urine sample for tx protocol. After waiting for a sample for 2 hours Dr. Yu was consulted along with BURT Heredia Np and order received to treat without sample benefits out wayed the risk. Pt family was informed and agreed.Pharmacy notified

## 2020-04-29 PROBLEM — N18.30 ACUTE RENAL FAILURE SUPERIMPOSED ON STAGE 3 CHRONIC KIDNEY DISEASE (HCC): Status: ACTIVE | Noted: 2020-01-01

## 2020-04-29 PROBLEM — A41.9 SEPTIC SHOCK (HCC): Status: ACTIVE | Noted: 2020-01-01

## 2020-04-29 PROBLEM — R65.21 SEPTIC SHOCK (HCC): Status: ACTIVE | Noted: 2020-01-01

## 2020-04-29 PROBLEM — I07.1 TRICUSPID REGURGITATION: Status: ACTIVE | Noted: 2020-01-01

## 2020-04-29 PROBLEM — N17.9 ACUTE RENAL FAILURE SUPERIMPOSED ON STAGE 3 CHRONIC KIDNEY DISEASE (HCC): Status: ACTIVE | Noted: 2020-01-01

## 2020-04-29 PROBLEM — A41.9 SEPSIS (HCC): Status: ACTIVE | Noted: 2020-01-01

## 2020-04-29 PROBLEM — R79.89 ELEVATED BRAIN NATRIURETIC PEPTIDE (BNP) LEVEL: Status: ACTIVE | Noted: 2020-01-01

## 2020-04-29 PROBLEM — E87.20 LACTIC ACIDOSIS: Status: ACTIVE | Noted: 2020-01-01

## 2020-04-29 PROBLEM — J18.9 CAP (COMMUNITY ACQUIRED PNEUMONIA): Status: ACTIVE | Noted: 2020-01-01

## 2020-04-29 PROBLEM — E11.9 TYPE 2 DIABETES MELLITUS (HCC): Status: ACTIVE | Noted: 2020-01-01

## 2020-04-29 PROBLEM — Z20.822 SUSPECTED COVID-19 VIRUS INFECTION: Status: ACTIVE | Noted: 2020-01-01

## 2020-04-29 PROBLEM — N28.9 RENAL INSUFFICIENCY: Status: RESOLVED | Noted: 2020-01-01 | Resolved: 2020-01-01

## 2020-04-29 PROBLEM — I34.0 MITRAL VALVE REGURGITATION: Status: ACTIVE | Noted: 2020-01-01

## 2020-04-29 PROBLEM — R41.82 ALTERED MENTAL STATUS: Status: ACTIVE | Noted: 2020-01-01

## 2020-04-29 PROBLEM — E87.29 METABOLIC ACIDOSIS, INCREASED ANION GAP (IAG): Status: ACTIVE | Noted: 2020-01-01

## 2020-04-29 PROBLEM — R74.01 TRANSAMINITIS: Status: ACTIVE | Noted: 2020-01-01

## 2020-04-29 PROBLEM — J96.01 ACUTE RESPIRATORY FAILURE WITH HYPOXIA (HCC): Status: ACTIVE | Noted: 2020-01-01

## 2020-04-30 PROBLEM — Z20.822 COVID-19 VIRUS NOT DETECTED: Status: ACTIVE | Noted: 2020-01-01

## 2020-04-30 PROBLEM — Z20.822 SUSPECTED COVID-19 VIRUS INFECTION: Status: RESOLVED | Noted: 2020-01-01 | Resolved: 2020-01-01

## 2020-05-02 PROBLEM — Z20.822 COVID-19 VIRUS NOT DETECTED: Status: ACTIVE | Noted: 2020-01-01

## 2020-05-03 PROBLEM — J18.9 PNEUMONIA OF LEFT LOWER LOBE DUE TO INFECTIOUS ORGANISM: Status: ACTIVE | Noted: 2020-01-01

## 2020-05-07 LAB
BACTERIA FLD CULT: NORMAL
BACTERIA SPEC AEROBE CULT: NORMAL
BACTERIA SPEC AEROBE CULT: NORMAL
BH BB BLOOD EXPIRATION DATE: NORMAL
BH BB BLOOD TYPE BARCODE: NORMAL
BH BB DISPENSE STATUS: NORMAL
BH BB PRODUCT CODE: NORMAL
BH BB UNIT NUMBER: NORMAL
CROSSMATCH INTERPRETATION: NORMAL
GRAM STN SPEC: NORMAL
GRAM STN SPEC: NORMAL
UNIT  ABO: NORMAL
UNIT  RH: NORMAL

## 2020-05-08 PROCEDURE — 93010 ELECTROCARDIOGRAM REPORT: CPT | Performed by: INTERNAL MEDICINE

## 2020-05-09 PROCEDURE — 93010 ELECTROCARDIOGRAM REPORT: CPT | Performed by: INTERNAL MEDICINE

## 2020-05-12 ENCOUNTER — APPOINTMENT (OUTPATIENT)
Dept: ONCOLOGY | Facility: HOSPITAL | Age: 75
End: 2020-05-12

## 2020-05-27 LAB — FUNGUS WND CULT: ABNORMAL

## 2020-06-12 LAB
MYCOBACTERIUM SPEC CULT: NORMAL
NIGHT BLUE STAIN TISS: NORMAL

## 2022-03-30 NOTE — OUTREACH NOTE
Problem: Plan of Care - These are the overarching goals to be used throughout the patient stay.    Goal: Plan of Care Review/Shift Note  Description: The Plan of Care Review/Shift note should be completed every shift.  The Outcome Evaluation is a brief statement about your assessment that the patient is improving, declining, or no change.  This information will be displayed automatically on your shift note.  Outcome: Ongoing, Progressing  Flowsheets (Taken 3/30/2022 1318)  Plan of Care Reviewed With:   patient   spouse  Outcome Evaluation: Patient up in chair as of this morning after chlorhexidine bed bath and linen change. Ambulates to bathroom. IV abx hanging and currently infusing. Patient on room air. PO Tylenol given for chest pain and upper back pain related to deep breathing with current dx of pneumonia and hx of PE.  Overall Patient Progress: improving   Goal Outcome Evaluation:    Plan of Care Reviewed With: patient, spouse     Overall Patient Progress: improving    Outcome Evaluation: Patient up in chair as of this morning after chlorhexidine bed bath and linen change. Ambulates to bathroom. IV abx hanging and currently infusing. Patient on room air. PO Tylenol given for chest pain and upper back pain related to deep breathing with current dx of pneumonia and hx of PE.       General Surgery Week 1 Survey      Responses   Facility patient discharged from?  [facility]   Does the patient have one of the following disease processes/diagnoses(primary or secondary)?  General Surgery   Is there a successful TCM telephone encounter documented?  No   Week 1 attempt successful?  Yes   Call start time  [phone]   Call end time  1129   Medication alerts for this patient  PATIENT STATES TYLENOL IS NOT HELPING HIS PAIN. PATIENT ADVISED TO CONTACT DR. ELI OFFICE TO DISCUSS ANOTHER TYPE OF PAIN RELIEF.    Meds reviewed with patient/caregiver?  Yes   Is the patient having any side effects they believe may be caused by any medication additions or changes?  No   Does the patient have all medications related to this admission filled (includes all antibiotics, pain medications, etc.)  N/A   Is the patient taking all medications as directed (includes completed medication regime)?  Yes   Does the patient have a follow up appointment scheduled with their surgeon?  Yes   Has the patient kept scheduled appointments due by today?  N/A   What is the Home health agency?   [facility]   Has home health visited the patient within 72 hours of discharge?  Yes   Did the patient receive a copy of their discharge instructions?  Yes   Nursing interventions  Reviewed instructions with patient   What is the patient's perception of their health status since discharge?  Improving   Nursing interventions  Nurse provided patient education   Is the patient /caregiver able to teach back basic post-op care?  Practice 'cough and deep breath', Drive as instructed by MD in discharge instructions, Take showers only when approved by MD-sponge bathe until then, No tub bath, swimming, or hot tub until instructed by MD, Keep incision areas clean,dry and protected, Do not remove steri-strips, Lifting as instructed by MD in discharge instructions   Is the patient/caregiver able to teach back signs and symptoms of incisional infection?  Increased  redness, swelling or pain at the incisonal site, Increased drainage or bleeding, Incisional warmth, Pus or odor from incision, Fever   Is the patient/caregiver able to teach back steps to recovery at home?  Set small, achievable goals for return to baseline health, Rest and rebuild strength, gradually increase activity, Make a list of questions for surgeon's appointment   Is the patient/caregiver able to teach back the hierarchy of who to call/visit for symptoms/problems? PCP, Specialist, Home health nurse, Urgent Care, ED, 911  Yes   Week 1 call completed?  Yes          Delores Pettit LPN

## 2023-02-26 NOTE — PROGRESS NOTES
PATIENTS ONCOLOGY RECORD LOCATED IN [facility]      Subjective     Name:  LEATHA RAMÍREZ     Date:  2018  Address:  [address] [place] [address]  Home: [unfilled]  :  1945 AGE:  73 y.o.        RECORDS OBTAINED:  Patients Oncology Record is located in [facility]  
You can access the FollowMyHealth Patient Portal offered by [facility] by registering at the following website: [email or web address]. By joining [employer]’s FollowMyHealth portal, you will also be able to view your health information using other applications (apps) compatible with our system.

## 2024-10-18 NOTE — PLAN OF CARE
Problem: Patient Care Overview  Goal: Individualization and Mutuality  Outcome: Ongoing (interventions implemented as appropriate)  Note:   No events overnight. Patient remains stable, states sensation in left side is improving to baseline. C/o constipation at beginning of shift, stool softner and laxative ordered PRN. Passing gas, reports decrease in fullness.       1 pair

## (undated) DEVICE — DRAPE SHEET ULTRAGARD: Brand: MEDLINE

## (undated) DEVICE — SUT VICYL 2/0 CR8 18IN DYED J726D

## (undated) DEVICE — 3M™ IOBAN™ 2 ANTIMICROBIAL INCISE DRAPE 6650EZ: Brand: IOBAN™ 2

## (undated) DEVICE — NDL HYPO PRECISIONGLIDE REG 25G 1 1/2

## (undated) DEVICE — SPNG GZ WOVN 4X4IN 12PLY 10/BX STRL

## (undated) DEVICE — CABL BIPOL W/ALLGTR CLIP/SM 12FT

## (undated) DEVICE — TRAJ GUIDE KIT, 9733065, BIOPSY, EXT

## (undated) DEVICE — SPONGE,LAP,12"X12",XR,ST,5/PK,40PK/CS: Brand: MEDLINE

## (undated) DEVICE — VIOLET BRAIDED (POLYGLACTIN 910), SYNTHETIC ABSORBABLE SUTURE: Brand: COATED VICRYL

## (undated) DEVICE — DRSNG TELFA PAD NONADH STR 1S 3X8IN

## (undated) DEVICE — CARBIDE BUR,ROUND CUTTING, 8 FLUTES, MED., 4MM DIA.: Brand: MICROAIRE®

## (undated) DEVICE — GLV SURG TRIUMPH LT PF LTX 6.5 STRL

## (undated) DEVICE — SUT MONOCRYL 4/0 PS2 27IN Y426H ETY426H

## (undated) DEVICE — TUBING, SUCTION, 1/4" X 12', STRAIGHT: Brand: MEDLINE

## (undated) DEVICE — SPHR MARKR STEALTH STATION

## (undated) DEVICE — 2.3MM TAPERED ROUTER

## (undated) DEVICE — GLV SURG TRIUMPH GREEN W/ALOE PF LTX 7 STRL

## (undated) DEVICE — PREP SOL DYNA-HEX CHG LIQ 4% BT 4OZ

## (undated) DEVICE — ST EXT MAXPLUS PRESS RATED 7IN

## (undated) DEVICE — UNDYED BRAIDED (POLYGLACTIN 910), SYNTHETIC ABSORBABLE SUTURE: Brand: COATED VICRYL

## (undated) DEVICE — PK CRANIOTOMY 50

## (undated) DEVICE — MARKR SKIN W/RULR

## (undated) DEVICE — FRCP SPEC BIP BAYO NONSTK W/CORD 8IN 1MM DISP

## (undated) DEVICE — SYR LL TP 10ML STRL

## (undated) DEVICE — SUT VIC 0 CT2 CR8 18IN DYED J727D

## (undated) DEVICE — SUT MNCRYL 3/0 Y936H

## (undated) DEVICE — DRSNG WND BORDR/ADHS NONADHR/GZ LF 4X4IN STRL

## (undated) DEVICE — INTRO SHEATH PRELUDE SNAP .038 6F 13CM W/SDPRT

## (undated) DEVICE — PK PROC TURNOVER

## (undated) DEVICE — BIOPSY NEEDLE KIT 9733068 PASSIVE

## (undated) DEVICE — CATH IV INSYTE AUTOGARD 14G 1 1/2IN ORNG

## (undated) DEVICE — 3M™ PATIENT PLATE, CORDED, SPLIT, LARGE, 40 PER CASE, 1179: Brand: 3M™

## (undated) DEVICE — BIT DRILL TWIST 2.30MM

## (undated) DEVICE — GLV SURG TRIUMPH GREEN W/ALOE PF LTX 8.5 STRL

## (undated) DEVICE — ELECTRD DEFIB M/FUNC PROPADZ RADIOL 2PK

## (undated) DEVICE — SUT SILK 2/0 FS BLK 18IN 685G

## (undated) DEVICE — PACEMAKER CDS: Brand: MEDLINE INDUSTRIES, INC.

## (undated) DEVICE — 3M™ STERI-STRIP™ REINFORCED ADHESIVE SKIN CLOSURES, R1547, 1/2 IN X 4 IN (12 MM X 100 MM), 6 STRIPS/ENVELOPE: Brand: 3M™ STERI-STRIP™

## (undated) DEVICE — GLV SURG TRIUMPH LT PF LTX 8 STRL